# Patient Record
Sex: MALE | Race: WHITE | NOT HISPANIC OR LATINO | Employment: OTHER | URBAN - NONMETROPOLITAN AREA
[De-identification: names, ages, dates, MRNs, and addresses within clinical notes are randomized per-mention and may not be internally consistent; named-entity substitution may affect disease eponyms.]

---

## 2017-08-15 ENCOUNTER — ANESTHESIA (OUTPATIENT)
Dept: PERIOP | Facility: HOSPITAL | Age: 47
End: 2017-08-15

## 2017-08-15 ENCOUNTER — ANESTHESIA EVENT (OUTPATIENT)
Dept: PERIOP | Facility: HOSPITAL | Age: 47
End: 2017-08-15

## 2017-08-15 ENCOUNTER — HOSPITAL ENCOUNTER (INPATIENT)
Facility: HOSPITAL | Age: 47
LOS: 1 days | Discharge: LEFT BEFORE DISCHARGE | End: 2017-08-17
Attending: SURGERY | Admitting: SURGERY

## 2017-08-15 DIAGNOSIS — L03.119 CELLULITIS AND ABSCESS OF LOWER EXTREMITY: Primary | ICD-10-CM

## 2017-08-15 DIAGNOSIS — L02.419 CELLULITIS AND ABSCESS OF LOWER EXTREMITY: Primary | ICD-10-CM

## 2017-08-15 LAB
ANION GAP SERPL CALCULATED.3IONS-SCNC: 4.1 MMOL/L (ref 3.6–11.2)
BUN BLD-MCNC: 13 MG/DL (ref 7–21)
BUN/CREAT SERPL: 21.3 (ref 7–25)
CALCIUM SPEC-SCNC: 8.8 MG/DL (ref 7.7–10)
CHLORIDE SERPL-SCNC: 108 MMOL/L (ref 99–112)
CO2 SERPL-SCNC: 25.9 MMOL/L (ref 24.3–31.9)
CREAT BLD-MCNC: 0.61 MG/DL (ref 0.43–1.29)
GFR SERPL CREATININE-BSD FRML MDRD: 142 ML/MIN/1.73
GLUCOSE BLD-MCNC: 110 MG/DL (ref 70–110)
INR PPP: 1.63 (ref 0.9–1.1)
OSMOLALITY SERPL CALC.SUM OF ELEC: 276.4 MOSM/KG (ref 273–305)
POTASSIUM BLD-SCNC: 4.4 MMOL/L (ref 3.5–5.3)
PROTHROMBIN TIME: 19.6 SECONDS (ref 11–15.4)
SODIUM BLD-SCNC: 138 MMOL/L (ref 135–153)

## 2017-08-15 PROCEDURE — 85610 PROTHROMBIN TIME: CPT | Performed by: SURGERY

## 2017-08-15 PROCEDURE — 88304 TISSUE EXAM BY PATHOLOGIST: CPT | Performed by: SURGERY

## 2017-08-15 PROCEDURE — 87186 SC STD MICRODIL/AGAR DIL: CPT | Performed by: SURGERY

## 2017-08-15 PROCEDURE — 25010000002 FENTANYL CITRATE (PF) 100 MCG/2ML SOLUTION: Performed by: NURSE ANESTHETIST, CERTIFIED REGISTERED

## 2017-08-15 PROCEDURE — 25010000002 VITAMIN K1 PER 1 MG: Performed by: ANESTHESIOLOGY

## 2017-08-15 PROCEDURE — 25010000002 ONDANSETRON PER 1 MG: Performed by: ANESTHESIOLOGY

## 2017-08-15 PROCEDURE — 87205 SMEAR GRAM STAIN: CPT | Performed by: SURGERY

## 2017-08-15 PROCEDURE — 87077 CULTURE AEROBIC IDENTIFY: CPT | Performed by: SURGERY

## 2017-08-15 PROCEDURE — 87147 CULTURE TYPE IMMUNOLOGIC: CPT | Performed by: SURGERY

## 2017-08-15 PROCEDURE — 99223 1ST HOSP IP/OBS HIGH 75: CPT | Performed by: SURGERY

## 2017-08-15 PROCEDURE — G0378 HOSPITAL OBSERVATION PER HR: HCPCS

## 2017-08-15 PROCEDURE — 0JBD0ZZ EXCISION OF RIGHT UPPER ARM SUBCUTANEOUS TISSUE AND FASCIA, OPEN APPROACH: ICD-10-PCS | Performed by: SURGERY

## 2017-08-15 PROCEDURE — 25010000002 PROPOFOL 10 MG/ML EMULSION: Performed by: ANESTHESIOLOGY

## 2017-08-15 PROCEDURE — 25010000002 VANCOMYCIN PER 500 MG

## 2017-08-15 PROCEDURE — 87070 CULTURE OTHR SPECIMN AEROBIC: CPT | Performed by: SURGERY

## 2017-08-15 PROCEDURE — 94799 UNLISTED PULMONARY SVC/PX: CPT

## 2017-08-15 PROCEDURE — 80048 BASIC METABOLIC PNL TOTAL CA: CPT

## 2017-08-15 PROCEDURE — 25010000002 FENTANYL CITRATE (PF) 100 MCG/2ML SOLUTION: Performed by: ANESTHESIOLOGY

## 2017-08-15 PROCEDURE — 25010000002 MORPHINE PER 10 MG: Performed by: SURGERY

## 2017-08-15 PROCEDURE — 11406 EXC TR-EXT B9+MARG >4.0 CM: CPT | Performed by: SURGERY

## 2017-08-15 PROCEDURE — 25010000002 PIPERACILLIN-TAZOBACTAM: Performed by: SURGERY

## 2017-08-15 RX ORDER — METOPROLOL SUCCINATE 100 MG/1
100 TABLET, EXTENDED RELEASE ORAL 2 TIMES DAILY
COMMUNITY
End: 2017-08-17

## 2017-08-15 RX ORDER — IPRATROPIUM BROMIDE AND ALBUTEROL SULFATE 2.5; .5 MG/3ML; MG/3ML
3 SOLUTION RESPIRATORY (INHALATION) ONCE AS NEEDED
Status: DISCONTINUED | OUTPATIENT
Start: 2017-08-15 | End: 2017-08-15 | Stop reason: HOSPADM

## 2017-08-15 RX ORDER — SODIUM CHLORIDE 0.9 % (FLUSH) 0.9 %
1-10 SYRINGE (ML) INJECTION AS NEEDED
Status: DISCONTINUED | OUTPATIENT
Start: 2017-08-15 | End: 2017-08-17 | Stop reason: HOSPADM

## 2017-08-15 RX ORDER — ARIPIPRAZOLE 10 MG/1
5 TABLET ORAL DAILY
Status: DISCONTINUED | OUTPATIENT
Start: 2017-08-15 | End: 2017-08-17 | Stop reason: HOSPADM

## 2017-08-15 RX ORDER — MELOXICAM 7.5 MG/1
7.5 TABLET ORAL DAILY
Status: CANCELLED | OUTPATIENT
Start: 2017-08-15

## 2017-08-15 RX ORDER — DULOXETIN HYDROCHLORIDE 60 MG/1
60 CAPSULE, DELAYED RELEASE ORAL DAILY
Status: CANCELLED | OUTPATIENT
Start: 2017-08-15

## 2017-08-15 RX ORDER — LISINOPRIL 10 MG/1
20 TABLET ORAL DAILY
Status: DISCONTINUED | OUTPATIENT
Start: 2017-08-15 | End: 2017-08-16

## 2017-08-15 RX ORDER — LEVETIRACETAM 500 MG/1
1500 TABLET ORAL EVERY 12 HOURS SCHEDULED
Status: DISCONTINUED | OUTPATIENT
Start: 2017-08-15 | End: 2017-08-17 | Stop reason: HOSPADM

## 2017-08-15 RX ORDER — ALLOPURINOL 300 MG/1
300 TABLET ORAL DAILY
Status: CANCELLED | OUTPATIENT
Start: 2017-08-15

## 2017-08-15 RX ORDER — SODIUM CHLORIDE, SODIUM LACTATE, POTASSIUM CHLORIDE, CALCIUM CHLORIDE 600; 310; 30; 20 MG/100ML; MG/100ML; MG/100ML; MG/100ML
100 INJECTION, SOLUTION INTRAVENOUS CONTINUOUS
Status: DISCONTINUED | OUTPATIENT
Start: 2017-08-15 | End: 2017-08-17 | Stop reason: HOSPADM

## 2017-08-15 RX ORDER — FUROSEMIDE 80 MG
80 TABLET ORAL DAILY
Status: CANCELLED | OUTPATIENT
Start: 2017-08-15

## 2017-08-15 RX ORDER — PANTOPRAZOLE SODIUM 40 MG/1
40 TABLET, DELAYED RELEASE ORAL EVERY MORNING
Status: DISCONTINUED | OUTPATIENT
Start: 2017-08-16 | End: 2017-08-17 | Stop reason: HOSPADM

## 2017-08-15 RX ORDER — POTASSIUM CHLORIDE 750 MG/1
20 TABLET, FILM COATED, EXTENDED RELEASE ORAL
Status: DISCONTINUED | OUTPATIENT
Start: 2017-08-16 | End: 2017-08-17 | Stop reason: HOSPADM

## 2017-08-15 RX ORDER — PANTOPRAZOLE SODIUM 40 MG/1
40 TABLET, DELAYED RELEASE ORAL EVERY MORNING
Status: CANCELLED | OUTPATIENT
Start: 2017-08-16

## 2017-08-15 RX ORDER — ALLOPURINOL 300 MG/1
300 TABLET ORAL DAILY
Status: DISCONTINUED | OUTPATIENT
Start: 2017-08-15 | End: 2017-08-17 | Stop reason: HOSPADM

## 2017-08-15 RX ORDER — IPRATROPIUM BROMIDE AND ALBUTEROL SULFATE 2.5; .5 MG/3ML; MG/3ML
3 SOLUTION RESPIRATORY (INHALATION)
Status: CANCELLED | OUTPATIENT
Start: 2017-08-15

## 2017-08-15 RX ORDER — MELOXICAM 15 MG/1
15 TABLET ORAL DAILY
COMMUNITY
End: 2021-06-21 | Stop reason: HOSPADM

## 2017-08-15 RX ORDER — FENTANYL CITRATE 50 UG/ML
50 INJECTION, SOLUTION INTRAMUSCULAR; INTRAVENOUS
Status: DISCONTINUED | OUTPATIENT
Start: 2017-08-15 | End: 2017-08-15 | Stop reason: HOSPADM

## 2017-08-15 RX ORDER — LISINOPRIL 20 MG/1
20 TABLET ORAL DAILY
Status: ON HOLD | COMMUNITY
End: 2017-08-17

## 2017-08-15 RX ORDER — MELOXICAM 7.5 MG/1
7.5 TABLET ORAL DAILY
Status: DISCONTINUED | OUTPATIENT
Start: 2017-08-15 | End: 2017-08-17 | Stop reason: HOSPADM

## 2017-08-15 RX ORDER — GABAPENTIN 400 MG/1
600 CAPSULE ORAL 3 TIMES DAILY
COMMUNITY

## 2017-08-15 RX ORDER — ALBUTEROL SULFATE 2.5 MG/3ML
2.5 SOLUTION RESPIRATORY (INHALATION) EVERY 6 HOURS PRN
Status: CANCELLED | OUTPATIENT
Start: 2017-08-15

## 2017-08-15 RX ORDER — COLCHICINE 0.6 MG/1
0.6 TABLET ORAL 3 TIMES DAILY
Status: CANCELLED | OUTPATIENT
Start: 2017-08-15

## 2017-08-15 RX ORDER — BUDESONIDE AND FORMOTEROL FUMARATE DIHYDRATE 160; 4.5 UG/1; UG/1
2 AEROSOL RESPIRATORY (INHALATION)
Status: ON HOLD | COMMUNITY
End: 2021-06-15

## 2017-08-15 RX ORDER — OXYCODONE HYDROCHLORIDE AND ACETAMINOPHEN 5; 325 MG/1; MG/1
1 TABLET ORAL ONCE AS NEEDED
Status: DISCONTINUED | OUTPATIENT
Start: 2017-08-15 | End: 2017-08-15 | Stop reason: HOSPADM

## 2017-08-15 RX ORDER — FUROSEMIDE 80 MG
80 TABLET ORAL DAILY
Status: DISCONTINUED | OUTPATIENT
Start: 2017-08-15 | End: 2017-08-17 | Stop reason: HOSPADM

## 2017-08-15 RX ORDER — ROSUVASTATIN CALCIUM 20 MG/1
20 TABLET, COATED ORAL DAILY
Status: CANCELLED | OUTPATIENT
Start: 2017-08-15

## 2017-08-15 RX ORDER — GABAPENTIN 400 MG/1
800 CAPSULE ORAL EVERY 8 HOURS SCHEDULED
Status: DISCONTINUED | OUTPATIENT
Start: 2017-08-15 | End: 2017-08-17 | Stop reason: HOSPADM

## 2017-08-15 RX ORDER — SODIUM CHLORIDE 0.9 % (FLUSH) 0.9 %
1-10 SYRINGE (ML) INJECTION AS NEEDED
Status: DISCONTINUED | OUTPATIENT
Start: 2017-08-15 | End: 2017-08-15 | Stop reason: HOSPADM

## 2017-08-15 RX ORDER — WARFARIN SODIUM 3 MG/1
3 TABLET ORAL
Status: CANCELLED | OUTPATIENT
Start: 2017-08-15

## 2017-08-15 RX ORDER — DULOXETIN HYDROCHLORIDE 60 MG/1
60 CAPSULE, DELAYED RELEASE ORAL DAILY
Status: DISCONTINUED | OUTPATIENT
Start: 2017-08-15 | End: 2017-08-17 | Stop reason: HOSPADM

## 2017-08-15 RX ORDER — ONDANSETRON 2 MG/ML
4 INJECTION INTRAMUSCULAR; INTRAVENOUS ONCE AS NEEDED
Status: DISCONTINUED | OUTPATIENT
Start: 2017-08-15 | End: 2017-08-15 | Stop reason: HOSPADM

## 2017-08-15 RX ORDER — MAGNESIUM HYDROXIDE 1200 MG/15ML
LIQUID ORAL AS NEEDED
Status: DISCONTINUED | OUTPATIENT
Start: 2017-08-15 | End: 2017-08-15 | Stop reason: HOSPADM

## 2017-08-15 RX ORDER — LABETALOL HYDROCHLORIDE 5 MG/ML
5 INJECTION, SOLUTION INTRAVENOUS ONCE
Status: COMPLETED | OUTPATIENT
Start: 2017-08-15 | End: 2017-08-15

## 2017-08-15 RX ORDER — ALBUTEROL SULFATE 2.5 MG/3ML
2.5 SOLUTION RESPIRATORY (INHALATION) EVERY 6 HOURS PRN
Status: ON HOLD | COMMUNITY
End: 2021-06-15

## 2017-08-15 RX ORDER — POTASSIUM CHLORIDE 750 MG/1
20 TABLET, FILM COATED, EXTENDED RELEASE ORAL
Status: CANCELLED | OUTPATIENT
Start: 2017-08-16

## 2017-08-15 RX ORDER — ROSUVASTATIN CALCIUM 10 MG/1
10 TABLET, COATED ORAL NIGHTLY
COMMUNITY

## 2017-08-15 RX ORDER — CLONIDINE HYDROCHLORIDE 0.2 MG/1
0.2 TABLET ORAL 2 TIMES DAILY
Status: DISCONTINUED | OUTPATIENT
Start: 2017-08-15 | End: 2017-08-17 | Stop reason: HOSPADM

## 2017-08-15 RX ORDER — MEPERIDINE HYDROCHLORIDE 25 MG/ML
12.5 INJECTION INTRAMUSCULAR; INTRAVENOUS; SUBCUTANEOUS
Status: DISCONTINUED | OUTPATIENT
Start: 2017-08-15 | End: 2017-08-15 | Stop reason: HOSPADM

## 2017-08-15 RX ORDER — PROPOFOL 10 MG/ML
VIAL (ML) INTRAVENOUS AS NEEDED
Status: DISCONTINUED | OUTPATIENT
Start: 2017-08-15 | End: 2017-08-15 | Stop reason: SURG

## 2017-08-15 RX ORDER — METOPROLOL TARTRATE 5 MG/5ML
INJECTION INTRAVENOUS AS NEEDED
Status: DISCONTINUED | OUTPATIENT
Start: 2017-08-15 | End: 2017-08-15 | Stop reason: SURG

## 2017-08-15 RX ORDER — METOPROLOL SUCCINATE 50 MG/1
100 TABLET, EXTENDED RELEASE ORAL 2 TIMES DAILY
Status: CANCELLED | OUTPATIENT
Start: 2017-08-15

## 2017-08-15 RX ORDER — ALBUTEROL SULFATE 90 UG/1
2 AEROSOL, METERED RESPIRATORY (INHALATION) EVERY 4 HOURS PRN
COMMUNITY

## 2017-08-15 RX ORDER — LEVETIRACETAM 500 MG/1
1500 TABLET ORAL 2 TIMES DAILY
Status: CANCELLED | OUTPATIENT
Start: 2017-08-15

## 2017-08-15 RX ORDER — WARFARIN SODIUM 3 MG/1
3 TABLET ORAL
COMMUNITY
End: 2017-08-17 | Stop reason: HOSPADM

## 2017-08-15 RX ORDER — DOCUSATE SODIUM 100 MG/1
100 CAPSULE, LIQUID FILLED ORAL 2 TIMES DAILY
Status: DISCONTINUED | OUTPATIENT
Start: 2017-08-15 | End: 2017-08-17 | Stop reason: HOSPADM

## 2017-08-15 RX ORDER — FENTANYL CITRATE 50 UG/ML
INJECTION, SOLUTION INTRAMUSCULAR; INTRAVENOUS AS NEEDED
Status: DISCONTINUED | OUTPATIENT
Start: 2017-08-15 | End: 2017-08-15 | Stop reason: SURG

## 2017-08-15 RX ORDER — CLINDAMYCIN PHOSPHATE 900 MG/50ML
900 INJECTION INTRAVENOUS ONCE
Status: COMPLETED | OUTPATIENT
Start: 2017-08-15 | End: 2017-08-15

## 2017-08-15 RX ORDER — ALBUTEROL SULFATE 2.5 MG/3ML
2.5 SOLUTION RESPIRATORY (INHALATION) EVERY 6 HOURS PRN
Status: DISCONTINUED | OUTPATIENT
Start: 2017-08-15 | End: 2017-08-17 | Stop reason: HOSPADM

## 2017-08-15 RX ORDER — CLONIDINE HYDROCHLORIDE 0.2 MG/1
0.2 TABLET ORAL 2 TIMES DAILY
COMMUNITY
End: 2021-06-21 | Stop reason: HOSPADM

## 2017-08-15 RX ORDER — LISINOPRIL 10 MG/1
20 TABLET ORAL DAILY
Status: CANCELLED | OUTPATIENT
Start: 2017-08-15

## 2017-08-15 RX ORDER — ARIPIPRAZOLE 10 MG/1
5 TABLET ORAL DAILY
Status: CANCELLED | OUTPATIENT
Start: 2017-08-15

## 2017-08-15 RX ORDER — COLCHICINE 0.6 MG/1
0.6 TABLET ORAL 3 TIMES DAILY PRN
Status: DISCONTINUED | OUTPATIENT
Start: 2017-08-15 | End: 2017-08-17 | Stop reason: HOSPADM

## 2017-08-15 RX ORDER — TRAZODONE HYDROCHLORIDE 100 MG/1
100 TABLET ORAL NIGHTLY
COMMUNITY
End: 2022-07-27

## 2017-08-15 RX ORDER — ROSUVASTATIN CALCIUM 20 MG/1
20 TABLET, COATED ORAL DAILY
Status: DISCONTINUED | OUTPATIENT
Start: 2017-08-15 | End: 2017-08-17 | Stop reason: HOSPADM

## 2017-08-15 RX ORDER — LEVETIRACETAM 750 MG/1
750 TABLET ORAL 2 TIMES DAILY
COMMUNITY

## 2017-08-15 RX ORDER — WARFARIN SODIUM 4 MG/1
4 TABLET ORAL
COMMUNITY
End: 2017-08-17 | Stop reason: HOSPADM

## 2017-08-15 RX ORDER — CLONIDINE HYDROCHLORIDE 0.2 MG/1
0.2 TABLET ORAL 2 TIMES DAILY
Status: CANCELLED | OUTPATIENT
Start: 2017-08-15

## 2017-08-15 RX ORDER — TRAZODONE HYDROCHLORIDE 50 MG/1
100 TABLET ORAL NIGHTLY
Status: CANCELLED | OUTPATIENT
Start: 2017-08-15

## 2017-08-15 RX ORDER — GABAPENTIN 400 MG/1
800 CAPSULE ORAL EVERY 8 HOURS SCHEDULED
Status: CANCELLED | OUTPATIENT
Start: 2017-08-15

## 2017-08-15 RX ORDER — SODIUM CHLORIDE, SODIUM LACTATE, POTASSIUM CHLORIDE, CALCIUM CHLORIDE 600; 310; 30; 20 MG/100ML; MG/100ML; MG/100ML; MG/100ML
125 INJECTION, SOLUTION INTRAVENOUS CONTINUOUS
Status: DISCONTINUED | OUTPATIENT
Start: 2017-08-15 | End: 2017-08-15 | Stop reason: HOSPADM

## 2017-08-15 RX ORDER — BUDESONIDE AND FORMOTEROL FUMARATE DIHYDRATE 160; 4.5 UG/1; UG/1
2 AEROSOL RESPIRATORY (INHALATION)
Status: DISCONTINUED | OUTPATIENT
Start: 2017-08-15 | End: 2017-08-17 | Stop reason: HOSPADM

## 2017-08-15 RX ORDER — OMEPRAZOLE 20 MG/1
20 CAPSULE, DELAYED RELEASE ORAL DAILY
COMMUNITY

## 2017-08-15 RX ORDER — DULOXETIN HYDROCHLORIDE 60 MG/1
60 CAPSULE, DELAYED RELEASE ORAL 2 TIMES DAILY
Status: ON HOLD | COMMUNITY
End: 2022-07-25

## 2017-08-15 RX ORDER — LANOLIN ALCOHOL/MO/W.PET/CERES
500 CREAM (GRAM) TOPICAL DAILY
COMMUNITY

## 2017-08-15 RX ORDER — FUROSEMIDE 40 MG/1
40 TABLET ORAL DAILY
COMMUNITY
End: 2021-06-21 | Stop reason: HOSPADM

## 2017-08-15 RX ORDER — ARIPIPRAZOLE 10 MG/1
10 TABLET ORAL DAILY
COMMUNITY

## 2017-08-15 RX ORDER — WARFARIN SODIUM 4 MG/1
4 TABLET ORAL
Status: CANCELLED | OUTPATIENT
Start: 2017-08-15

## 2017-08-15 RX ORDER — POTASSIUM CHLORIDE 750 MG/1
10 TABLET, FILM COATED, EXTENDED RELEASE ORAL DAILY
COMMUNITY
End: 2021-06-21 | Stop reason: HOSPADM

## 2017-08-15 RX ORDER — COLCHICINE 0.6 MG/1
0.6 TABLET ORAL 3 TIMES DAILY
COMMUNITY
End: 2017-08-17

## 2017-08-15 RX ORDER — TRAZODONE HYDROCHLORIDE 50 MG/1
100 TABLET ORAL NIGHTLY
Status: DISCONTINUED | OUTPATIENT
Start: 2017-08-15 | End: 2017-08-17 | Stop reason: HOSPADM

## 2017-08-15 RX ORDER — HEPARIN SODIUM 5000 [USP'U]/ML
5000 INJECTION, SOLUTION INTRAVENOUS; SUBCUTANEOUS EVERY 8 HOURS SCHEDULED
Status: DISCONTINUED | OUTPATIENT
Start: 2017-08-16 | End: 2017-08-16

## 2017-08-15 RX ORDER — POLYETHYLENE GLYCOL 3350 17 G/17G
17 POWDER, FOR SOLUTION ORAL DAILY
Status: DISCONTINUED | OUTPATIENT
Start: 2017-08-15 | End: 2017-08-17 | Stop reason: HOSPADM

## 2017-08-15 RX ORDER — OXYCODONE HYDROCHLORIDE 15 MG/1
15 TABLET ORAL 4 TIMES DAILY PRN
COMMUNITY
End: 2022-05-19 | Stop reason: ALTCHOICE

## 2017-08-15 RX ORDER — METOPROLOL SUCCINATE 50 MG/1
100 TABLET, EXTENDED RELEASE ORAL EVERY 12 HOURS SCHEDULED
Status: DISCONTINUED | OUTPATIENT
Start: 2017-08-15 | End: 2017-08-16

## 2017-08-15 RX ORDER — MIDAZOLAM HYDROCHLORIDE 1 MG/ML
INJECTION INTRAMUSCULAR; INTRAVENOUS AS NEEDED
Status: DISCONTINUED | OUTPATIENT
Start: 2017-08-15 | End: 2017-08-15

## 2017-08-15 RX ORDER — IPRATROPIUM BROMIDE AND ALBUTEROL SULFATE 2.5; .5 MG/3ML; MG/3ML
3 SOLUTION RESPIRATORY (INHALATION)
Status: DISCONTINUED | OUTPATIENT
Start: 2017-08-15 | End: 2017-08-17 | Stop reason: HOSPADM

## 2017-08-15 RX ORDER — ALLOPURINOL 300 MG/1
300 TABLET ORAL DAILY
COMMUNITY

## 2017-08-15 RX ORDER — BUDESONIDE AND FORMOTEROL FUMARATE DIHYDRATE 160; 4.5 UG/1; UG/1
2 AEROSOL RESPIRATORY (INHALATION)
Status: CANCELLED | OUTPATIENT
Start: 2017-08-15

## 2017-08-15 RX ADMIN — FENTANYL CITRATE 50 MCG: 50 INJECTION INTRAMUSCULAR; INTRAVENOUS at 18:12

## 2017-08-15 RX ADMIN — SODIUM CHLORIDE, POTASSIUM CHLORIDE, SODIUM LACTATE AND CALCIUM CHLORIDE 125 ML/HR: 600; 310; 30; 20 INJECTION, SOLUTION INTRAVENOUS at 15:26

## 2017-08-15 RX ADMIN — METOPROLOL TARTRATE 3 MG: 1 INJECTION, SOLUTION INTRAVENOUS at 15:55

## 2017-08-15 RX ADMIN — CLONIDINE HYDROCHLORIDE 0.2 MG: 0.2 TABLET ORAL at 21:11

## 2017-08-15 RX ADMIN — FENTANYL CITRATE 50 MCG: 50 INJECTION INTRAMUSCULAR; INTRAVENOUS at 17:44

## 2017-08-15 RX ADMIN — METOPROLOL SUCCINATE 100 MG: 50 TABLET, FILM COATED, EXTENDED RELEASE ORAL at 21:12

## 2017-08-15 RX ADMIN — PIPERACILLIN SODIUM,TAZOBACTAM SODIUM 3.38 G: 3; .375 INJECTION, POWDER, FOR SOLUTION INTRAVENOUS at 21:14

## 2017-08-15 RX ADMIN — DULOXETINE HYDROCHLORIDE 60 MG: 60 CAPSULE, DELAYED RELEASE ORAL at 21:12

## 2017-08-15 RX ADMIN — MORPHINE SULFATE 4 MG: 4 INJECTION, SOLUTION INTRAMUSCULAR; INTRAVENOUS at 19:52

## 2017-08-15 RX ADMIN — VANCOMYCIN HYDROCHLORIDE 2500 MG: 5 INJECTION, POWDER, LYOPHILIZED, FOR SOLUTION INTRAVENOUS at 22:00

## 2017-08-15 RX ADMIN — DOCUSATE SODIUM 100 MG: 100 CAPSULE, LIQUID FILLED ORAL at 21:12

## 2017-08-15 RX ADMIN — FUROSEMIDE 80 MG: 80 TABLET ORAL at 21:11

## 2017-08-15 RX ADMIN — LISINOPRIL 20 MG: 10 TABLET ORAL at 21:12

## 2017-08-15 RX ADMIN — FENTANYL CITRATE 50 MCG: 50 INJECTION INTRAMUSCULAR; INTRAVENOUS at 17:51

## 2017-08-15 RX ADMIN — METOPROLOL TARTRATE 2 MG: 1 INJECTION, SOLUTION INTRAVENOUS at 16:30

## 2017-08-15 RX ADMIN — LEVETIRACETAM 1500 MG: 500 TABLET, FILM COATED ORAL at 21:12

## 2017-08-15 RX ADMIN — TRAZODONE HYDROCHLORIDE 100 MG: 50 TABLET ORAL at 21:12

## 2017-08-15 RX ADMIN — MELOXICAM 7.5 MG: 7.5 TABLET ORAL at 21:12

## 2017-08-15 RX ADMIN — FENTANYL CITRATE 150 MCG: 50 INJECTION INTRAMUSCULAR; INTRAVENOUS at 17:01

## 2017-08-15 RX ADMIN — POLYETHYLENE GLYCOL (3350) 17 G: 17 POWDER, FOR SOLUTION ORAL at 21:17

## 2017-08-15 RX ADMIN — PROPOFOL 300 MG: 10 INJECTION, EMULSION INTRAVENOUS at 16:16

## 2017-08-15 RX ADMIN — CLINDAMYCIN PHOSPHATE 900 MG: 18 INJECTION, SOLUTION INTRAVENOUS at 16:10

## 2017-08-15 RX ADMIN — GABAPENTIN 800 MG: 400 CAPSULE ORAL at 21:13

## 2017-08-15 RX ADMIN — FENTANYL CITRATE 50 MCG: 50 INJECTION INTRAMUSCULAR; INTRAVENOUS at 18:37

## 2017-08-15 RX ADMIN — ARIPIPRAZOLE 5 MG: 10 TABLET ORAL at 21:13

## 2017-08-15 RX ADMIN — FENTANYL CITRATE 50 MCG: 50 INJECTION INTRAMUSCULAR; INTRAVENOUS at 16:25

## 2017-08-15 RX ADMIN — CLINDAMYCIN PHOSPHATE 900 MG: 18 INJECTION, SOLUTION INTRAVENOUS at 15:49

## 2017-08-15 RX ADMIN — LABETALOL HYDROCHLORIDE 5 MG: 5 INJECTION, SOLUTION INTRAVENOUS at 18:05

## 2017-08-15 RX ADMIN — OXYCODONE HYDROCHLORIDE 20 MG: 5 TABLET ORAL at 21:29

## 2017-08-15 RX ADMIN — FENTANYL CITRATE 50 MCG: 50 INJECTION INTRAMUSCULAR; INTRAVENOUS at 17:15

## 2017-08-15 RX ADMIN — ONDANSETRON 4 MG: 2 INJECTION, SOLUTION INTRAMUSCULAR; INTRAVENOUS at 17:45

## 2017-08-15 RX ADMIN — FENTANYL CITRATE 100 MCG: 50 INJECTION INTRAMUSCULAR; INTRAVENOUS at 17:25

## 2017-08-15 RX ADMIN — LABETALOL HYDROCHLORIDE 5 MG: 5 INJECTION, SOLUTION INTRAVENOUS at 18:27

## 2017-08-15 RX ADMIN — ALLOPURINOL 300 MG: 300 TABLET ORAL at 21:12

## 2017-08-15 RX ADMIN — ROSUVASTATIN CALCIUM 20 MG: 20 TABLET, FILM COATED ORAL at 21:13

## 2017-08-15 RX ADMIN — SODIUM CHLORIDE, POTASSIUM CHLORIDE, SODIUM LACTATE AND CALCIUM CHLORIDE 100 ML/HR: 600; 310; 30; 20 INJECTION, SOLUTION INTRAVENOUS at 21:29

## 2017-08-15 RX ADMIN — PHYTONADIONE 10 MG: 10 INJECTION, EMULSION INTRAMUSCULAR; INTRAVENOUS; SUBCUTANEOUS at 16:10

## 2017-08-15 NOTE — ANESTHESIA PROCEDURE NOTES
Airway  Urgency: elective    Date/Time: 8/15/2017 4:10 PM  End Time:8/15/2017 4:11 PM  Airway not difficult    General Information and Staff    Patient location during procedure: OR  Anesthesiologist: PORTIA CORDERO    Indications and Patient Condition  Indications for airway management: airway protection    Preoxygenated: yes  Mask difficulty assessment: 1 - vent by mask    Final Airway Details  Final airway type: supraglottic airway      Successful airway: classic  Size 5    Number of attempts at approach: 1

## 2017-08-15 NOTE — ANESTHESIA POSTPROCEDURE EVALUATION
Patient: Adriano Henry    Procedure Summary     Date Anesthesia Start Anesthesia Stop Room / Location    08/15/17 3231 1483  [facility] OR 01 / [facility]       Procedure Diagnosis Surgeon Provider    INCISION AND DRAINAGE ABSCESS (Right ) Cellulitis and abscess of lower extremity  (Cellulitis and abscess of lower extremity [L02.419, L03.119]) MD Loy Welch MD          Anesthesia Type: general  Last vitals  BP        Temp        Pulse       Resp        SpO2          Post Anesthesia Care and Evaluation    Patient location during evaluation: bedside  Patient participation: complete - patient participated  Level of consciousness: awake and alert  Pain score: 3  Pain management: adequate  Airway patency: patent  Anesthetic complications: No anesthetic complications  PONV Status: none  Cardiovascular status: acceptable  Respiratory status: acceptable  Hydration status: acceptable

## 2017-08-15 NOTE — PLAN OF CARE
Problem: Perioperative Period (Adult)  Goal: Signs and Symptoms of Listed Potential Problems Will be Absent or Manageable (Perioperative Period)  Outcome: Ongoing (interventions implemented as appropriate)    08/15/17 1523   Perioperative Period   Problems Assessed (Perioperative Period) all   Problems Present (Perioperative Period) pain

## 2017-08-15 NOTE — PERIOPERATIVE NURSING NOTE
Called dr. Ragland regarding pt inr results from labs, dr. Ragland ok with results.  Pt was 21.7 and INR 2

## 2017-08-15 NOTE — NURSING NOTE
Updated bedside report to NANCY Serra and night shift RN. Informed of order for cardiac monitoring for heart rate.

## 2017-08-15 NOTE — H&P
Chief complaint: infection right thigh    HPI: patient is a 46-year-old white male referred by the emergency room and [facility].  Patient presented in the early morning hours with an obvious large abscess and cellulitis involving his right thigh.  This is been going on for some time.  ER doctor tried to get him transferred to [facility] but was told that therefore could not take the patient.  I accepted the patient in the early morning hours but did not show up for about 6-8 hours later.  He is super obese.  He is on Coumadin and has a vena cava umbrella in place.  His INR yesterday was 2        Review of Systems:   All systems were reviewed and negative except for that seen above    History  Past Medical History:   Diagnosis Date   • Arthritis    • Atrial fibrillation    • Hypertension      Past Surgical History:   Procedure Laterality Date   • ABDOMINAL SURGERY     • CHOLECYSTECTOMY     • GASTRIC BYPASS     • ANAND FILTER INSERTION JUGULAR       History reviewed. No pertinent family history.  Social History   Substance Use Topics   • Smoking status: Current Every Day Smoker     Packs/day: 1.00     Years: 10.00     Types: Cigarettes   • Smokeless tobacco: None   • Alcohol use No     Prescriptions Prior to Admission   Medication Sig Dispense Refill Last Dose   • albuterol (PROVENTIL HFA;VENTOLIN HFA) 108 (90 Base) MCG/ACT inhaler Inhale 2 puffs Every 6 (Six) Hours As Needed for Wheezing.   8/14/2017 at Unknown time   • albuterol (PROVENTIL) (2.5 MG/3ML) 0.083% nebulizer solution Take 2.5 mg by nebulization Every 6 (Six) Hours As Needed for Wheezing.   8/14/2017 at Unknown time   • allopurinol (ZYLOPRIM) 300 MG tablet Take 300 mg by mouth Daily.   8/14/2017 at Unknown time   • ARIPiprazole (ABILIFY) 5 MG tablet Take 5 mg by mouth Daily.   8/14/2017 at Unknown time   • budesonide-formoterol (SYMBICORT) 160-4.5 MCG/ACT inhaler Inhale 2 puffs 2 (Two) Times a Day.   8/14/2017 at Unknown time   •  CloNIDine (CATAPRES) 0.2 MG tablet Take 0.2 mg by mouth 2 (Two) Times a Day.   8/14/2017 at Unknown time   • colchicine 0.6 MG tablet Take 0.6 mg by mouth 3 (Three) Times a Day.   8/14/2017 at Unknown time   • DULoxetine (CYMBALTA) 60 MG capsule Take 60 mg by mouth Daily.   8/14/2017 at Unknown time   • furosemide (LASIX) 40 MG tablet Take 80 mg by mouth Daily.   8/14/2017 at Unknown time   • gabapentin (NEURONTIN) 800 MG tablet Take 800 mg by mouth 3 (Three) Times a Day.   8/14/2017 at Unknown time   • levETIRAcetam (KEPPRA) 750 MG tablet Take 1,500 mg by mouth 2 (Two) Times a Day.   08/14/2017   • lisinopril (PRINIVIL,ZESTRIL) 20 MG tablet Take 20 mg by mouth Daily.   8/14/2017 at Unknown time   • meloxicam (MOBIC) 7.5 MG tablet Take 7.5 mg by mouth Daily.   8/14/2017 at Unknown time   • metoprolol succinate XL (TOPROL-XL) 100 MG 24 hr tablet Take 100 mg by mouth 2 (Two) Times a Day.   8/14/2017 at Unknown time   • niacin (NIACIN SR,NIASPAN ER) 500 MG CR capsule Take 500 mg by mouth Every Night.   08/13/2017   • omeprazole (priLOSEC) 20 MG capsule Take 20 mg by mouth 2 (Two) Times a Day.   8/14/2017 at Unknown time   • oxyCODONE (ROXICODONE) 20 MG tablet Take 20 mg by mouth Every 6 (Six) Hours As Needed for Moderate Pain .   8/14/2017 at Unknown time   • potassium chloride (K-DUR) 10 MEQ CR tablet Take 20 mEq by mouth Daily With Breakfast.   8/14/2017 at Unknown time   • rosuvastatin (CRESTOR) 20 MG tablet Take 20 mg by mouth Daily.   8/14/2017 at Unknown time   • traZODone (DESYREL) 100 MG tablet Take 100 mg by mouth Every Night.   08/13/2017   • Umeclidinium-Vilanterol (ANORO ELLIPTA) 62.5-25 MCG/INH aerosol powder  Inhale 1 puff Daily.   8/14/2017 at Unknown time   • warfarin (COUMADIN) 3 MG tablet Take 3 mg by mouth Daily. Total 7mg daily   08/13/2017   • warfarin (COUMADIN) 4 MG tablet Take 4 mg by mouth Daily.   08/06/2017     Allergies:  Review of patient's allergies indicates no known  allergies.    Objective     Vital Signs  Temp:  [97.8 °F (36.6 °C)-97.9 °F (36.6 °C)] 97.9 °F (36.6 °C)  Heart Rate:  [63-94] 94  Resp:  [18-20] 18  BP: (124-126)/(75-80) 126/75    Physical Exam:      General Appearance:    Alert, cooperative, in no acute distress   Head:    Normocephalic, without obvious abnormality, atraumatic   Eyes:            Lids and lashes normal, conjunctivae and sclerae normal, no   icterus, no pallor, corneas clear, PERRLA   Ears:    Ears appear intact with no abnormalities noted   Throat:   No oral lesions, no thrush, oral mucosa moist   Neck:   No adenopathy, supple, trachea midline, no thyromegaly, no   carotid bruit, no JVD   Back:     No kyphosis present, no scoliosis present, no skin lesions,      erythema or scars, no tenderness to percussion or                   palpation,   range of motion normal   Lungs:     Clear to auscultation,respirations regular, even and                  unlabored    Heart:    Regular rhythm and normal rate, normal S1 and S2, no            murmur, no gallop, no rub, no click   Chest Wall:    No abnormalities observed   Abdomen:    Soft    Rectal:     Deferred   Extremities:   Moves all extremities well, no edema, no cyanosis, no             redness   Pulses:   Pulses palpable and equal bilaterally   Skin:   Right inner thigh has an obvious abscess measuring approximately 3-4 cm and large surrounding area of cellulitis.     Lymph nodes:   No palpable adenopathy   Neurologic:   Cranial nerves 2 - 12 grossly intact, sensation intact, DTR       present and equal bilaterally       No results found for: GLUCOSE, BUN, CREATININE, EGFRIFNONA, EGFRIFAFRI, BCR, CO2, CALCIUM, PROTENTOTREF, ALBUMIN, LABIL2, AST, ALT  No results found for: WBC, RBC, HGB, HCT, MCV, MCH, MCHC, RDW, RDWSD, MPV, PLT, NEUTRORELPCT, LYMPHORELPCT, MONORELPCT, EOSRELPCT, BASORELPCT, AUTOIGPER, NEUTROABS, LYMPHSABS, MONOSABS, EOSABS, BASOSABS, AUTOIGNUM, NRBC    Imaging Results (last 72 hours)      ** No results found for the last 72 hours. **             Assessment  Abscess right thigh in the super obese patient    Plan  Antibiotics and incision and drainage in the operating room             Leo Ragland MD  08/15/17  3:13 PM      Dragon disclaimer:  Much of this encounter note is an electronic transcription/translation of spoken language to printed text. The electronic translation of spoken language may permit erroneous, or at times, nonsensical words or phrases to be inadvertently transcribed; Although I have reviewed the note for such errors, some may still exist.

## 2017-08-15 NOTE — NURSING NOTE
Patient arrived back on the floor from surgery. Hand off report was given by the PACU RN to myself and night shift RN Kaela as we were giving shift report. Patient alert and oriented sitting up in the bed on RA at this time in no distress.

## 2017-08-15 NOTE — ANESTHESIA PREPROCEDURE EVALUATION
Anesthesia Evaluation     no history of anesthetic complications:  NPO Solid Status: > 6 hours  NPO Liquid Status: > 6 hours     Airway   Mallampati: II  TM distance: >3 FB  Neck ROM: full  no difficulty expected  Dental - normal exam   (+) poor dentition    Pulmonary - normal exam   Cardiovascular     Rhythm: irregular    (+) hypertension, dysrhythmias Atrial Fib,       Neuro/Psych  GI/Hepatic/Renal/Endo    (+) obesity,      Musculoskeletal     Abdominal  - normal exam   Substance History      OB/GYN          Other                                      Anesthesia Plan    ASA 4     general   total IV anesthesia  intravenous induction   Anesthetic plan and risks discussed with patient.

## 2017-08-15 NOTE — OP NOTE
Excision inflammatory mass right thigh    Pre-op:  Right thigh abscess    Post-op:  same       Indications: right thigh abscess with induration and chronic edema       Procedure Details   After obtaining informed consent and receiving preoperative antibiotics and with venous compression boots in place, the patient was taken to the operating room and placed under anesthesia.  Location:  Right thigh  Instrument used:  Scalpel, bovie, scissors, harmonic  Tissue debrided:  Skin, subQ  Size of area debrided: 17  X 21 x 8.2 cm    Findings:  abscess    Estimated Blood Loss:  *No blood loss documented*           Drains: none  Grafts and Implants: None  Specimens:   ID Type Source Tests Collected by Time Destination   1 : C&S ABSCESS RIGHT THIGH Swab Thigh, Right CULTURE, ROUTINE Diana Sutherland MD 8/15/2017 1633    A : TISSUE FROM RIGHT THIGH ABSCESS Tissue Thigh, Right TISSUE EXAM Diana Sutherland MD 8/15/2017 1719               Complications:  none           Disposition: PACU - hemodynamically stable.           Condition: stable

## 2017-08-16 LAB
ANION GAP SERPL CALCULATED.3IONS-SCNC: 4.5 MMOL/L (ref 3.6–11.2)
BUN BLD-MCNC: 11 MG/DL (ref 7–21)
BUN/CREAT SERPL: 21.2 (ref 7–25)
CALCIUM SPEC-SCNC: 8.3 MG/DL (ref 7.7–10)
CHLORIDE SERPL-SCNC: 109 MMOL/L (ref 99–112)
CO2 SERPL-SCNC: 23.5 MMOL/L (ref 24.3–31.9)
CREAT BLD-MCNC: 0.52 MG/DL (ref 0.43–1.29)
DEPRECATED RDW RBC AUTO: 76.7 FL (ref 37–54)
ERYTHROCYTE [DISTWIDTH] IN BLOOD BY AUTOMATED COUNT: 24.1 % (ref 11.5–14.5)
GFR SERPL CREATININE-BSD FRML MDRD: >150 ML/MIN/1.73
GLUCOSE BLD-MCNC: 115 MG/DL (ref 70–110)
HCT VFR BLD AUTO: 39 % (ref 42–52)
HGB BLD-MCNC: 12.8 G/DL (ref 14–18)
MCH RBC QN AUTO: 30.1 PG (ref 27–33)
MCHC RBC AUTO-ENTMCNC: 32.8 G/DL (ref 33–37)
MCV RBC AUTO: 91.8 FL (ref 80–94)
OSMOLALITY SERPL CALC.SUM OF ELEC: 274.1 MOSM/KG (ref 273–305)
PLATELET # BLD AUTO: 323 10*3/MM3 (ref 130–400)
PMV BLD AUTO: 10.2 FL (ref 6–10)
POTASSIUM BLD-SCNC: 3.8 MMOL/L (ref 3.5–5.3)
RBC # BLD AUTO: 4.25 10*6/MM3 (ref 4.7–6.1)
SODIUM BLD-SCNC: 137 MMOL/L (ref 135–153)
WBC NRBC COR # BLD: 11.92 10*3/MM3 (ref 4.5–12.5)

## 2017-08-16 PROCEDURE — 99024 POSTOP FOLLOW-UP VISIT: CPT | Performed by: SURGERY

## 2017-08-16 PROCEDURE — 25010000002 DIGOXIN PER 500 MCG

## 2017-08-16 PROCEDURE — 80048 BASIC METABOLIC PNL TOTAL CA: CPT | Performed by: SURGERY

## 2017-08-16 PROCEDURE — 25010000002 VANCOMYCIN PER 500 MG

## 2017-08-16 PROCEDURE — 25010000002 HEPARIN (PORCINE) PER 1000 UNITS: Performed by: SURGERY

## 2017-08-16 PROCEDURE — 85027 COMPLETE CBC AUTOMATED: CPT | Performed by: SURGERY

## 2017-08-16 PROCEDURE — C1751 CATH, INF, PER/CENT/MIDLINE: HCPCS

## 2017-08-16 PROCEDURE — 94799 UNLISTED PULMONARY SVC/PX: CPT

## 2017-08-16 PROCEDURE — 25010000002 MORPHINE PER 10 MG: Performed by: SURGERY

## 2017-08-16 PROCEDURE — 25010000002 PIPERACILLIN-TAZOBACTAM: Performed by: SURGERY

## 2017-08-16 PROCEDURE — 93005 ELECTROCARDIOGRAM TRACING: CPT | Performed by: INTERNAL MEDICINE

## 2017-08-16 PROCEDURE — 25010000002 ONDANSETRON PER 1 MG: Performed by: SURGERY

## 2017-08-16 PROCEDURE — 94640 AIRWAY INHALATION TREATMENT: CPT

## 2017-08-16 RX ORDER — ONDANSETRON 2 MG/ML
4 INJECTION INTRAMUSCULAR; INTRAVENOUS EVERY 6 HOURS PRN
Status: DISCONTINUED | OUTPATIENT
Start: 2017-08-16 | End: 2017-08-17 | Stop reason: HOSPADM

## 2017-08-16 RX ORDER — DIGOXIN 0.25 MG/ML
INJECTION INTRAMUSCULAR; INTRAVENOUS
Status: COMPLETED
Start: 2017-08-16 | End: 2017-08-16

## 2017-08-16 RX ORDER — SODIUM CHLORIDE 9 MG/ML
INJECTION, SOLUTION INTRAVENOUS
Status: DISPENSED
Start: 2017-08-16 | End: 2017-08-17

## 2017-08-16 RX ORDER — SODIUM CHLORIDE 0.9 % (FLUSH) 0.9 %
10 SYRINGE (ML) INJECTION EVERY 12 HOURS SCHEDULED
Status: DISCONTINUED | OUTPATIENT
Start: 2017-08-16 | End: 2017-08-17 | Stop reason: HOSPADM

## 2017-08-16 RX ORDER — SODIUM CHLORIDE 0.9 % (FLUSH) 0.9 %
10 SYRINGE (ML) INJECTION AS NEEDED
Status: DISCONTINUED | OUTPATIENT
Start: 2017-08-16 | End: 2017-08-17 | Stop reason: HOSPADM

## 2017-08-16 RX ORDER — DIGOXIN 0.25 MG/ML
250 INJECTION INTRAMUSCULAR; INTRAVENOUS ONCE
Status: COMPLETED | OUTPATIENT
Start: 2017-08-16 | End: 2017-08-16

## 2017-08-16 RX ADMIN — DILTIAZEM HYDROCHLORIDE 5 MG: 100 INJECTION, POWDER, LYOPHILIZED, FOR SOLUTION INTRAVENOUS at 13:20

## 2017-08-16 RX ADMIN — LEVETIRACETAM 1500 MG: 500 TABLET, FILM COATED ORAL at 08:05

## 2017-08-16 RX ADMIN — IPRATROPIUM BROMIDE AND ALBUTEROL SULFATE 3 ML: .5; 3 SOLUTION RESPIRATORY (INHALATION) at 15:34

## 2017-08-16 RX ADMIN — OXYCODONE HYDROCHLORIDE 20 MG: 5 TABLET ORAL at 03:41

## 2017-08-16 RX ADMIN — ONDANSETRON 4 MG: 2 INJECTION, SOLUTION INTRAMUSCULAR; INTRAVENOUS at 17:03

## 2017-08-16 RX ADMIN — PIPERACILLIN SODIUM,TAZOBACTAM SODIUM 3.38 G: 3; .375 INJECTION, POWDER, FOR SOLUTION INTRAVENOUS at 03:00

## 2017-08-16 RX ADMIN — PANTOPRAZOLE SODIUM 40 MG: 40 TABLET, DELAYED RELEASE ORAL at 06:00

## 2017-08-16 RX ADMIN — ARIPIPRAZOLE 5 MG: 10 TABLET ORAL at 08:04

## 2017-08-16 RX ADMIN — VANCOMYCIN HYDROCHLORIDE 1750 MG: 5 INJECTION, POWDER, LYOPHILIZED, FOR SOLUTION INTRAVENOUS at 14:18

## 2017-08-16 RX ADMIN — ALLOPURINOL 300 MG: 300 TABLET ORAL at 08:04

## 2017-08-16 RX ADMIN — POLYETHYLENE GLYCOL (3350) 17 G: 17 POWDER, FOR SOLUTION ORAL at 08:05

## 2017-08-16 RX ADMIN — SODIUM CHLORIDE, POTASSIUM CHLORIDE, SODIUM LACTATE AND CALCIUM CHLORIDE 100 ML/HR: 600; 310; 30; 20 INJECTION, SOLUTION INTRAVENOUS at 15:16

## 2017-08-16 RX ADMIN — LEVETIRACETAM 1500 MG: 500 TABLET, FILM COATED ORAL at 21:21

## 2017-08-16 RX ADMIN — BUDESONIDE AND FORMOTEROL FUMARATE DIHYDRATE 2 PUFF: 160; 4.5 AEROSOL RESPIRATORY (INHALATION) at 07:13

## 2017-08-16 RX ADMIN — DOCUSATE SODIUM 100 MG: 100 CAPSULE, LIQUID FILLED ORAL at 08:05

## 2017-08-16 RX ADMIN — DIGOXIN 250 MCG: 0.25 INJECTION INTRAMUSCULAR; INTRAVENOUS at 13:18

## 2017-08-16 RX ADMIN — OXYCODONE HYDROCHLORIDE 20 MG: 5 TABLET ORAL at 17:07

## 2017-08-16 RX ADMIN — PIPERACILLIN SODIUM,TAZOBACTAM SODIUM 3.38 G: 3; .375 INJECTION, POWDER, FOR SOLUTION INTRAVENOUS at 08:03

## 2017-08-16 RX ADMIN — GABAPENTIN 800 MG: 400 CAPSULE ORAL at 21:21

## 2017-08-16 RX ADMIN — PIPERACILLIN SODIUM,TAZOBACTAM SODIUM 3.38 G: 3; .375 INJECTION, POWDER, FOR SOLUTION INTRAVENOUS at 14:19

## 2017-08-16 RX ADMIN — Medication 10 ML: at 21:23

## 2017-08-16 RX ADMIN — DULOXETINE HYDROCHLORIDE 60 MG: 60 CAPSULE, DELAYED RELEASE ORAL at 08:04

## 2017-08-16 RX ADMIN — MELOXICAM 7.5 MG: 7.5 TABLET ORAL at 08:05

## 2017-08-16 RX ADMIN — HEPARIN SODIUM 5000 UNITS: 5000 INJECTION, SOLUTION INTRAVENOUS; SUBCUTANEOUS at 14:19

## 2017-08-16 RX ADMIN — IPRATROPIUM BROMIDE AND ALBUTEROL SULFATE 3 ML: .5; 3 SOLUTION RESPIRATORY (INHALATION) at 07:13

## 2017-08-16 RX ADMIN — TRAZODONE HYDROCHLORIDE 100 MG: 50 TABLET ORAL at 21:22

## 2017-08-16 RX ADMIN — HEPARIN SODIUM 5000 UNITS: 5000 INJECTION, SOLUTION INTRAVENOUS; SUBCUTANEOUS at 05:48

## 2017-08-16 RX ADMIN — GABAPENTIN 800 MG: 400 CAPSULE ORAL at 14:19

## 2017-08-16 RX ADMIN — MORPHINE SULFATE 4 MG: 4 INJECTION, SOLUTION INTRAMUSCULAR; INTRAVENOUS at 21:21

## 2017-08-16 RX ADMIN — METOPROLOL TARTRATE 25 MG: 25 TABLET, FILM COATED ORAL at 23:38

## 2017-08-16 RX ADMIN — MORPHINE SULFATE 4 MG: 4 INJECTION, SOLUTION INTRAMUSCULAR; INTRAVENOUS at 08:03

## 2017-08-16 RX ADMIN — GABAPENTIN 800 MG: 400 CAPSULE ORAL at 05:48

## 2017-08-16 RX ADMIN — ROSUVASTATIN CALCIUM 20 MG: 20 TABLET, FILM COATED ORAL at 08:05

## 2017-08-16 RX ADMIN — POTASSIUM CHLORIDE 20 MEQ: 750 TABLET, FILM COATED, EXTENDED RELEASE ORAL at 08:04

## 2017-08-16 RX ADMIN — RIVAROXABAN 20 MG: 20 TABLET, FILM COATED ORAL at 21:22

## 2017-08-16 RX ADMIN — OXYCODONE HYDROCHLORIDE 20 MG: 5 TABLET ORAL at 09:48

## 2017-08-16 RX ADMIN — MORPHINE SULFATE 4 MG: 4 INJECTION, SOLUTION INTRAMUSCULAR; INTRAVENOUS at 01:58

## 2017-08-16 RX ADMIN — VANCOMYCIN HYDROCHLORIDE 1750 MG: 5 INJECTION, POWDER, LYOPHILIZED, FOR SOLUTION INTRAVENOUS at 21:19

## 2017-08-16 RX ADMIN — BUDESONIDE AND FORMOTEROL FUMARATE DIHYDRATE 2 PUFF: 160; 4.5 AEROSOL RESPIRATORY (INHALATION) at 19:41

## 2017-08-16 RX ADMIN — PIPERACILLIN SODIUM,TAZOBACTAM SODIUM 3.38 G: 3; .375 INJECTION, POWDER, FOR SOLUTION INTRAVENOUS at 23:47

## 2017-08-16 NOTE — PROGRESS NOTES
Postoperative day #1 excision of tissue right thigh.    Patient feels okay.  He has developed some rapid ventricular rate with A. fib.    Afebrile vital signs stable    Wounds look good.    We'll have hospitalist see patient.

## 2017-08-16 NOTE — PLAN OF CARE
Problem: Patient Care Overview (Adult)  Goal: Plan of Care Review  Outcome: Ongoing (interventions implemented as appropriate)    08/16/17 0650   Coping/Psychosocial Response Interventions   Plan Of Care Reviewed With patient   Patient Care Overview   Progress improving         Problem: Infection, Risk/Actual (Adult)  Goal: Identify Related Risk Factors and Signs and Symptoms  Outcome: Ongoing (interventions implemented as appropriate)    Problem: Pain, Acute (Adult)  Goal: Identify Related Risk Factors and Signs and Symptoms  Outcome: Ongoing (interventions implemented as appropriate)

## 2017-08-16 NOTE — NURSING NOTE
Received orders from Dr. Ragland regarding tachycardia 160's, consult hospitalist, the hospitalist gave orders for pt to be consulted with cardiology.

## 2017-08-16 NOTE — PLAN OF CARE
Problem: Patient Care Overview (Adult)  Goal: Plan of Care Review  Outcome: Ongoing (interventions implemented as appropriate)  Goal: Adult Individualization and Mutuality  Outcome: Ongoing (interventions implemented as appropriate)  Goal: Discharge Needs Assessment  Outcome: Ongoing (interventions implemented as appropriate)    Problem: Infection, Risk/Actual (Adult)  Goal: Identify Related Risk Factors and Signs and Symptoms  Outcome: Ongoing (interventions implemented as appropriate)  Goal: Infection Prevention/Resolution  Outcome: Ongoing (interventions implemented as appropriate)    Problem: Fall Risk (Adult)  Goal: Identify Related Risk Factors and Signs and Symptoms  Outcome: Ongoing (interventions implemented as appropriate)  Goal: Absence of Falls  Outcome: Ongoing (interventions implemented as appropriate)    Problem: Pain, Acute (Adult)  Goal: Identify Related Risk Factors and Signs and Symptoms  Outcome: Ongoing (interventions implemented as appropriate)  Goal: Acceptable Pain Control/Comfort Level  Outcome: Ongoing (interventions implemented as appropriate)    Problem: Perioperative Period (Adult)  Goal: Signs and Symptoms of Listed Potential Problems Will be Absent or Manageable (Perioperative Period)  Outcome: Ongoing (interventions implemented as appropriate)

## 2017-08-16 NOTE — CONSULTS
Referring Provider: -Dr. Ragland    Primary care provider Dr. palaciso    Reason for Consultation: -Atrial fibrillation    Chief complaint -admitted for drainage of abscess in the right thigh      History of present illness:      46-year-old male has been admitted for right thigh abscess and underwent incision and drainage yesterday.    Today, he developed atrial fibrillation with rapid ventricular rate.  Ventricular rate was around 1 60-1 70 bpm.  During the episode, patient has been asymptomatic.  He denied history of palpitation, chest discomfort are increased shortness of breath.  Patient was admitted in the medical floor and he was transferred to a telemetry bed.  Digoxin 0.25 mg IV times one was given and started IV Cardizem infusion.  When I evaluated him, his ventricular rate was well controlled and the heart rate was in 70s.    Patient has history of paroxysmal atrial fibrillation diagnosed about 5 years ago and anticoagulated on warfarin.  Warfarin has been stopped 2 days prior to incision and drainage surgery.  At present patient is not done any long-term anticoagulation.  He is on subcutaneous heparin for DVT prophylaxis    No history of chest pain or angina.  He has history of dyspnea on exertion functional class II.  No history of PND, orthopnea or leg edema.    He has history of hypertension and takes lisinopril, Toprol-XL and clonidine.  At present his blood pressure is low to borderline.  No history of diabetes mellitus, smoking or on call abuse      Review of Systems     Constitutional-fatigue  ENT-none  Cardiovascular-as above  Respiratory-mild shortness of breath  GI-stomach problem.  Had history of gastric bypass surgery in the past  Musculoskeletal-backache  Psychiatric-depression  Review of father organ system involvement-negative    History  Past Medical History:   Diagnosis Date   • Arthritis    • Atrial fibrillation    • Hypertension    , Past Surgical History:   Procedure Laterality Date   •  "ABDOMINAL SURGERY     • CHOLECYSTECTOMY     • GASTRIC BYPASS     • ANAND FILTER INSERTION JUGULAR     • INCISION AND DRAINAGE LEG Right 8/15/2017    Procedure: INCISION AND DRAINAGE ABSCESS;  Surgeon: Diana Sutherland MD;  Location: [facility] OR;  Service:    , History reviewed. No pertinent family history., Social History   Substance Use Topics   • Smoking status: Current Every Day Smoker     Packs/day: 1.00     Years: 10.00     Types: Cigarettes   • Smokeless tobacco: None   • Alcohol use No   ,     Medication Review:      allopurinol 300 mg Oral Daily   ARIPiprazole 5 mg Oral Daily   budesonide-formoterol 2 puff Inhalation BID - RT   CloNIDine 0.2 mg Oral BID   docusate sodium 100 mg Oral BID   DULoxetine 60 mg Oral Daily   furosemide 80 mg Oral Daily   gabapentin 800 mg Oral Q8H   ipratropium-albuterol 3 mL Nebulization Q8H - RT   levETIRAcetam 1,500 mg Oral Q12H   meloxicam 7.5 mg Oral Daily   metoprolol tartrate 25 mg Oral Q12H   pantoprazole 40 mg Oral QAM   piperacillin-tazobactam 3.375 g Intravenous Q6H   polyethylene glycol 17 g Oral Daily   potassium chloride 20 mEq Oral Daily With Breakfast   rivaroxaban 20 mg Oral Daily With Dinner   rosuvastatin 20 mg Oral Daily   sodium chloride 10 mL Intracatheter Q12H   sodium chloride      traZODone 100 mg Oral Nightly   vancomycin 1,750 mg Intravenous Q8H        Allergies:  Review of patient's allergies indicates no known allergies.    Objective     Vital Sign Min/Max for last 24 hours  Temp  Min: 97.3 °F (36.3 °C)  Max: 98.5 °F (36.9 °C)   BP  Min: 88/60  Max: 148/82   Pulse  Min: 57  Max: 148   Resp  Min: 14  Max: 21   SpO2  Min: 94 %  Max: 100 %   Flow (L/min)  Min: 2  Max: 2   Weight  Min: 409 lb 3.2 oz (186 kg)  Max: 409 lb 3.2 oz (186 kg)     Flowsheet Rows         First Filed Value    Admission Height  70\" (177.8 cm) Documented at 08/15/2017 1130    Admission Weight  (!)  413 lb 14.4 oz (188 kg) Documented at 08/15/2017 1130             Physical Exam:   "   General Appearance:    Alert, cooperative, in no acute distress.  Obese    Head:    Normocephalic, without obvious abnormality, atraumatic   Eyes:            Lids and lashes normal, conjunctivae and sclerae normal, no   icterus, no pallor, corneas clear, PERRLA   Ears:    Ears appear intact with no abnormalities noted   Throat:   No oral lesions, no thrush, oral mucosa moist   Neck:   No adenopathy, supple, trachea midline, no thyromegaly, no   carotid bruit, no JVD   Back:     No kyphosis present, no scoliosis present, no skin lesions,      erythema or scars, no tenderness to percussion or                   palpation,   range of motion normal   Lungs:     Clear to auscultation,respirations regular, even and                  unlabored    Heart:    Irregular rhythm.  Heart rate-70s.  No murmurs    Chest Wall:    No abnormalities observed   Abdomen:     Normal bowel sounds, no masses, no organomegaly, soft        non-tender, non-distended, no guarding, no rebound                tenderness   Rectal:     Deferred   Extremities:   Right thigh surgical wound has been dressed    Pulses:   Pulses palpable and equal bilaterally   Skin:   No bleeding, bruising or rash   Lymph nodes:   No palpable adenopathy   Neurologic:   Cranial nerves 2 - 12 grossly intact, sensation intact, DTR       present and equal bilaterally       Telemetry  Atrial fibrillation    ECG  ECG/EMG Results (last 24 hours)     Procedure Component Value Units Date/Time    ECG 12 Lead [[id number]] Collected:  08/16/17 1641     Updated:  08/16/17 1643    Narrative:       Test Reason : Atrial fibrillation  Blood Pressure : **/** mmHG  Vent. Rate : 090 BPM     Atrial Rate : 394 BPM     P-R Int : 000 ms          QRS Dur : 164 ms      QT Int : 442 ms       P-R-T Axes : 000 -76 010 degrees     QTc Int : 540 ms    Atrial fibrillation  Right bundle branch block  Left anterior fascicular block  ** Bifascicular block **  Abnormal ECG  No previous ECGs  available    Referred By:  AIRAM           Confirmed By:             Labs    Results from last 7 days  Lab Units 08/16/17  0040   WBC 10*3/mm3 11.92   HEMOGLOBIN g/dL 12.8*   HEMATOCRIT % 39.0*   PLATELETS 10*3/mm3 323       Results from last 7 days  Lab Units 08/16/17  0040 08/15/17  1930   SODIUM mmol/L 137 138   POTASSIUM mmol/L 3.8 4.4   CHLORIDE mmol/L 109 108   CO2 mmol/L 23.5* 25.9   BUN mg/dL 11 13   CREATININE mg/dL 0.52 0.61   CALCIUM mg/dL 8.3 8.8   GLUCOSE mg/dL 115* 110               Results from last 7 days  Lab Units 08/15/17  1930   INR  1.63*                     Imaging  Imaging Results (last 24 hours)     ** No results found for the last 24 hours. **            Assessment     1.  Paroxysmal atrial fibrillation.  Presented with rapid ventricular response.  At present ventricular rate is controlled on IV Cardizem infusion.  Blood pressure is low to borderline.  No related symptoms  2. CHADS2-vasc score= 1  3.  Anticoagulated on warfarin.  On hold due to surgical purpose  4.  Hypertension.  At present blood pressure is low to borderline  5.  Right thigh abscess status post incision and drainage    Plan    1.  Continue IV Cardizem infusion to control the ventricular rate  2.  Started metoprolol 25 mg by mouth twice a day  3.  Stop Toprol- mg by mouth daily and lisinopril 20 mg by mouth daily due to low to borderline blood pressure  4.  Started long-term anticoagulation with Xarelyo 20 mg by mouth daily.  Daily.  Warfarin is discontinued because of history of difficulty to achieve INR due to his body size.  5.  Obtain TSH level  6.  Echocardiogram was ordered to evaluate left atrial size and LV function    I discussed the patients findings and my recommendations with patient       Thanks Dr. Ragland for the consult    Erik Gastelum MD  08/16/17  5:22 PM     Cc: Janice Moore tenderness

## 2017-08-16 NOTE — NURSING NOTE
Orders received from Dr. Gastelum to transfer pt to [address] [place] [address] at this time.

## 2017-08-17 ENCOUNTER — APPOINTMENT (OUTPATIENT)
Dept: CARDIOLOGY | Facility: HOSPITAL | Age: 47
End: 2017-08-17
Attending: INTERNAL MEDICINE

## 2017-08-17 VITALS
DIASTOLIC BLOOD PRESSURE: 71 MMHG | SYSTOLIC BLOOD PRESSURE: 152 MMHG | HEART RATE: 80 BPM | TEMPERATURE: 97.2 F | HEIGHT: 70 IN | RESPIRATION RATE: 18 BRPM | OXYGEN SATURATION: 99 % | BODY MASS INDEX: 45.1 KG/M2 | WEIGHT: 315 LBS

## 2017-08-17 LAB
BH CV ECHO MEAS - AO MAX PG (FULL): 4 MMHG
BH CV ECHO MEAS - AO MAX PG: 8 MMHG
BH CV ECHO MEAS - AO MEAN PG (FULL): 2.5 MMHG
BH CV ECHO MEAS - AO MEAN PG: 4.7 MMHG
BH CV ECHO MEAS - AO ROOT AREA (BSA CORRECTED): 1.3
BH CV ECHO MEAS - AO ROOT AREA: 10.9 CM^2
BH CV ECHO MEAS - AO ROOT DIAM: 3.7 CM
BH CV ECHO MEAS - AO V2 MAX: 141.6 CM/SEC
BH CV ECHO MEAS - AO V2 MEAN: 102 CM/SEC
BH CV ECHO MEAS - AO V2 VTI: 26.2 CM
BH CV ECHO MEAS - BSA(HAYCOCK): 3.1 M^2
BH CV ECHO MEAS - BSA: 2.8 M^2
BH CV ECHO MEAS - BZI_BMI: 58.8 KILOGRAMS/M^2
BH CV ECHO MEAS - BZI_METRIC_HEIGHT: 177.8 CM
BH CV ECHO MEAS - BZI_METRIC_WEIGHT: 186 KG
BH CV ECHO MEAS - CONTRAST EF 4CH: 65.2 ML/M^2
BH CV ECHO MEAS - EDV(CUBED): 100.5 ML
BH CV ECHO MEAS - EDV(MOD-SP4): 89 ML
BH CV ECHO MEAS - EDV(TEICH): 99.8 ML
BH CV ECHO MEAS - EF(CUBED): 67 %
BH CV ECHO MEAS - EF(TEICH): 58.6 %
BH CV ECHO MEAS - ESV(CUBED): 33.1 ML
BH CV ECHO MEAS - ESV(MOD-SP4): 31 ML
BH CV ECHO MEAS - ESV(TEICH): 41.3 ML
BH CV ECHO MEAS - FS: 30.9 %
BH CV ECHO MEAS - IVS/LVPW: 1.1
BH CV ECHO MEAS - IVSD: 1.3 CM
BH CV ECHO MEAS - LA DIMENSION: 3.9 CM
BH CV ECHO MEAS - LA/AO: 1
BH CV ECHO MEAS - LV DIASTOLIC VOL/BSA (35-75): 31.4 ML/M^2
BH CV ECHO MEAS - LV MASS(C)D: 216 GRAMS
BH CV ECHO MEAS - LV MASS(C)DI: 76.3 GRAMS/M^2
BH CV ECHO MEAS - LV MAX PG: 4 MMHG
BH CV ECHO MEAS - LV MEAN PG: 2.2 MMHG
BH CV ECHO MEAS - LV SYSTOLIC VOL/BSA (12-30): 10.9 ML/M^2
BH CV ECHO MEAS - LV V1 MAX: 100.4 CM/SEC
BH CV ECHO MEAS - LV V1 MEAN: 68.9 CM/SEC
BH CV ECHO MEAS - LV V1 VTI: 20.9 CM
BH CV ECHO MEAS - LVIDD: 4.6 CM
BH CV ECHO MEAS - LVIDS: 3.2 CM
BH CV ECHO MEAS - LVLD AP4: 8.6 CM
BH CV ECHO MEAS - LVLS AP4: 6.5 CM
BH CV ECHO MEAS - LVPWD: 1.2 CM
BH CV ECHO MEAS - MV DEC TIME: 0.19 SEC
BH CV ECHO MEAS - MV E MAX VEL: 89.9 CM/SEC
BH CV ECHO MEAS - MV MAX PG: 3.3 MMHG
BH CV ECHO MEAS - MV MEAN PG: 1.8 MMHG
BH CV ECHO MEAS - MV V2 MAX: 89.7 CM/SEC
BH CV ECHO MEAS - MV V2 MEAN: 62.6 CM/SEC
BH CV ECHO MEAS - MV V2 VTI: 11.4 CM
BH CV ECHO MEAS - PA ACC SLOPE: 457.5 CM/SEC^2
BH CV ECHO MEAS - PA ACC TIME: 0.17 SEC
BH CV ECHO MEAS - PA MAX PG: 8 MMHG
BH CV ECHO MEAS - PA MEAN PG: 4 MMHG
BH CV ECHO MEAS - PA PR(ACCEL): 2.9 MMHG
BH CV ECHO MEAS - PA V2 MAX: 141.2 CM/SEC
BH CV ECHO MEAS - PA V2 MEAN: 95.1 CM/SEC
BH CV ECHO MEAS - PA V2 VTI: 27.8 CM
BH CV ECHO MEAS - RAP SYSTOLE: 10 MMHG
BH CV ECHO MEAS - RVDD: 2.8 CM
BH CV ECHO MEAS - RVSP: 36.3 MMHG
BH CV ECHO MEAS - SI(AO): 101.2 ML/M^2
BH CV ECHO MEAS - SI(CUBED): 23.8 ML/M^2
BH CV ECHO MEAS - SI(MOD-SP4): 20.5 ML/M^2
BH CV ECHO MEAS - SI(TEICH): 20.7 ML/M^2
BH CV ECHO MEAS - SV(AO): 286.5 ML
BH CV ECHO MEAS - SV(CUBED): 67.4 ML
BH CV ECHO MEAS - SV(MOD-SP4): 58 ML
BH CV ECHO MEAS - SV(TEICH): 58.5 ML
BH CV ECHO MEAS - TR MAX VEL: 256.5 CM/SEC
TSH SERPL DL<=0.05 MIU/L-ACNC: 0.26 MIU/ML (ref 0.55–4.78)
VANCOMYCIN TROUGH SERPL-MCNC: 29.8 MCG/ML (ref 5–15)

## 2017-08-17 PROCEDURE — 94799 UNLISTED PULMONARY SVC/PX: CPT

## 2017-08-17 PROCEDURE — 84443 ASSAY THYROID STIM HORMONE: CPT | Performed by: INTERNAL MEDICINE

## 2017-08-17 PROCEDURE — 25010000002 PIPERACILLIN-TAZOBACTAM: Performed by: SURGERY

## 2017-08-17 PROCEDURE — 80202 ASSAY OF VANCOMYCIN: CPT

## 2017-08-17 PROCEDURE — 25010000002 VANCOMYCIN PER 500 MG

## 2017-08-17 PROCEDURE — 25010000002 MORPHINE PER 10 MG: Performed by: SURGERY

## 2017-08-17 PROCEDURE — 25010000002 ONDANSETRON PER 1 MG: Performed by: SURGERY

## 2017-08-17 PROCEDURE — 93306 TTE W/DOPPLER COMPLETE: CPT

## 2017-08-17 PROCEDURE — 99024 POSTOP FOLLOW-UP VISIT: CPT | Performed by: SURGERY

## 2017-08-17 RX ORDER — SULFAMETHOXAZOLE AND TRIMETHOPRIM 800; 160 MG/1; MG/1
1 TABLET ORAL 2 TIMES DAILY
Qty: 14 TABLET | Refills: 0 | Status: SHIPPED | OUTPATIENT
Start: 2017-08-17 | End: 2017-08-17

## 2017-08-17 RX ORDER — LISINOPRIL 10 MG/1
10 TABLET ORAL DAILY
Qty: 30 TABLET | Refills: 2 | Status: ON HOLD | OUTPATIENT
Start: 2017-08-17 | End: 2021-06-15

## 2017-08-17 RX ORDER — SULFAMETHOXAZOLE AND TRIMETHOPRIM 800; 160 MG/1; MG/1
1 TABLET ORAL 2 TIMES DAILY
Qty: 14 TABLET | Refills: 0 | Status: SHIPPED | OUTPATIENT
Start: 2017-08-17 | End: 2017-08-24

## 2017-08-17 RX ORDER — OXYCODONE AND ACETAMINOPHEN 10; 325 MG/1; MG/1
1 TABLET ORAL EVERY 6 HOURS PRN
Qty: 20 TABLET | Refills: 0 | Status: SHIPPED | OUTPATIENT
Start: 2017-08-17 | End: 2017-08-29 | Stop reason: SDUPTHER

## 2017-08-17 RX ORDER — COLCHICINE 0.6 MG/1
0.6 TABLET ORAL 3 TIMES DAILY PRN
Status: ON HOLD
Start: 2017-08-17 | End: 2021-06-15

## 2017-08-17 RX ADMIN — OXYCODONE HYDROCHLORIDE 20 MG: 5 TABLET ORAL at 08:37

## 2017-08-17 RX ADMIN — ONDANSETRON 4 MG: 2 INJECTION, SOLUTION INTRAMUSCULAR; INTRAVENOUS at 09:35

## 2017-08-17 RX ADMIN — ARIPIPRAZOLE 5 MG: 10 TABLET ORAL at 08:31

## 2017-08-17 RX ADMIN — LEVETIRACETAM 1500 MG: 500 TABLET, FILM COATED ORAL at 08:31

## 2017-08-17 RX ADMIN — ROSUVASTATIN CALCIUM 20 MG: 20 TABLET, FILM COATED ORAL at 08:31

## 2017-08-17 RX ADMIN — Medication 10 ML: at 08:43

## 2017-08-17 RX ADMIN — MELOXICAM 7.5 MG: 7.5 TABLET ORAL at 08:30

## 2017-08-17 RX ADMIN — DILTIAZEM HYDROCHLORIDE 2.5 MG/HR: 100 INJECTION, POWDER, LYOPHILIZED, FOR SOLUTION INTRAVENOUS at 01:41

## 2017-08-17 RX ADMIN — VANCOMYCIN HYDROCHLORIDE 1750 MG: 5 INJECTION, POWDER, LYOPHILIZED, FOR SOLUTION INTRAVENOUS at 08:43

## 2017-08-17 RX ADMIN — BUDESONIDE AND FORMOTEROL FUMARATE DIHYDRATE 2 PUFF: 160; 4.5 AEROSOL RESPIRATORY (INHALATION) at 18:49

## 2017-08-17 RX ADMIN — POTASSIUM CHLORIDE 20 MEQ: 750 TABLET, FILM COATED, EXTENDED RELEASE ORAL at 08:32

## 2017-08-17 RX ADMIN — GABAPENTIN 800 MG: 400 CAPSULE ORAL at 14:15

## 2017-08-17 RX ADMIN — ALLOPURINOL 300 MG: 300 TABLET ORAL at 08:31

## 2017-08-17 RX ADMIN — CLONIDINE HYDROCHLORIDE 0.2 MG: 0.2 TABLET ORAL at 18:00

## 2017-08-17 RX ADMIN — PIPERACILLIN SODIUM,TAZOBACTAM SODIUM 3.38 G: 3; .375 INJECTION, POWDER, FOR SOLUTION INTRAVENOUS at 05:40

## 2017-08-17 RX ADMIN — RIVAROXABAN 20 MG: 20 TABLET, FILM COATED ORAL at 18:00

## 2017-08-17 RX ADMIN — BUDESONIDE AND FORMOTEROL FUMARATE DIHYDRATE 2 PUFF: 160; 4.5 AEROSOL RESPIRATORY (INHALATION) at 06:44

## 2017-08-17 RX ADMIN — MORPHINE SULFATE 4 MG: 4 INJECTION, SOLUTION INTRAMUSCULAR; INTRAVENOUS at 05:45

## 2017-08-17 RX ADMIN — PANTOPRAZOLE SODIUM 40 MG: 40 TABLET, DELAYED RELEASE ORAL at 05:39

## 2017-08-17 RX ADMIN — MORPHINE SULFATE 4 MG: 4 INJECTION, SOLUTION INTRAMUSCULAR; INTRAVENOUS at 11:08

## 2017-08-17 RX ADMIN — PIPERACILLIN SODIUM,TAZOBACTAM SODIUM 3.38 G: 3; .375 INJECTION, POWDER, FOR SOLUTION INTRAVENOUS at 18:01

## 2017-08-17 RX ADMIN — OXYCODONE HYDROCHLORIDE 20 MG: 5 TABLET ORAL at 01:41

## 2017-08-17 RX ADMIN — DOCUSATE SODIUM 100 MG: 100 CAPSULE, LIQUID FILLED ORAL at 18:00

## 2017-08-17 RX ADMIN — PIPERACILLIN SODIUM,TAZOBACTAM SODIUM 3.38 G: 3; .375 INJECTION, POWDER, FOR SOLUTION INTRAVENOUS at 11:31

## 2017-08-17 RX ADMIN — GABAPENTIN 800 MG: 400 CAPSULE ORAL at 05:40

## 2017-08-17 RX ADMIN — DULOXETINE HYDROCHLORIDE 60 MG: 60 CAPSULE, DELAYED RELEASE ORAL at 08:31

## 2017-08-17 RX ADMIN — OXYCODONE HYDROCHLORIDE 20 MG: 5 TABLET ORAL at 14:15

## 2017-08-17 RX ADMIN — DOCUSATE SODIUM 100 MG: 100 CAPSULE, LIQUID FILLED ORAL at 08:31

## 2017-08-17 NOTE — PROGRESS NOTES
Discharge Planning Assessment  ALTAGRACIA Oneill     Patient Name: Adriano Henry  MRN: [id number]  Today's Date: 8/17/2017    Admit Date: 8/15/2017          Discharge Needs Assessment       08/17/17 1401    Living Environment    Lives With spouse    Living Arrangements house            Discharge Plan       08/17/17 1402    Case Management/Social Work Plan    Plan Pt admitted on 8/15/17.  SS received consult per Case Management for discharge planning.  SS spoke with pt on this date.  Pt lives at home with spouse and plans to return home at discharge.  Pt currently does not utilize home health services.  Pt may benefit from home health at discharge.  Pt currently utilizes standard cane, motorized wheelchair and home 02 via unknown provider.  Pt's PCP is Dr. Olvera at [facility].  SS will follow and assist with discharge needs.    Patient/Family In Agreement With Plan yes        Discharge Placement     No information found                Demographic Summary       08/17/17 1401    Referral Information    Admission Type inpatient    Arrived From admitted as an inpatient    Referral Source nursing    Reason For Consult discharge planning            Functional Status     None            Psychosocial     None            Abuse/Neglect     None            Legal     None            Substance Abuse     None            Patient Forms     None          Debi Aguila

## 2017-08-17 NOTE — PLAN OF CARE
Problem: Patient Care Overview (Adult)  Goal: Adult Individualization and Mutuality  Outcome: Ongoing (interventions implemented as appropriate)    Problem: Infection, Risk/Actual (Adult)  Goal: Identify Related Risk Factors and Signs and Symptoms  Outcome: Ongoing (interventions implemented as appropriate)  Goal: Infection Prevention/Resolution  Outcome: Ongoing (interventions implemented as appropriate)    Problem: Fall Risk (Adult)  Goal: Identify Related Risk Factors and Signs and Symptoms  Outcome: Ongoing (interventions implemented as appropriate)  Goal: Absence of Falls  Outcome: Ongoing (interventions implemented as appropriate)    Problem: Pain, Acute (Adult)  Goal: Identify Related Risk Factors and Signs and Symptoms  Outcome: Ongoing (interventions implemented as appropriate)  Goal: Acceptable Pain Control/Comfort Level  Outcome: Ongoing (interventions implemented as appropriate)    Problem: Perioperative Period (Adult)  Goal: Signs and Symptoms of Listed Potential Problems Will be Absent or Manageable (Perioperative Period)  Outcome: Ongoing (interventions implemented as appropriate)    Problem: Pressure Ulcer Risk (James Scale) (Adult,Obstetrics,Pediatric)  Goal: Identify Related Risk Factors and Signs and Symptoms  Outcome: Ongoing (interventions implemented as appropriate)  Goal: Skin Integrity  Outcome: Ongoing (interventions implemented as appropriate)

## 2017-08-17 NOTE — PROGRESS NOTES
Patient continues on day 3 vancomycin. Vancomycin trough level was reported as 29.8 mg/L today. Based on this level, will decrease vancomycin dose to 1750 mg q12 hrs. Will continue to follow and recheck a trough level when appropriate.    Thank you,    Krystin Manuel RP

## 2017-08-17 NOTE — PLAN OF CARE
Problem: Patient Care Overview (Adult)  Goal: Discharge Needs Assessment  Outcome: Ongoing (interventions implemented as appropriate)  Discharge Planning Assessment  ALTAGRACIA Oneill     Patient Name: Adriano Henry                 MRN: [id number]  Today's Date: 8/17/2017                     Admit Date: 8/15/2017             Discharge Needs Assessment        08/17/17 1401     Living Environment     Lives With spouse     Living Arrangements house       mkbmk       Discharge Plan        08/17/17 1402     Case Management/Social Work Plan     Plan Pt admitted on 8/15/17.  SS received consult per Case Management for discharge planning.  SS spoke with pt on this date.  Pt lives at home with spouse and plans to return home at discharge.  Pt currently does not utilize home health services.  Pt may benefit from home health at discharge.  Pt currently utilizes standard cane, motorized wheelchair and home 02 via unknown provider.  Pt's PCP is Dr. Olvera at [facility].  SS will follow and assist with discharge needs.     Patient/Family In Agreement With Plan yes       mk  Discharge Placement      No information found                     Demographic Summary        08/17/17 1401     Referral Information     Admission Type inpatient     Arrived From admitted as an inpatient     Referral Source nursing     Reason For Consult discharge planning       mkbmk       Functional Status      None       mkbmk       Psychosocial      None       mkbmk       Abuse/Neglect      None       mkbmk       Legal      None       mkbmk       Substance Abuse      None       mkbmk       Patient Forms      None       betsy Aguila

## 2017-08-17 NOTE — PROGRESS NOTES
Pharmacy was asked to look into the cost of Xarelto.      Xarelto is covered with a $0 co-pay for the patient.     Cheri Hua [facility]  08/17/17  12:42 PM

## 2017-08-17 NOTE — NURSING NOTE
Pt encouraged not to go off floor, educated on cardizem drip and importance of being monitored on telemetry.

## 2017-08-17 NOTE — DISCHARGE SUMMARY
Date of Discharge:  8/17/2017    Discharge Diagnosis: cellulitis and abscess soft tissue right leg    Presenting Problem/History of Present Illness  Cellulitis and abscess of lower extremity [L02.419, L03.119]  Cellulitis and abscess of lower extremity [L02.419, L03.119]  Cellulitis and abscess of lower extremity [L02.419, L03.119]       Hospital Course  Patient is a 46 y.o. male presented with obvious cellulitis and abscess right leg medial thigh.  He was started on antibiotics and taken to surgery.  He had a wide excision of this entire area which was closed over a Penrose drain.  Postoperatively he had some problems with atrial fibrillation and rapid ventricular rate.  He was seen by cardiology that this ago.  He was then discharged home on postoperative day #2     Procedures Performed  Procedure(s):  INCISION AND DRAINAGE ABSCESS       Consults: cardiologist  Consults     Date and Time Order Name Status Description    8/16/2017 1244 Inpatient Consult to Cardiology            Pertinent Test Results:   Imaging Results (all)     None          Lab Results (most recent)     Procedure Component Value Units Date/Time    Tissue Pathology Exam [[id number]] Collected:  08/15/17 1719    Specimen:  Tissue from Thigh, Right Updated:  08/15/17 1753    Protime-INR [[id number]]  (Abnormal) Collected:  08/15/17 1930    Specimen:  Blood Updated:  08/15/17 1950     Protime 19.6 (H) Seconds       Note new Reference Range        INR 1.63 (H)    Narrative:       Suggested INR therapeutic range for stable oral anticoagulant therapy:    Low Intensity therapy:   1.5-2.0  Moderate Intensity therapy:   2.0-3.0  High Intensity therapy:   2.5-4.0    Basic Metabolic Panel [[id number]]  (Normal) Collected:  08/15/17 1930    Specimen:  Blood Updated:  08/15/17 2003     Glucose 110 mg/dL      BUN 13 mg/dL      Creatinine 0.61 mg/dL      Sodium 138 mmol/L      Potassium 4.4 mmol/L      Chloride 108 mmol/L      CO2 25.9 mmol/L      Calcium 8.8  mg/dL      eGFR Non African Amer 142 mL/min/1.73      BUN/Creatinine Ratio 21.3     Anion Gap 4.1 mmol/L     Narrative:       GFR Normal >60  Chronic Kidney Disease <60  Kidney Failure <15    Osmolality, Calculated [[id number]]  (Normal) Collected:  08/15/17 1930    Specimen:  Blood Updated:  08/15/17 2003     Osmolality Calc 276.4 mOsm/kg     Basic Metabolic Panel [[id number]]  (Abnormal) Collected:  08/16/17 0040    Specimen:  Blood Updated:  08/16/17 0139     Glucose 115 (H) mg/dL       1+ Icteric        BUN 11 mg/dL      Creatinine 0.52 mg/dL      Sodium 137 mmol/L      Potassium 3.8 mmol/L      Chloride 109 mmol/L      CO2 23.5 (L) mmol/L      Calcium 8.3 mg/dL      eGFR Non African Amer >150 mL/min/1.73      BUN/Creatinine Ratio 21.2     Anion Gap 4.5 mmol/L     Narrative:       GFR Normal >60  Chronic Kidney Disease <60  Kidney Failure <15    Osmolality, Calculated [[id number]]  (Normal) Collected:  08/16/17 0040    Specimen:  Blood Updated:  08/16/17 0139     Osmolality Calc 274.1 mOsm/kg     CBC (No Diff) [[id number]]  (Abnormal) Collected:  08/16/17 0040    Specimen:  Blood Updated:  08/16/17 0148     WBC 11.92 10*3/mm3      RBC 4.25 (L) 10*6/mm3      Hemoglobin 12.8 (L) g/dL      Hematocrit 39.0 (L) %      MCV 91.8 fL      MCH 30.1 pg      MCHC 32.8 (L) g/dL      RDW 24.1 (H) %      RDW-SD 76.7 (H) fl      MPV 10.2 (H) fL      Platelets 323 10*3/mm3     Vancomycin, Trough [[id number]]  (Abnormal) Collected:  08/17/17 0350    Specimen:  Blood Updated:  08/17/17 0432     Vancomycin Trough 29.80 (H) mcg/mL     TSH [[id number]]  (Abnormal) Collected:  08/17/17 0350    Specimen:  Blood Updated:  08/17/17 0435     TSH 0.257 (L) mIU/mL     Culture, Routine [[id number]]  (Abnormal) Collected:  08/15/17 1633    Specimen:  Swab from Thigh, Right Updated:  08/17/17 1107     Culture Heavy growth (4+) Gram positive cocci, consistent with Staph spp (A)     Gram Stain Result Rare (1+) Gram positive cocci in pairs           Condition on Discharge:   Stable    Vital Signs  Temp:  [97.5 °F (36.4 °C)-98.9 °F (37.2 °C)] 98.8 °F (37.1 °C)  Heart Rate:  [] 62  Resp:  [20] 20  BP: ()/(50-74) 102/58    Physical Exam:     General Appearance:    Alert, cooperative, in no acute distress.  Supraumbilical    Head:    Normocephalic, without obvious abnormality, atraumatic   Eyes:            Lids and lashes normal, conjunctivae and sclerae normal, no   icterus, no pallor, corneas clear, PERRLA   Ears:    Ears appear intact with no abnormalities noted   Throat:   No oral lesions, no thrush, oral mucosa moist   Neck:   No adenopathy, supple, trachea midline, no thyromegaly, no   carotid bruit, no JVD   Back:     No kyphosis present, no scoliosis present, no skin lesions,      erythema or scars, no tenderness to percussion or                   palpation,   range of motion normal   Lungs:     Clear to auscultation,respirations regular, even and                  unlabored    Heart:    Regular rhythm and normal rate, normal S1 and S2, no            murmur, no gallop, no rub, no click   Chest Wall:    No abnormalities observed   Abdomen:     Normal bowel sounds, no masses, no organomegaly, soft        non-tender, non-distended, no guarding, no rebound                tenderness   Rectal:     Deferred   Extremities:   Moves all extremities well, no edema, no cyanosis, no             redness   Pulses:   Pulses palpable and equal bilaterally   Skin:  Right lower medial thigh there is approximately a 5 cm incision was drained with staples and sutures    Lymph nodes:   No palpable adenopathy   Neurologic:   Cranial nerves 2 - 12 grossly intact, sensation intact, DTR       present and equal bilaterally         Discharge DispositionHome or Self Care    Discharge Medications   Adriano Henry   Home Medication Instructions JOEY:[id number]    Printed on:08/17/17 6881   Medication Information                      albuterol (PROVENTIL HFA;VENTOLIN  HFA) 108 (90 Base) MCG/ACT inhaler  Inhale 2 puffs Every 6 (Six) Hours As Needed for Wheezing.             albuterol (PROVENTIL) (2.5 MG/3ML) 0.083% nebulizer solution  Take 2.5 mg by nebulization Every 6 (Six) Hours As Needed for Wheezing.             allopurinol (ZYLOPRIM) 300 MG tablet  Take 300 mg by mouth Daily.             ARIPiprazole (ABILIFY) 5 MG tablet  Take 5 mg by mouth Daily.             budesonide-formoterol (SYMBICORT) 160-4.5 MCG/ACT inhaler  Inhale 2 puffs 2 (Two) Times a Day.             CloNIDine (CATAPRES) 0.2 MG tablet  Take 0.2 mg by mouth 2 (Two) Times a Day.             colchicine 0.6 MG tablet  Take 0.6 mg by mouth 3 (Three) Times a Day.             DULoxetine (CYMBALTA) 60 MG capsule  Take 60 mg by mouth Daily.             furosemide (LASIX) 40 MG tablet  Take 80 mg by mouth Daily.             gabapentin (NEURONTIN) 800 MG tablet  Take 800 mg by mouth 3 (Three) Times a Day.             levETIRAcetam (KEPPRA) 750 MG tablet  Take 1,500 mg by mouth 2 (Two) Times a Day.             lisinopril (PRINIVIL,ZESTRIL) 20 MG tablet  Take 20 mg by mouth Daily.             meloxicam (MOBIC) 7.5 MG tablet  Take 7.5 mg by mouth Daily.             metoprolol succinate XL (TOPROL-XL) 100 MG 24 hr tablet  Take 100 mg by mouth 2 (Two) Times a Day.             niacin (NIACIN SR,NIASPAN ER) 500 MG CR capsule  Take 500 mg by mouth Every Night.             omeprazole (priLOSEC) 20 MG capsule  Take 20 mg by mouth 2 (Two) Times a Day.             oxyCODONE (ROXICODONE) 20 MG tablet  Take 20 mg by mouth Every 6 (Six) Hours As Needed for Moderate Pain .             oxyCODONE-acetaminophen (PERCOCET)  MG per tablet  Take 1 tablet by mouth Every 6 (Six) Hours As Needed for Moderate Pain .             potassium chloride (K-DUR) 10 MEQ CR tablet  Take 20 mEq by mouth Daily With Breakfast.             rosuvastatin (CRESTOR) 20 MG tablet  Take 20 mg by mouth Daily.             sulfamethoxazole-trimethoprim (BACTRIM  DS) 800-160 MG per tablet  Take 1 tablet by mouth 2 (Two) Times a Day for 7 days.             traZODone (DESYREL) 100 MG tablet  Take 100 mg by mouth Every Night.             Umeclidinium-Vilanterol (ANORO ELLIPTA) 62.5-25 MCG/INH aerosol powder   Inhale 1 puff Daily.                 Discharge Diet:   2000-calorie daily diet ADA    Activity at Discharge:   Activity Instructions     Discharge activity       Normal activity       Discharge activity       Normal activity               Limited  Okay to shower    Follow-up Appointments  One to 2 weeks in clinic.           Leo Ragland MD  08/17/17  3:41 PM            Dragon disclaimer:  Much of this encounter note is an electronic transcription/translation of spoken language to printed text. The electronic translation of spoken language may permit erroneous, or at times, nonsensical words or phrases to be inadvertently transcribed; Although I have reviewed the note for such errors, some may still exist.

## 2017-08-18 LAB
BACTERIA SPEC AEROBE CULT: ABNORMAL
GRAM STN SPEC: ABNORMAL

## 2017-08-18 NOTE — NURSING NOTE
Educated the patient about the importance of the medical treatment he was receiving for both his wound care and cardiac issues. Encouraged him to stay and follow cardiology plan of care. Pt stated his wife needed him and home and he had to leave.  Pt left AMA even after talking with Dr. Gastelum.

## 2017-08-21 LAB
LAB AP CASE REPORT: NORMAL
Lab: NORMAL
PATH REPORT.FINAL DX SPEC: NORMAL

## 2017-08-29 ENCOUNTER — OFFICE VISIT (OUTPATIENT)
Dept: SURGERY | Facility: CLINIC | Age: 47
End: 2017-08-29

## 2017-08-29 VITALS
BODY MASS INDEX: 45.1 KG/M2 | RESPIRATION RATE: 18 BRPM | DIASTOLIC BLOOD PRESSURE: 93 MMHG | WEIGHT: 315 LBS | HEIGHT: 70 IN | TEMPERATURE: 98.1 F | HEART RATE: 112 BPM | SYSTOLIC BLOOD PRESSURE: 153 MMHG

## 2017-08-29 DIAGNOSIS — L03.119 CELLULITIS AND ABSCESS OF LOWER EXTREMITY: Primary | ICD-10-CM

## 2017-08-29 DIAGNOSIS — T14.8XXA HEMATOMA: ICD-10-CM

## 2017-08-29 DIAGNOSIS — L02.419 CELLULITIS AND ABSCESS OF LOWER EXTREMITY: Primary | ICD-10-CM

## 2017-08-29 PROCEDURE — 99214 OFFICE O/P EST MOD 30 MIN: CPT | Performed by: SURGERY

## 2017-08-29 RX ORDER — OXYCODONE AND ACETAMINOPHEN 10; 325 MG/1; MG/1
1 TABLET ORAL EVERY 6 HOURS PRN
Qty: 20 TABLET | Refills: 0 | Status: ON HOLD | OUTPATIENT
Start: 2017-08-29 | End: 2017-08-30 | Stop reason: SDUPTHER

## 2017-08-29 NOTE — PROGRESS NOTES
8/29/2017    Patient Information  Adriano Henry  [address]  [place] [address]  1970  [phone] (home)     Chief Complaint   Patient presents with   • Post-op Follow-up     Post I&D R Thigh       HPI  Patient is a 46-year-old white male who is status post excision of large amount of fatty tissue on his right leg.  He is super morbidly obese.  He had an infectious process in this area.  This was performed a week ago.  The wound is now open with lots of drainage.    Heart of Systems:  The Review of Systems has been reviewed and confirmed.    General: negative  Integumentary: draining abscess  Eyes: negative  ENT: negative  Respiratory: negative  Gastrointestinal: negative  Cardiovascular: rapid heart rate and palpitations  Neurological: dizziness  Psychiatric: anxiety and depression  Hematologic/Lymphatic: negative  Genitourinary: negative  Musculoskeletal: painful joints, sore muscles, back pain and joint stiffness  Endocrine: negative  Breasts: negative      Patient Active Problem List   Diagnosis   • Cellulitis and abscess of lower extremity         Past Medical History:   Diagnosis Date   • Arthritis    • Atrial fibrillation    • Hypertension    • Seizures          Past Surgical History:   Procedure Laterality Date   • ABDOMINAL SURGERY     • CHOLECYSTECTOMY     • GASTRIC BYPASS     • ANAND FILTER INSERTION JUGULAR     • INCISION AND DRAINAGE LEG Right 8/15/2017    Procedure: INCISION AND DRAINAGE ABSCESS;  Surgeon: Diana Sutherland MD;  Location: [facility];  Service:    • KNEE SURGERY           Family History   Problem Relation Age of Onset   • Hypertension Mother    • Hypertension Father          Social History   Substance Use Topics   • Smoking status: Current Every Day Smoker     Packs/day: 1.00     Years: 10.00     Types: Cigarettes   • Smokeless tobacco: Not on file   • Alcohol use Yes       Current Outpatient Prescriptions   Medication Sig Dispense Refill   • albuterol  (PROVENTIL HFA;VENTOLIN HFA) 108 (90 Base) MCG/ACT inhaler Inhale 2 puffs Every 6 (Six) Hours As Needed for Wheezing.     • albuterol (PROVENTIL) (2.5 MG/3ML) 0.083% nebulizer solution Take 2.5 mg by nebulization Every 6 (Six) Hours As Needed for Wheezing.     • allopurinol (ZYLOPRIM) 300 MG tablet Take 300 mg by mouth Daily.     • ARIPiprazole (ABILIFY) 5 MG tablet Take 5 mg by mouth Daily.     • budesonide-formoterol (SYMBICORT) 160-4.5 MCG/ACT inhaler Inhale 2 puffs 2 (Two) Times a Day.     • CloNIDine (CATAPRES) 0.2 MG tablet Take 0.2 mg by mouth 2 (Two) Times a Day.     • colchicine 0.6 MG tablet Take 1 tablet by mouth 3 (Three) Times a Day As Needed for Muscle / Joint Pain.     • DULoxetine (CYMBALTA) 60 MG capsule Take 60 mg by mouth Daily.     • furosemide (LASIX) 40 MG tablet Take 80 mg by mouth Daily.     • gabapentin (NEURONTIN) 800 MG tablet Take 800 mg by mouth 3 (Three) Times a Day.     • levETIRAcetam (KEPPRA) 750 MG tablet Take 1,500 mg by mouth 2 (Two) Times a Day.     • lisinopril (PRINIVIL,ZESTRIL) 10 MG tablet Take 1 tablet by mouth Daily. 30 tablet 2   • meloxicam (MOBIC) 7.5 MG tablet Take 7.5 mg by mouth Daily.     • metoprolol tartrate (LOPRESSOR) 25 MG tablet Take 1 tablet by mouth Every 12 (Twelve) Hours. 60 tablet 2   • niacin (NIACIN SR,NIASPAN ER) 500 MG CR capsule Take 500 mg by mouth Every Night.     • omeprazole (priLOSEC) 20 MG capsule Take 20 mg by mouth 2 (Two) Times a Day.     • oxyCODONE (ROXICODONE) 20 MG tablet Take 20 mg by mouth Every 6 (Six) Hours As Needed for Moderate Pain .     • oxyCODONE-acetaminophen (PERCOCET)  MG per tablet Take 1 tablet by mouth Every 6 (Six) Hours As Needed for Moderate Pain . 20 tablet 0   • potassium chloride (K-DUR) 10 MEQ CR tablet Take 20 mEq by mouth Daily With Breakfast.     • rivaroxaban (XARELTO) 20 MG tablet Take 1 tablet by mouth Daily With Dinner. 30 tablet 2   • rosuvastatin (CRESTOR) 20 MG tablet Take 20 mg by mouth Daily.     •  traZODone (DESYREL) 100 MG tablet Take 100 mg by mouth Every Night.     • Umeclidinium-Vilanterol (ANORO ELLIPTA) 62.5-25 MCG/INH aerosol powder  Inhale 1 puff Daily.       No current facility-administered medications for this visit.          Allergies  Review of patient's allergies indicates no known allergies.      Physical Exam   Constitutional: He is oriented to person, place, and time. He appears well-developed and well-nourished. No distress.   Super morbidly obese   HENT:   Head: Normocephalic.   Right Ear: External ear normal.   Left Ear: External ear normal.   Nose: Nose normal.   Mouth/Throat: Oropharynx is clear and moist.   Eyes: Conjunctivae and EOM are normal. Right eye exhibits no discharge. Left eye exhibits no discharge.   Neck: Normal range of motion. No JVD present. No tracheal deviation present. No thyromegaly present.   Cardiovascular: Normal rate, regular rhythm, normal heart sounds and intact distal pulses.  Exam reveals no gallop and no friction rub.    No murmur heard.  Pulmonary/Chest: Effort normal and breath sounds normal. No stridor. No respiratory distress. He has no wheezes. He has no rales. He exhibits no tenderness.   Abdominal: Soft. Bowel sounds are normal. He exhibits no distension and no mass. There is no tenderness. There is no rebound and no guarding. No hernia.   Genitourinary: Rectal exam shows guaiac negative stool.   Musculoskeletal: Normal range of motion. He exhibits no edema, tenderness or deformity.   Lymphadenopathy:     He has no cervical adenopathy.   Neurological: He is alert and oriented to person, place, and time. He has normal reflexes. He displays normal reflexes. No cranial nerve deficit. He exhibits normal muscle tone. Coordination normal.   Skin: Skin is warm and dry. No rash noted. He is not diaphoretic. No erythema. No pallor.   Right leg, medial thigh, transverse incision measuring approximately 30 cm with drainage and Penrose drain in place.  Sutures and  "staples removed and there is a large hematoma in the wound   Psychiatric: He has a normal mood and affect. His behavior is normal. Judgment and thought content normal.         /93  Pulse 112  Temp 98.1 °F (36.7 °C)  Resp 18  Ht 70\" (177.8 cm)  Wt (!) 410 lb (186 kg)  BMI 58.83 kg/m2        ASSESSMENT  Nonhealing right thigh wound with large hematoma        PLAN    Exploration of wound OR and evacuation of hematoma        Leo Ragland MD    "

## 2017-08-30 ENCOUNTER — ANESTHESIA EVENT (OUTPATIENT)
Dept: PERIOP | Facility: HOSPITAL | Age: 47
End: 2017-08-30

## 2017-08-30 ENCOUNTER — ANESTHESIA (OUTPATIENT)
Dept: PERIOP | Facility: HOSPITAL | Age: 47
End: 2017-08-30

## 2017-08-30 ENCOUNTER — HOSPITAL ENCOUNTER (OUTPATIENT)
Facility: HOSPITAL | Age: 47
Setting detail: HOSPITAL OUTPATIENT SURGERY
Discharge: HOME | End: 2017-08-30
Attending: SURGERY | Admitting: SURGERY

## 2017-08-30 VITALS
DIASTOLIC BLOOD PRESSURE: 87 MMHG | WEIGHT: 315 LBS | SYSTOLIC BLOOD PRESSURE: 129 MMHG | RESPIRATION RATE: 18 BRPM | TEMPERATURE: 98.1 F | BODY MASS INDEX: 45.1 KG/M2 | HEART RATE: 100 BPM | OXYGEN SATURATION: 99 % | HEIGHT: 70 IN

## 2017-08-30 DIAGNOSIS — T14.8XXA HEMATOMA: ICD-10-CM

## 2017-08-30 DIAGNOSIS — L03.119 CELLULITIS AND ABSCESS OF LOWER EXTREMITY: ICD-10-CM

## 2017-08-30 DIAGNOSIS — L02.419 CELLULITIS AND ABSCESS OF LOWER EXTREMITY: ICD-10-CM

## 2017-08-30 PROCEDURE — 25010000002 ONDANSETRON PER 1 MG: Performed by: NURSE ANESTHETIST, CERTIFIED REGISTERED

## 2017-08-30 PROCEDURE — 87070 CULTURE OTHR SPECIMN AEROBIC: CPT | Performed by: SURGERY

## 2017-08-30 PROCEDURE — 87077 CULTURE AEROBIC IDENTIFY: CPT | Performed by: SURGERY

## 2017-08-30 PROCEDURE — 25010000002 MIDAZOLAM PER 1 MG: Performed by: NURSE ANESTHETIST, CERTIFIED REGISTERED

## 2017-08-30 PROCEDURE — 25010000002 PHENYLEPHRINE PER 1 ML: Performed by: NURSE ANESTHETIST, CERTIFIED REGISTERED

## 2017-08-30 PROCEDURE — 25010000002 PROPOFOL 10 MG/ML EMULSION: Performed by: NURSE ANESTHETIST, CERTIFIED REGISTERED

## 2017-08-30 PROCEDURE — 87205 SMEAR GRAM STAIN: CPT | Performed by: SURGERY

## 2017-08-30 PROCEDURE — 25010000002 FENTANYL CITRATE (PF) 100 MCG/2ML SOLUTION: Performed by: NURSE ANESTHETIST, CERTIFIED REGISTERED

## 2017-08-30 PROCEDURE — 87186 SC STD MICRODIL/AGAR DIL: CPT | Performed by: SURGERY

## 2017-08-30 PROCEDURE — 25010000002 VANCOMYCIN PER 500 MG

## 2017-08-30 PROCEDURE — 25010000002 DEXAMETHASONE PER 1 MG: Performed by: NURSE ANESTHETIST, CERTIFIED REGISTERED

## 2017-08-30 PROCEDURE — 94640 AIRWAY INHALATION TREATMENT: CPT

## 2017-08-30 PROCEDURE — 12020 TX SUPFC WND DEHSN SMPL CLSR: CPT | Performed by: SURGERY

## 2017-08-30 RX ORDER — IPRATROPIUM BROMIDE AND ALBUTEROL SULFATE 2.5; .5 MG/3ML; MG/3ML
3 SOLUTION RESPIRATORY (INHALATION) ONCE AS NEEDED
Status: DISCONTINUED | OUTPATIENT
Start: 2017-08-30 | End: 2017-08-30 | Stop reason: HOSPADM

## 2017-08-30 RX ORDER — OXYCODONE AND ACETAMINOPHEN 10; 325 MG/1; MG/1
1 TABLET ORAL EVERY 6 HOURS PRN
Qty: 20 TABLET | Refills: 0 | Status: SHIPPED | OUTPATIENT
Start: 2017-08-30 | End: 2017-09-14 | Stop reason: SDUPTHER

## 2017-08-30 RX ORDER — PROPOFOL 10 MG/ML
VIAL (ML) INTRAVENOUS AS NEEDED
Status: DISCONTINUED | OUTPATIENT
Start: 2017-08-30 | End: 2017-08-30 | Stop reason: SURG

## 2017-08-30 RX ORDER — IPRATROPIUM BROMIDE AND ALBUTEROL SULFATE 2.5; .5 MG/3ML; MG/3ML
3 SOLUTION RESPIRATORY (INHALATION) ONCE
Status: COMPLETED | OUTPATIENT
Start: 2017-08-30 | End: 2017-08-30

## 2017-08-30 RX ORDER — FENTANYL CITRATE 50 UG/ML
50 INJECTION, SOLUTION INTRAMUSCULAR; INTRAVENOUS
Status: DISCONTINUED | OUTPATIENT
Start: 2017-08-30 | End: 2017-08-30 | Stop reason: HOSPADM

## 2017-08-30 RX ORDER — ONDANSETRON 2 MG/ML
4 INJECTION INTRAMUSCULAR; INTRAVENOUS ONCE AS NEEDED
Status: DISCONTINUED | OUTPATIENT
Start: 2017-08-30 | End: 2017-08-30 | Stop reason: HOSPADM

## 2017-08-30 RX ORDER — ONDANSETRON 2 MG/ML
INJECTION INTRAMUSCULAR; INTRAVENOUS AS NEEDED
Status: DISCONTINUED | OUTPATIENT
Start: 2017-08-30 | End: 2017-08-30 | Stop reason: SURG

## 2017-08-30 RX ORDER — DEXAMETHASONE SODIUM PHOSPHATE 10 MG/ML
INJECTION INTRAMUSCULAR; INTRAVENOUS AS NEEDED
Status: DISCONTINUED | OUTPATIENT
Start: 2017-08-30 | End: 2017-08-30 | Stop reason: SURG

## 2017-08-30 RX ORDER — MAGNESIUM HYDROXIDE 1200 MG/15ML
LIQUID ORAL AS NEEDED
Status: DISCONTINUED | OUTPATIENT
Start: 2017-08-30 | End: 2017-08-30 | Stop reason: HOSPADM

## 2017-08-30 RX ORDER — MEPERIDINE HYDROCHLORIDE 25 MG/ML
12.5 INJECTION INTRAMUSCULAR; INTRAVENOUS; SUBCUTANEOUS
Status: DISCONTINUED | OUTPATIENT
Start: 2017-08-30 | End: 2017-08-30 | Stop reason: HOSPADM

## 2017-08-30 RX ORDER — SODIUM CHLORIDE 0.9 % (FLUSH) 0.9 %
1-10 SYRINGE (ML) INJECTION AS NEEDED
Status: DISCONTINUED | OUTPATIENT
Start: 2017-08-30 | End: 2017-08-30 | Stop reason: HOSPADM

## 2017-08-30 RX ORDER — SODIUM CHLORIDE, SODIUM LACTATE, POTASSIUM CHLORIDE, CALCIUM CHLORIDE 600; 310; 30; 20 MG/100ML; MG/100ML; MG/100ML; MG/100ML
125 INJECTION, SOLUTION INTRAVENOUS CONTINUOUS
Status: DISCONTINUED | OUTPATIENT
Start: 2017-08-30 | End: 2017-08-30 | Stop reason: HOSPADM

## 2017-08-30 RX ORDER — MIDAZOLAM HYDROCHLORIDE 1 MG/ML
INJECTION INTRAMUSCULAR; INTRAVENOUS AS NEEDED
Status: DISCONTINUED | OUTPATIENT
Start: 2017-08-30 | End: 2017-08-30 | Stop reason: SURG

## 2017-08-30 RX ORDER — FENTANYL CITRATE 50 UG/ML
INJECTION, SOLUTION INTRAMUSCULAR; INTRAVENOUS AS NEEDED
Status: DISCONTINUED | OUTPATIENT
Start: 2017-08-30 | End: 2017-08-30 | Stop reason: SURG

## 2017-08-30 RX ORDER — OXYCODONE HYDROCHLORIDE AND ACETAMINOPHEN 5; 325 MG/1; MG/1
1 TABLET ORAL ONCE AS NEEDED
Status: DISCONTINUED | OUTPATIENT
Start: 2017-08-30 | End: 2017-08-30 | Stop reason: HOSPADM

## 2017-08-30 RX ORDER — LIDOCAINE HYDROCHLORIDE 20 MG/ML
INJECTION, SOLUTION INFILTRATION; PERINEURAL AS NEEDED
Status: DISCONTINUED | OUTPATIENT
Start: 2017-08-30 | End: 2017-08-30 | Stop reason: SURG

## 2017-08-30 RX ADMIN — FENTANYL CITRATE 100 MCG: 50 INJECTION INTRAMUSCULAR; INTRAVENOUS at 11:11

## 2017-08-30 RX ADMIN — OXYCODONE HYDROCHLORIDE AND ACETAMINOPHEN 1 TABLET: 5; 325 TABLET ORAL at 13:40

## 2017-08-30 RX ADMIN — VANCOMYCIN HYDROCHLORIDE 2000 MG: 5 INJECTION, POWDER, LYOPHILIZED, FOR SOLUTION INTRAVENOUS at 11:11

## 2017-08-30 RX ADMIN — MIDAZOLAM HYDROCHLORIDE 2 MG: 1 INJECTION, SOLUTION INTRAMUSCULAR; INTRAVENOUS at 11:11

## 2017-08-30 RX ADMIN — PHENYLEPHRINE HYDROCHLORIDE 100 MCG: 10 INJECTION, SOLUTION INTRAMUSCULAR; INTRAVENOUS; SUBCUTANEOUS at 11:15

## 2017-08-30 RX ADMIN — ONDANSETRON 4 MG: 2 INJECTION, SOLUTION INTRAMUSCULAR; INTRAVENOUS at 11:25

## 2017-08-30 RX ADMIN — LIDOCAINE HYDROCHLORIDE 60 MG: 20 INJECTION, SOLUTION INFILTRATION; PERINEURAL at 11:25

## 2017-08-30 RX ADMIN — SODIUM CHLORIDE, POTASSIUM CHLORIDE, SODIUM LACTATE AND CALCIUM CHLORIDE 125 ML/HR: 600; 310; 30; 20 INJECTION, SOLUTION INTRAVENOUS at 10:55

## 2017-08-30 RX ADMIN — PROPOFOL 150 MG: 10 INJECTION, EMULSION INTRAVENOUS at 11:25

## 2017-08-30 RX ADMIN — IPRATROPIUM BROMIDE AND ALBUTEROL SULFATE 3 ML: .5; 3 SOLUTION RESPIRATORY (INHALATION) at 10:55

## 2017-08-30 RX ADMIN — DEXAMETHASONE SODIUM PHOSPHATE 4 MG: 10 INJECTION INTRAMUSCULAR; INTRAVENOUS at 11:25

## 2017-08-30 NOTE — ANESTHESIA PROCEDURE NOTES
Airway  Urgency: elective    Date/Time: 8/30/2017 11:25 AM  Airway not difficult    General Information and Staff    Patient location during procedure: OR  Anesthesiologist: RIVER DU  CRNA: MARKIE PUTNAM    Indications and Patient Condition  Indications for airway management: airway protection    Preoxygenated: yes  MILS maintained throughout  Mask difficulty assessment: 0 - not attempted    Final Airway Details  Final airway type: supraglottic airway      Successful airway: unique  Size 4    Number of attempts at approach: 1    Additional Comments  Dentition & lips unchanged from preop

## 2017-08-30 NOTE — ANESTHESIA PREPROCEDURE EVALUATION
Anesthesia Evaluation     Patient summary reviewed and Nursing notes reviewed   no history of anesthetic complications:  NPO Solid Status: > 8 hours  NPO Liquid Status: > 8 hours     Airway   Mallampati: III  TM distance: >3 FB  Neck ROM: full  no difficulty expected and possible difficult intubation  Dental - normal exam     Comment: Poor dentition    Pulmonary - normal exam   (+) COPD, rhonchi, decreased breath sounds,   Cardiovascular - normal exam  Exercise tolerance: good (4-7 METS)    NYHA Classification: II    (+) hypertension, dysrhythmias Atrial Fib,       Neuro/Psych  (+) seizures,    GI/Hepatic/Renal/Endo    (+) morbid obesity (super morbid obesity),     Musculoskeletal     Abdominal  - normal exam    Bowel sounds: normal.   Substance History - negative use     OB/GYN negative ob/gyn ROS         Other   (+) arthritis                                     Anesthesia Plan    ASA 4     general   (BMI 59)  intravenous induction   Anesthetic plan and risks discussed with patient.    Plan discussed with CRNA.

## 2017-08-30 NOTE — ANESTHESIA POSTPROCEDURE EVALUATION
Patient: Adriano Henry    Procedure Summary     Date Anesthesia Start Anesthesia Stop Room / Location    08/30/17 1111 1224  [facility] OR 02 / [facility] OR       Procedure Diagnosis Surgeon Provider    Exploration right leg wound (Right ) Hematoma; Cellulitis and abscess of lower extremity  (Hematoma [T14.8]; Cellulitis and abscess of lower extremity [L02.419, L03.119]) MD Panchito Dumas MD          Anesthesia Type: general  Last vitals  BP        Temp        Pulse       Resp        SpO2          Post Anesthesia Care and Evaluation    Patient location during evaluation: PHASE II  Patient participation: complete - patient participated  Level of consciousness: awake and alert  Pain score: 1  Pain management: adequate  Airway patency: patent  Anesthetic complications: No anesthetic complications  PONV Status: controlled  Cardiovascular status: acceptable  Respiratory status: acceptable  Hydration status: acceptable

## 2017-09-04 ENCOUNTER — HOSPITAL ENCOUNTER (EMERGENCY)
Facility: HOSPITAL | Age: 47
Discharge: HOME | End: 2017-09-04
Attending: FAMILY MEDICINE | Admitting: FAMILY MEDICINE

## 2017-09-04 VITALS
DIASTOLIC BLOOD PRESSURE: 80 MMHG | SYSTOLIC BLOOD PRESSURE: 140 MMHG | BODY MASS INDEX: 45.1 KG/M2 | WEIGHT: 315 LBS | TEMPERATURE: 98 F | OXYGEN SATURATION: 98 % | RESPIRATION RATE: 18 BRPM | HEIGHT: 70 IN | HEART RATE: 100 BPM

## 2017-09-04 DIAGNOSIS — L03.119 CELLULITIS AND ABSCESS OF LOWER EXTREMITY: Primary | ICD-10-CM

## 2017-09-04 DIAGNOSIS — L02.419 CELLULITIS AND ABSCESS OF LOWER EXTREMITY: Primary | ICD-10-CM

## 2017-09-04 PROCEDURE — 99283 EMERGENCY DEPT VISIT LOW MDM: CPT

## 2017-09-04 RX ORDER — ONDANSETRON 4 MG/1
4 TABLET, ORALLY DISINTEGRATING ORAL ONCE
Status: COMPLETED | OUTPATIENT
Start: 2017-09-04 | End: 2017-09-04

## 2017-09-04 RX ORDER — LEVOFLOXACIN 750 MG/1
TABLET ORAL
Status: COMPLETED
Start: 2017-09-04 | End: 2017-09-04

## 2017-09-04 RX ORDER — SULFAMETHOXAZOLE AND TRIMETHOPRIM 800; 160 MG/1; MG/1
1 TABLET ORAL 2 TIMES DAILY
Qty: 20 TABLET | Refills: 0 | Status: SHIPPED | OUTPATIENT
Start: 2017-09-04 | End: 2017-09-14

## 2017-09-04 RX ORDER — OXYCODONE AND ACETAMINOPHEN 10; 325 MG/1; MG/1
1 TABLET ORAL ONCE
Status: COMPLETED | OUTPATIENT
Start: 2017-09-04 | End: 2017-09-04

## 2017-09-04 RX ORDER — LEVOFLOXACIN 750 MG/1
750 TABLET ORAL
Status: DISCONTINUED | OUTPATIENT
Start: 2017-09-04 | End: 2017-09-04 | Stop reason: HOSPADM

## 2017-09-04 RX ADMIN — LEVOFLOXACIN 750 MG: 750 TABLET ORAL at 01:42

## 2017-09-04 RX ADMIN — LEVOFLOXACIN 750 MG: 750 TABLET, FILM COATED ORAL at 01:42

## 2017-09-04 RX ADMIN — OXYCODONE HYDROCHLORIDE AND ACETAMINOPHEN 1 TABLET: 10; 325 TABLET ORAL at 01:19

## 2017-09-04 RX ADMIN — ONDANSETRON 4 MG: 4 TABLET, ORALLY DISINTEGRATING ORAL at 01:19

## 2017-09-04 NOTE — ED PROVIDER NOTES
Subjective   History of Present Illness  45 y/o M here after I&D of abscess on R medial thigh with penrose drain placement 4 days PTP. Pt states that the drain left in place after the last procedure has come out and there is some scant bleeding around the site as well. No fevers/chills. Pt states the penrose has been draining approx 50 cc of dark fluid daily according to pt's wife. Pt states he was scheduled to have the drain removed on 9/7, pt is not on abx.   Review of Systems   Constitutional: Negative for chills, fatigue and fever.   Respiratory: Negative for cough, shortness of breath and wheezing.    Cardiovascular: Negative for chest pain and palpitations.   Gastrointestinal: Negative for abdominal distention and abdominal pain.   Genitourinary: Negative for difficulty urinating.   Skin: Positive for wound.        S/p I&D of abscess on R thigh   Hematological: Bruises/bleeds easily.   All other systems reviewed and are negative.      Past Medical History:   Diagnosis Date   • Arthritis    • Atrial fibrillation    • COPD (chronic obstructive pulmonary disease)    • Hypertension    • Oxygen dependent     AS NEEDED AT BEDTIME 2L   • Seizures        No Known Allergies    Past Surgical History:   Procedure Laterality Date   • ABDOMINAL SURGERY     • CHOLECYSTECTOMY     • GASTRIC BYPASS     • ANAND FILTER INSERTION JUGULAR     • INCISION AND DRAINAGE LEG Right 8/15/2017    Procedure: INCISION AND DRAINAGE ABSCESS;  Surgeon: Diana Sutherland MD;  Location: [facility] OR;  Service:    • KNEE SURGERY     • WOUND EXPLORATION LEG Right 8/30/2017    Procedure: Exploration right leg wound;  Surgeon: Leo Ragland MD;  Location: [facility] OR;  Service:        Family History   Problem Relation Age of Onset   • Hypertension Mother    • Hypertension Father        Social History     Social History   • Marital status:      Spouse name: N/A   • Number of children: N/A   • Years of education: N/A     Social History Main  Topics   • Smoking status: Current Every Day Smoker     Packs/day: 1.00     Years: 10.00     Types: Cigarettes   • Smokeless tobacco: None   • Alcohol use Yes   • Drug use: No   • Sexual activity: Defer     Other Topics Concern   • None     Social History Narrative           Objective   Physical Exam   Constitutional: He is oriented to person, place, and time. He appears well-developed and well-nourished. He is active.   HENT:   Head: Normocephalic and atraumatic.   Right Ear: Hearing and external ear normal.   Left Ear: Hearing and external ear normal.   Nose: Nose normal.   Mouth/Throat: Uvula is midline, oropharynx is clear and moist and mucous membranes are normal.   Eyes: Conjunctivae, EOM and lids are normal. Pupils are equal, round, and reactive to light.   Neck: Trachea normal, normal range of motion, full passive range of motion without pain and phonation normal. Neck supple.   Cardiovascular: Normal rate, regular rhythm and normal heart sounds.    Pulmonary/Chest: Effort normal and breath sounds normal.   Abdominal: Normal appearance.   Neurological: He is alert and oriented to person, place, and time. GCS eye subscore is 4. GCS verbal subscore is 5. GCS motor subscore is 6.   Skin: Skin is warm and dry.        Psychiatric: He has a normal mood and affect. His speech is normal and behavior is normal.   Nursing note and vitals reviewed.      Procedures         ED Course  ED Course      I spoke with Dr Ragland who agreed that the pt's drain could be left out for time being, recommended starting abx since pt's drain no longer in place. Pt to keep f/u appt this week as outpt. No findings on PE today to suggest reformation of abscess. Per pt's culture and susceptibility reports from the last wash out showed sensitivity to cipro and resistance to bactrim.            MDM  Number of Diagnoses or Management Options  Cellulitis and abscess of lower extremity: established and worsening     Amount and/or Complexity of  Data Reviewed  Discuss the patient with other providers: yes    Risk of Complications, Morbidity, and/or Mortality  Presenting problems: high  Diagnostic procedures: high  Management options: high    Patient Progress  Patient progress: stable      Final diagnoses:   Cellulitis and abscess of lower extremity            Ezra Wheeler MD  09/04/17 0125       Ezra Wheeler MD  09/04/17 1400

## 2017-09-05 ENCOUNTER — TELEPHONE (OUTPATIENT)
Dept: SURGERY | Facility: CLINIC | Age: 47
End: 2017-09-05

## 2017-09-05 ENCOUNTER — OFFICE VISIT (OUTPATIENT)
Dept: SURGERY | Facility: CLINIC | Age: 47
End: 2017-09-05

## 2017-09-05 VITALS
RESPIRATION RATE: 18 BRPM | HEART RATE: 97 BPM | SYSTOLIC BLOOD PRESSURE: 151 MMHG | WEIGHT: 315 LBS | BODY MASS INDEX: 45.1 KG/M2 | TEMPERATURE: 98.3 F | DIASTOLIC BLOOD PRESSURE: 97 MMHG | HEIGHT: 70 IN

## 2017-09-05 DIAGNOSIS — T14.8XXA HEMATOMA: Primary | ICD-10-CM

## 2017-09-05 DIAGNOSIS — L02.419 CELLULITIS AND ABSCESS OF LOWER EXTREMITY: ICD-10-CM

## 2017-09-05 DIAGNOSIS — L03.119 CELLULITIS AND ABSCESS OF LOWER EXTREMITY: ICD-10-CM

## 2017-09-05 PROCEDURE — 99024 POSTOP FOLLOW-UP VISIT: CPT | Performed by: SURGERY

## 2017-09-05 RX ORDER — CIPROFLOXACIN 500 MG/1
500 TABLET, FILM COATED ORAL 2 TIMES DAILY
Qty: 20 TABLET | Refills: 0 | Status: SHIPPED | OUTPATIENT
Start: 2017-09-05 | End: 2017-09-15

## 2017-09-05 NOTE — TELEPHONE ENCOUNTER
I called Home Health and asked that they bring supplies to the patient and that they do dry dressing for him once a week. They stated these are the orders they already have and they have ordered him supplies.

## 2017-09-05 NOTE — PROGRESS NOTES
9/5/2017    Patient Information  Adriano Henry  [address]  [facility] [address]  1970  [phone] (home)     Chief Complaint   Patient presents with   • Post-op Follow-up     Post Removal  Hematoma R Thigh       HPI  Patient is a 46-year-old white male here for follow-up on large wound right leg.  He had excision of large soft tissue mass by Dr. Sutherland approximately 3 weeks ago.  Last week I took him back surgery because there was a large hematoma in the wound which I cleaned out.  Cultures show him growing Citrobacter and Enterobacter cloaca.  These are sensitive to Cipro.  The emergency room 2 days ago because his drain came out.  He was started on Cipro at that time    Review of Systems:  The Review of Systems has been reviewed and confirmed.    General: negative  Integumentary: draining abscess  Eyes: negative  ENT: negative  Respiratory: negative  Gastrointestinal: negative  Cardiovascular: rapid heart rate and palpitations  Neurological: dizziness  Psychiatric: anxiety and depression  Hematologic/Lymphatic: negative  Genitourinary: negative  Musculoskeletal: painful joints, sore muscles, back pain and joint stiffness  Endocrine: negative  Breasts: negative      Patient Active Problem List   Diagnosis   • Cellulitis and abscess of lower extremity   • Hematoma         Past Medical History:   Diagnosis Date   • Arthritis    • Atrial fibrillation    • COPD (chronic obstructive pulmonary disease)    • Hypertension    • Oxygen dependent     AS NEEDED AT BEDTIME 2L   • Seizures          Past Surgical History:   Procedure Laterality Date   • ABDOMINAL SURGERY     • CHOLECYSTECTOMY     • GASTRIC BYPASS     • ANAND FILTER INSERTION JUGULAR     • INCISION AND DRAINAGE LEG Right 8/15/2017    Procedure: INCISION AND DRAINAGE ABSCESS;  Surgeon: Diana Sutherland MD;  Location: [facility];  Service:    • KNEE SURGERY     • WOUND EXPLORATION LEG Right 8/30/2017    Procedure: Exploration right leg  wound;  Surgeon: Leo Ragland MD;  Location: [facility] OR;  Service:          Family History   Problem Relation Age of Onset   • Hypertension Mother    • Hypertension Father          Social History   Substance Use Topics   • Smoking status: Current Every Day Smoker     Packs/day: 1.00     Years: 10.00     Types: Cigarettes   • Smokeless tobacco: Not on file   • Alcohol use Yes       Current Outpatient Prescriptions   Medication Sig Dispense Refill   • albuterol (PROVENTIL HFA;VENTOLIN HFA) 108 (90 Base) MCG/ACT inhaler Inhale 2 puffs Every 6 (Six) Hours As Needed for Wheezing.     • albuterol (PROVENTIL) (2.5 MG/3ML) 0.083% nebulizer solution Take 2.5 mg by nebulization Every 6 (Six) Hours As Needed for Wheezing.     • allopurinol (ZYLOPRIM) 300 MG tablet Take 300 mg by mouth Daily.     • ARIPiprazole (ABILIFY) 5 MG tablet Take 5 mg by mouth Daily.     • budesonide-formoterol (SYMBICORT) 160-4.5 MCG/ACT inhaler Inhale 2 puffs 2 (Two) Times a Day.     • CloNIDine (CATAPRES) 0.2 MG tablet Take 0.2 mg by mouth 2 (Two) Times a Day.     • colchicine 0.6 MG tablet Take 1 tablet by mouth 3 (Three) Times a Day As Needed for Muscle / Joint Pain.     • DULoxetine (CYMBALTA) 60 MG capsule Take 60 mg by mouth Daily.     • furosemide (LASIX) 40 MG tablet Take 80 mg by mouth Daily.     • gabapentin (NEURONTIN) 800 MG tablet Take 800 mg by mouth 3 (Three) Times a Day.     • levETIRAcetam (KEPPRA) 750 MG tablet Take 1,500 mg by mouth 2 (Two) Times a Day.     • lisinopril (PRINIVIL,ZESTRIL) 10 MG tablet Take 1 tablet by mouth Daily. 30 tablet 2   • meloxicam (MOBIC) 7.5 MG tablet Take 7.5 mg by mouth Daily.     • metoprolol tartrate (LOPRESSOR) 25 MG tablet Take 1 tablet by mouth Every 12 (Twelve) Hours. 60 tablet 2   • niacin (NIACIN SR,NIASPAN ER) 500 MG CR capsule Take 500 mg by mouth Every Night.     • omeprazole (priLOSEC) 20 MG capsule Take 20 mg by mouth 2 (Two) Times a Day.     • oxyCODONE (ROXICODONE) 20 MG tablet Take 20  "mg by mouth Every 6 (Six) Hours As Needed for Moderate Pain .     • oxyCODONE-acetaminophen (PERCOCET)  MG per tablet Take 1 tablet by mouth Every 6 (Six) Hours As Needed for Moderate Pain . 20 tablet 0   • potassium chloride (K-DUR) 10 MEQ CR tablet Take 20 mEq by mouth Daily With Breakfast.     • rivaroxaban (XARELTO) 20 MG tablet Take 1 tablet by mouth Daily With Dinner. 30 tablet 2   • rosuvastatin (CRESTOR) 20 MG tablet Take 20 mg by mouth Daily.     • sulfamethoxazole-trimethoprim (BACTRIM DS,SEPTRA DS) 800-160 MG per tablet Take 1 tablet by mouth 2 (Two) Times a Day for 10 days. 20 tablet 0   • traZODone (DESYREL) 100 MG tablet Take 100 mg by mouth Every Night.     • Umeclidinium-Vilanterol (ANORO ELLIPTA) 62.5-25 MCG/INH aerosol powder  Inhale 1 puff Daily.       No current facility-administered medications for this visit.          Allergies  Review of patient's allergies indicates no known allergies.      Physical Exam  Gen. a 46-year-old white male known distress  Right leg shows serous drainage from wound.  Mild redness around the sutures.    /97  Pulse 97  Temp 98.3 °F (36.8 °C)  Resp 18  Ht 70\" (177.8 cm)  Wt (!) 417 lb (189 kg)  BMI 59.83 kg/m2        ASSESSMENT  Poorly healing wound right leg with probable colonization with Citrobacter and Enterobacter        PLAN    The antibiotics, dry dressings when necessary.  Return 1 week        Leo Ragland MD    "

## 2017-09-06 LAB
BACTERIA SPEC AEROBE CULT: ABNORMAL
GRAM STN SPEC: ABNORMAL
GRAM STN SPEC: ABNORMAL

## 2017-09-14 ENCOUNTER — OFFICE VISIT (OUTPATIENT)
Dept: SURGERY | Facility: CLINIC | Age: 47
End: 2017-09-14

## 2017-09-14 VITALS
WEIGHT: 315 LBS | SYSTOLIC BLOOD PRESSURE: 150 MMHG | HEIGHT: 70 IN | BODY MASS INDEX: 45.1 KG/M2 | TEMPERATURE: 98.3 F | HEART RATE: 100 BPM | DIASTOLIC BLOOD PRESSURE: 90 MMHG

## 2017-09-14 DIAGNOSIS — L03.119 CELLULITIS AND ABSCESS OF LOWER EXTREMITY: Primary | ICD-10-CM

## 2017-09-14 DIAGNOSIS — L02.419 CELLULITIS AND ABSCESS OF LOWER EXTREMITY: Primary | ICD-10-CM

## 2017-09-14 PROCEDURE — 99214 OFFICE O/P EST MOD 30 MIN: CPT | Performed by: SURGERY

## 2017-09-14 RX ORDER — SODIUM HYPOCHLORITE 2.5 MG/ML
SOLUTION TOPICAL
Qty: 473 ML | Refills: 1 | Status: ON HOLD | OUTPATIENT
Start: 2017-09-14 | End: 2021-06-15

## 2017-09-14 RX ORDER — OXYCODONE AND ACETAMINOPHEN 10; 325 MG/1; MG/1
1 TABLET ORAL EVERY 6 HOURS PRN
Qty: 20 TABLET | Refills: 0 | Status: SHIPPED | OUTPATIENT
Start: 2017-09-14 | End: 2018-09-14

## 2017-09-14 NOTE — PROGRESS NOTES
9/14/2017    Patient Information  Adriano Henry  [address]  [facility] [address]  1970  [phone] (home)     Chief Complaint   Patient presents with   • Follow-up     Hematoma R thigh       HPI  Patient is a 46-year-old white male here for follow-up on large wound right leg.  Developed some drainage.    Review of Systems:  The Review of Systems has been reviewed and confirmed.    General: negative  Integumentary: draining abscess  Eyes: negative  ENT: negative  Respiratory: negative  Gastrointestinal: negative  Cardiovascular: rapid heart rate and palpitations  Neurological: dizziness  Psychiatric: anxiety and depression  Hematologic/Lymphatic: negative  Genitourinary: negative  Musculoskeletal: painful joints, sore muscles, back pain and joint stiffness  Endocrine: negative  Breasts: negative    Patient Active Problem List   Diagnosis   • Cellulitis and abscess of lower extremity   • Hematoma         Past Medical History:   Diagnosis Date   • Arthritis    • Atrial fibrillation    • COPD (chronic obstructive pulmonary disease)    • Hypertension    • Oxygen dependent     AS NEEDED AT BEDTIME 2L   • Seizures          Past Surgical History:   Procedure Laterality Date   • ABDOMINAL SURGERY     • CHOLECYSTECTOMY     • GASTRIC BYPASS     • ANAND FILTER INSERTION JUGULAR     • INCISION AND DRAINAGE LEG Right 8/15/2017    Procedure: INCISION AND DRAINAGE ABSCESS;  Surgeon: Diana Sutherland MD;  Location: [facility];  Service:    • KNEE SURGERY     • WOUND EXPLORATION LEG Right 8/30/2017    Procedure: Exploration right leg wound;  Surgeon: Leo Ragland MD;  Location: [facility];  Service:          Family History   Problem Relation Age of Onset   • Hypertension Mother    • Hypertension Father          Social History   Substance Use Topics   • Smoking status: Current Every Day Smoker     Packs/day: 1.00     Years: 10.00     Types: Cigarettes   • Smokeless tobacco: Not on file   • Alcohol use Yes        Current Outpatient Prescriptions   Medication Sig Dispense Refill   • albuterol (PROVENTIL HFA;VENTOLIN HFA) 108 (90 Base) MCG/ACT inhaler Inhale 2 puffs Every 6 (Six) Hours As Needed for Wheezing.     • albuterol (PROVENTIL) (2.5 MG/3ML) 0.083% nebulizer solution Take 2.5 mg by nebulization Every 6 (Six) Hours As Needed for Wheezing.     • allopurinol (ZYLOPRIM) 300 MG tablet Take 300 mg by mouth Daily.     • ARIPiprazole (ABILIFY) 5 MG tablet Take 5 mg by mouth Daily.     • budesonide-formoterol (SYMBICORT) 160-4.5 MCG/ACT inhaler Inhale 2 puffs 2 (Two) Times a Day.     • ciprofloxacin (CIPRO) 500 MG tablet Take 1 tablet by mouth 2 (Two) Times a Day for 10 days. 20 tablet 0   • CloNIDine (CATAPRES) 0.2 MG tablet Take 0.2 mg by mouth 2 (Two) Times a Day.     • colchicine 0.6 MG tablet Take 1 tablet by mouth 3 (Three) Times a Day As Needed for Muscle / Joint Pain.     • DULoxetine (CYMBALTA) 60 MG capsule Take 60 mg by mouth Daily.     • furosemide (LASIX) 40 MG tablet Take 80 mg by mouth Daily.     • gabapentin (NEURONTIN) 800 MG tablet Take 800 mg by mouth 3 (Three) Times a Day.     • levETIRAcetam (KEPPRA) 750 MG tablet Take 1,500 mg by mouth 2 (Two) Times a Day.     • lisinopril (PRINIVIL,ZESTRIL) 10 MG tablet Take 1 tablet by mouth Daily. 30 tablet 2   • meloxicam (MOBIC) 7.5 MG tablet Take 7.5 mg by mouth Daily.     • metoprolol tartrate (LOPRESSOR) 25 MG tablet Take 1 tablet by mouth Every 12 (Twelve) Hours. 60 tablet 2   • niacin (NIACIN SR,NIASPAN ER) 500 MG CR capsule Take 500 mg by mouth Every Night.     • omeprazole (priLOSEC) 20 MG capsule Take 20 mg by mouth 2 (Two) Times a Day.     • oxyCODONE (ROXICODONE) 20 MG tablet Take 20 mg by mouth Every 6 (Six) Hours As Needed for Moderate Pain .     • oxyCODONE-acetaminophen (PERCOCET)  MG per tablet Take 1 tablet by mouth Every 6 (Six) Hours As Needed for Moderate Pain . 20 tablet 0   • potassium chloride (K-DUR) 10 MEQ CR tablet Take 20 mEq by  "mouth Daily With Breakfast.     • rivaroxaban (XARELTO) 20 MG tablet Take 1 tablet by mouth Daily With Dinner. 30 tablet 2   • rosuvastatin (CRESTOR) 20 MG tablet Take 20 mg by mouth Daily.     • sulfamethoxazole-trimethoprim (BACTRIM DS,SEPTRA DS) 800-160 MG per tablet Take 1 tablet by mouth 2 (Two) Times a Day for 10 days. 20 tablet 0   • traZODone (DESYREL) 100 MG tablet Take 100 mg by mouth Every Night.     • Umeclidinium-Vilanterol (ANORO ELLIPTA) 62.5-25 MCG/INH aerosol powder  Inhale 1 puff Daily.       No current facility-administered medications for this visit.          Allergies  Review of patient's allergies indicates no known allergies.      Physical Exam  Gen. 46 white male in no distress  Right leg has some redness around the sutures with small amount drainage    /90  Pulse 100  Temp 98.3 °F (36.8 °C)  Ht 70\" (177.8 cm)  Wt (!) 417 lb (189 kg)  BMI 59.83 kg/m2        ASSESSMENT  Improving wound right thigh.  Sutures removed        PLAN    Wet-to-dry dressing.  Return 1 week patient wants more pain pills        Leo Ragland MD    "

## 2017-09-19 ENCOUNTER — TELEPHONE (OUTPATIENT)
Dept: SURGERY | Facility: CLINIC | Age: 47
End: 2017-09-19

## 2017-09-19 NOTE — TELEPHONE ENCOUNTER
Spoke with home health nurse about going out to see patient more during the week per Dr. Ragland's Request. She said if the patient was there she would go do more visits but that the patient is rarely home and she always has a hard time going when patient is home. I also asked if she could bring any chucks or packing strips. She said she could bring packing strips but that his insurance wouldn't cover for her to bring him any Chucks. I also let her know that we are packing his wound with dakins daily. She said she would write all of this down in his chart.

## 2018-02-18 ENCOUNTER — HOSPITAL ENCOUNTER (EMERGENCY)
Facility: HOSPITAL | Age: 48
Discharge: HOME | End: 2018-02-18
Attending: FAMILY MEDICINE | Admitting: FAMILY MEDICINE

## 2018-02-18 ENCOUNTER — APPOINTMENT (OUTPATIENT)
Dept: CT IMAGING | Facility: HOSPITAL | Age: 48
End: 2018-02-18

## 2018-02-18 ENCOUNTER — APPOINTMENT (OUTPATIENT)
Dept: GENERAL RADIOLOGY | Facility: HOSPITAL | Age: 48
End: 2018-02-18

## 2018-02-18 VITALS
RESPIRATION RATE: 20 BRPM | BODY MASS INDEX: 45.1 KG/M2 | WEIGHT: 315 LBS | HEIGHT: 70 IN | SYSTOLIC BLOOD PRESSURE: 137 MMHG | HEART RATE: 69 BPM | OXYGEN SATURATION: 98 % | DIASTOLIC BLOOD PRESSURE: 87 MMHG | TEMPERATURE: 98 F

## 2018-02-18 DIAGNOSIS — S82.001A CLOSED NONDISPLACED FRACTURE OF RIGHT PATELLA, UNSPECIFIED FRACTURE MORPHOLOGY, INITIAL ENCOUNTER: ICD-10-CM

## 2018-02-18 DIAGNOSIS — S29.019A THORACIC MYOFASCIAL STRAIN, INITIAL ENCOUNTER: Primary | ICD-10-CM

## 2018-02-18 PROCEDURE — 96372 THER/PROPH/DIAG INJ SC/IM: CPT

## 2018-02-18 PROCEDURE — 25010000002 ORPHENADRINE CITRATE PER 60 MG: Performed by: PHYSICIAN ASSISTANT

## 2018-02-18 PROCEDURE — 73564 X-RAY EXAM KNEE 4 OR MORE: CPT

## 2018-02-18 PROCEDURE — 72131 CT LUMBAR SPINE W/O DYE: CPT | Performed by: RADIOLOGY

## 2018-02-18 PROCEDURE — 73564 X-RAY EXAM KNEE 4 OR MORE: CPT | Performed by: RADIOLOGY

## 2018-02-18 PROCEDURE — 99283 EMERGENCY DEPT VISIT LOW MDM: CPT

## 2018-02-18 PROCEDURE — 72128 CT CHEST SPINE W/O DYE: CPT | Performed by: RADIOLOGY

## 2018-02-18 PROCEDURE — 72128 CT CHEST SPINE W/O DYE: CPT

## 2018-02-18 PROCEDURE — 25010000002 HYDROMORPHONE PER 4 MG: Performed by: FAMILY MEDICINE

## 2018-02-18 PROCEDURE — 72131 CT LUMBAR SPINE W/O DYE: CPT

## 2018-02-18 RX ORDER — HYDROMORPHONE HCL 110MG/55ML
2 PATIENT CONTROLLED ANALGESIA SYRINGE INTRAVENOUS ONCE
Status: COMPLETED | OUTPATIENT
Start: 2018-02-18 | End: 2018-02-18

## 2018-02-18 RX ORDER — HYDROCODONE BITARTRATE AND ACETAMINOPHEN 7.5; 325 MG/1; MG/1
1 TABLET ORAL EVERY 6 HOURS PRN
Qty: 6 TABLET | Refills: 0 | Status: ON HOLD | OUTPATIENT
Start: 2018-02-18 | End: 2021-06-15

## 2018-02-18 RX ORDER — OXYCODONE HYDROCHLORIDE AND ACETAMINOPHEN 5; 325 MG/1; MG/1
1 TABLET ORAL EVERY 8 HOURS PRN
Status: DISCONTINUED | OUTPATIENT
Start: 2018-02-18 | End: 2018-02-18 | Stop reason: HOSPADM

## 2018-02-18 RX ORDER — ONDANSETRON 4 MG/1
4 TABLET, ORALLY DISINTEGRATING ORAL ONCE
Status: COMPLETED | OUTPATIENT
Start: 2018-02-18 | End: 2018-02-18

## 2018-02-18 RX ORDER — ORPHENADRINE CITRATE 30 MG/ML
60 INJECTION INTRAMUSCULAR; INTRAVENOUS ONCE
Status: COMPLETED | OUTPATIENT
Start: 2018-02-18 | End: 2018-02-18

## 2018-02-18 RX ADMIN — ONDANSETRON 4 MG: 4 TABLET, ORALLY DISINTEGRATING ORAL at 18:26

## 2018-02-18 RX ADMIN — HYDROMORPHONE HYDROCHLORIDE 2 MG: 2 INJECTION INTRAMUSCULAR; INTRAVENOUS; SUBCUTANEOUS at 18:26

## 2018-02-18 RX ADMIN — ORPHENADRINE CITRATE 60 MG: 30 INJECTION INTRAMUSCULAR; INTRAVENOUS at 18:26

## 2018-02-18 NOTE — ED PROVIDER NOTES
Subjective   HPI Comments: 47-year-old male presents to the ED today after a fall.  This occurred approximately 90 minutes ago.  He states he was getting up from his recliner to go to the bathroom when he got dizzy and fell.  He states he felt like the room was spinning.  He states this is happened to him before.  He denies any loss of consciousness.  He denies any syncope.  He states the dizziness has resolved.  He denies any chest pain or shortness of breath.  He denies any abdominal pain.  He denies any nausea or vomiting.  He states he is having mid to lower back pain as well as right knee pain.  He states he has a history of chronic back pain and has multiple herniated disks.  He states he also needs bilateral knee replacement but cannot have the surgery due to his weight.  He states that he does not have a primary care provider at this time but is planning to see Dr. Herbert in the office tomorrow.    Patient is a 47 y.o. male presenting with fall.   History provided by:  Patient  Fall   Mechanism of injury: fall    Injury location:  Torso and leg  Torso injury location:  Back  Leg injury location:  R knee  Incident location:  Home  Time since incident:  90 minutes  Arrived directly from scene: yes    Fall:     Fall occurred:  Standing    Impact surface:  Hard floor    Entrapped after fall: no    Suspicion of alcohol use: no    Suspicion of drug use: no    Prior to arrival data:     Patient ambulatory at scene: yes      Blood loss:  None    Responsiveness at scene:  Alert    Orientation at scene:  Person, place, situation and time    Loss of consciousness: no      Amnesic to event: no    Associated symptoms: back pain    Associated symptoms: no abdominal pain, no blindness, no chest pain, no difficulty breathing, no headaches, no hearing loss, no loss of consciousness, no nausea, no neck pain, no seizures and no vomiting    Risk factors: anticoagulation therapy and COPD        Review of Systems   Constitutional:  Negative.    HENT: Negative for hearing loss.    Eyes: Negative.  Negative for blindness.   Respiratory: Negative.    Cardiovascular: Negative for chest pain.   Gastrointestinal: Negative for abdominal pain, nausea and vomiting.   Genitourinary: Negative.    Musculoskeletal: Positive for arthralgias and back pain. Negative for neck pain.   Skin: Negative.    Neurological: Positive for dizziness. Negative for seizures, loss of consciousness, syncope, light-headedness and headaches.   Psychiatric/Behavioral: Negative.    All other systems reviewed and are negative.      Past Medical History:   Diagnosis Date   • Arthritis    • Atrial fibrillation    • COPD (chronic obstructive pulmonary disease)    • Hypertension    • Oxygen dependent     AS NEEDED AT BEDTIME 2L   • Seizures        No Known Allergies    Past Surgical History:   Procedure Laterality Date   • ABDOMINAL SURGERY     • CHOLECYSTECTOMY     • GASTRIC BYPASS     • ANAND FILTER INSERTION JUGULAR     • INCISION AND DRAINAGE LEG Right 8/15/2017    Procedure: INCISION AND DRAINAGE ABSCESS;  Surgeon: Diana Sutherland MD;  Location: [facility];  Service:    • KNEE SURGERY     • WOUND EXPLORATION LEG Right 8/30/2017    Procedure: Exploration right leg wound;  Surgeon: Leo Ragland MD;  Location: [facility];  Service:        Family History   Problem Relation Age of Onset   • Hypertension Mother    • Hypertension Father        Social History     Social History   • Marital status:      Spouse name: N/A   • Number of children: N/A   • Years of education: N/A     Social History Main Topics   • Smoking status: Current Every Day Smoker     Packs/day: 1.00     Years: 10.00     Types: Cigarettes   • Smokeless tobacco: None   • Alcohol use Yes   • Drug use: No   • Sexual activity: Defer     Other Topics Concern   • None     Social History Narrative           Objective   Physical Exam   Constitutional: He is oriented to person, place, and time. He appears  well-developed and well-nourished. No distress.   Patient is morbidly obese   HENT:   Head: Normocephalic and atraumatic.   Right Ear: External ear normal.   Left Ear: External ear normal.   Nose: Nose normal.   Mouth/Throat: Oropharynx is clear and moist.   Eyes: Conjunctivae and EOM are normal. Pupils are equal, round, and reactive to light.   Neck: Normal range of motion and full passive range of motion without pain. Neck supple. No spinous process tenderness and no muscular tenderness present. Normal range of motion present.   Cardiovascular: Normal rate, regular rhythm, normal heart sounds and intact distal pulses.    Pulmonary/Chest: Effort normal and breath sounds normal. No respiratory distress. He has no wheezes. He exhibits no tenderness.   Abdominal: Soft. Bowel sounds are normal. There is no tenderness. There is no rebound and no guarding.   Musculoskeletal: He exhibits tenderness (right knee). He exhibits no deformity.   Patient has pain with palpation to the mid thoracic spine to the lower lumbar spine along the midline.  No bruising or deformity seen.  Patient is able to move around and sit on the edge of the bed.  Mild tenderness to the right knee, no bruising or swelling.  Pain with flexion.   Neurological: He is alert and oriented to person, place, and time.   Skin: Skin is warm and dry.   Psychiatric: He has a normal mood and affect. His behavior is normal. Judgment and thought content normal.   Nursing note and vitals reviewed.      Procedures         ED Course  ED Course   Value Comment By Time    Dr. Wheeler reviewed patient's knee x-ray.  Will send to ad to rule out patella fracture. LI Jones 02/18 1957    Endorsed to LI Ruiz 02/18 2004   CT Thoracic Spine Without Contrast No evidence for acute fracture or subluxation of the thoracic spine. There is posterior osteophyte disc complex at T7-T8 with moderate central canal stenosis. Corona De Jesus, ALECIA 02/18  2015   CT Lumbar Spine Without Contrast Advanced multilevel degenerative disc and facet changes. No acute fracture or subluxation. Corona De Jesus, ALECIA 02/18 2031   XR Knee 4+ View Right Fracture through the superior pole of the patella. Tricompartment primary osteoarthritis. Corona De Jesus, APRAURORA 02/18 2032                  MDM  Number of Diagnoses or Management Options  Closed nondisplaced fracture of right patella, unspecified fracture morphology, initial encounter: new and requires workup  Thoracic myofascial strain, initial encounter: new and requires workup     Amount and/or Complexity of Data Reviewed  Tests in the radiology section of CPT®: reviewed and ordered    Risk of Complications, Morbidity, and/or Mortality  Presenting problems: moderate  Diagnostic procedures: moderate  Management options: moderate    Patient Progress  Patient progress: improved      Final diagnoses:   Thoracic myofascial strain, initial encounter   Closed nondisplaced fracture of right patella, unspecified fracture morphology, initial encounter            Corona De Jseus, ALECIA  02/18/18 2036       Corona De Jesus, ALECIA  02/18/18 2037

## 2018-02-18 NOTE — ED NOTES
Pt states he fell at home in his bathroom, pt states he is having severe mid-lower back pain as well as right knee pain at this time 10/10     Louann Guevara, NANCY  02/18/18 1650

## 2021-03-23 ENCOUNTER — BULK ORDERING (OUTPATIENT)
Dept: CASE MANAGEMENT | Facility: OTHER | Age: 51
End: 2021-03-23

## 2021-03-23 DIAGNOSIS — Z23 IMMUNIZATION DUE: ICD-10-CM

## 2021-06-15 ENCOUNTER — APPOINTMENT (OUTPATIENT)
Dept: GENERAL RADIOLOGY | Facility: HOSPITAL | Age: 51
End: 2021-06-15

## 2021-06-15 ENCOUNTER — APPOINTMENT (OUTPATIENT)
Dept: NUCLEAR MEDICINE | Facility: HOSPITAL | Age: 51
End: 2021-06-15

## 2021-06-15 ENCOUNTER — APPOINTMENT (OUTPATIENT)
Dept: CARDIOLOGY | Facility: HOSPITAL | Age: 51
End: 2021-06-15

## 2021-06-15 ENCOUNTER — HOSPITAL ENCOUNTER (INPATIENT)
Facility: HOSPITAL | Age: 51
LOS: 6 days | Discharge: HOME | End: 2021-06-21
Attending: FAMILY MEDICINE | Admitting: INTERNAL MEDICINE

## 2021-06-15 DIAGNOSIS — I48.91 ATRIAL FIBRILLATION WITH RAPID VENTRICULAR RESPONSE (HCC): Primary | ICD-10-CM

## 2021-06-15 DIAGNOSIS — R07.9 CHEST PAIN, UNSPECIFIED TYPE: ICD-10-CM

## 2021-06-15 DIAGNOSIS — R45.851 SUICIDAL IDEATION: ICD-10-CM

## 2021-06-15 LAB
6-ACETYL MORPHINE: NEGATIVE
ALBUMIN SERPL-MCNC: 3.74 G/DL (ref 3.5–5.2)
ALBUMIN/GLOB SERPL: 0.9 G/DL
ALP SERPL-CCNC: 118 U/L (ref 39–117)
ALT SERPL W P-5'-P-CCNC: 48 U/L (ref 1–41)
AMPHET+METHAMPHET UR QL: POSITIVE
ANION GAP SERPL CALCULATED.3IONS-SCNC: 14.2 MMOL/L (ref 5–15)
APAP SERPL-MCNC: <5 MCG/ML (ref 0–30)
AST SERPL-CCNC: 88 U/L (ref 1–40)
BACTERIA UR QL AUTO: ABNORMAL /HPF
BARBITURATES UR QL SCN: NEGATIVE
BASOPHILS # BLD AUTO: 0.03 10*3/MM3 (ref 0–0.2)
BASOPHILS NFR BLD AUTO: 0.4 % (ref 0–1.5)
BENZODIAZ UR QL SCN: NEGATIVE
BH CV ECHO MEAS - ACS: 2.3 CM
BH CV ECHO MEAS - AO ROOT AREA (BSA CORRECTED): 1.3
BH CV ECHO MEAS - AO ROOT AREA: 10.5 CM^2
BH CV ECHO MEAS - AO ROOT DIAM: 3.7 CM
BH CV ECHO MEAS - BSA(HAYCOCK): 3 M^2
BH CV ECHO MEAS - BSA: 2.7 M^2
BH CV ECHO MEAS - BZI_BMI: 54.7 KILOGRAMS/M^2
BH CV ECHO MEAS - BZI_METRIC_HEIGHT: 177.8 CM
BH CV ECHO MEAS - BZI_METRIC_WEIGHT: 172.8 KG
BH CV ECHO MEAS - EDV(CUBED): 125 ML
BH CV ECHO MEAS - EDV(MOD-SP4): 96.5 ML
BH CV ECHO MEAS - EDV(TEICH): 118.2 ML
BH CV ECHO MEAS - EF(CUBED): 31.9 %
BH CV ECHO MEAS - EF(MOD-SP4): 58.3 %
BH CV ECHO MEAS - EF(TEICH): 25.8 %
BH CV ECHO MEAS - ESV(CUBED): 85.2 ML
BH CV ECHO MEAS - ESV(MOD-SP4): 40.2 ML
BH CV ECHO MEAS - ESV(TEICH): 87.7 ML
BH CV ECHO MEAS - FS: 12 %
BH CV ECHO MEAS - IVS/LVPW: 1.2
BH CV ECHO MEAS - IVSD: 1.5 CM
BH CV ECHO MEAS - LA DIMENSION: 6 CM
BH CV ECHO MEAS - LA/AO: 1.8
BH CV ECHO MEAS - LV DIASTOLIC VOL/BSA (35-75): 35.2 ML/M^2
BH CV ECHO MEAS - LV MASS(C)D: 291.4 GRAMS
BH CV ECHO MEAS - LV MASS(C)DI: 106.2 GRAMS/M^2
BH CV ECHO MEAS - LV SYSTOLIC VOL/BSA (12-30): 14.6 ML/M^2
BH CV ECHO MEAS - LVIDD: 5 CM
BH CV ECHO MEAS - LVIDS: 4.4 CM
BH CV ECHO MEAS - LVLD AP4: 7.9 CM
BH CV ECHO MEAS - LVLS AP4: 8.2 CM
BH CV ECHO MEAS - LVOT AREA (M): 3.8 CM^2
BH CV ECHO MEAS - LVOT AREA: 3.8 CM^2
BH CV ECHO MEAS - LVOT DIAM: 2.2 CM
BH CV ECHO MEAS - LVPWD: 1.3 CM
BH CV ECHO MEAS - MV A MAX VEL: 34.5 CM/SEC
BH CV ECHO MEAS - MV E MAX VEL: 139 CM/SEC
BH CV ECHO MEAS - MV E/A: 4
BH CV ECHO MEAS - PA ACC TIME: 0.12 SEC
BH CV ECHO MEAS - PA PR(ACCEL): 25 MMHG
BH CV ECHO MEAS - SI(CUBED): 14.5 ML/M^2
BH CV ECHO MEAS - SI(MOD-SP4): 20.5 ML/M^2
BH CV ECHO MEAS - SI(TEICH): 11.1 ML/M^2
BH CV ECHO MEAS - SV(CUBED): 39.8 ML
BH CV ECHO MEAS - SV(MOD-SP4): 56.3 ML
BH CV ECHO MEAS - SV(TEICH): 30.6 ML
BH CV NUCLEAR PRIOR STUDY: 3
BH CV REST NUCLEAR ISOTOPE DOSE: 9.8 MCI
BH CV STRESS BP STAGE 1: NORMAL
BH CV STRESS BP STAGE 2: NORMAL
BH CV STRESS COMMENTS STAGE 1: NORMAL
BH CV STRESS COMMENTS STAGE 2: NORMAL
BH CV STRESS DOSE REGADENOSON STAGE 1: 0.4
BH CV STRESS DURATION MIN STAGE 1: 0
BH CV STRESS DURATION MIN STAGE 2: 4
BH CV STRESS DURATION SEC STAGE 1: 10
BH CV STRESS DURATION SEC STAGE 2: 0
BH CV STRESS HR STAGE 1: 115
BH CV STRESS HR STAGE 2: 96
BH CV STRESS NUCLEAR ISOTOPE DOSE: 28.4 MCI
BH CV STRESS PROTOCOL 1: NORMAL
BH CV STRESS RECOVERY BP: NORMAL MMHG
BH CV STRESS RECOVERY HR: 119 BPM
BH CV STRESS STAGE 1: 1
BH CV STRESS STAGE 2: 2
BILIRUB SERPL-MCNC: 0.4 MG/DL (ref 0–1.2)
BILIRUB UR QL STRIP: NEGATIVE
BUN SERPL-MCNC: 7 MG/DL (ref 6–20)
BUN/CREAT SERPL: 10.8 (ref 7–25)
BUPRENORPHINE SERPL-MCNC: NEGATIVE NG/ML
CALCIUM SPEC-SCNC: 8.8 MG/DL (ref 8.6–10.5)
CANNABINOIDS SERPL QL: NEGATIVE
CHLORIDE SERPL-SCNC: 94 MMOL/L (ref 98–107)
CK SERPL-CCNC: 61 U/L (ref 20–200)
CLARITY UR: CLEAR
CO2 SERPL-SCNC: 22.8 MMOL/L (ref 22–29)
COCAINE UR QL: NEGATIVE
COLOR UR: YELLOW
CREAT SERPL-MCNC: 0.65 MG/DL (ref 0.76–1.27)
CRP SERPL-MCNC: <0.3 MG/DL (ref 0–0.5)
DEPRECATED RDW RBC AUTO: 52.3 FL (ref 37–54)
EOSINOPHIL # BLD AUTO: 0.25 10*3/MM3 (ref 0–0.4)
EOSINOPHIL NFR BLD AUTO: 3.6 % (ref 0.3–6.2)
ERYTHROCYTE [DISTWIDTH] IN BLOOD BY AUTOMATED COUNT: 14.9 % (ref 12.3–15.4)
ETHANOL BLD-MCNC: 127 MG/DL (ref 0–10)
ETHANOL BLD-MCNC: 91 MG/DL (ref 0–10)
ETHANOL UR QL: 0.09 %
ETHANOL UR QL: 0.13 %
FLUAV RNA RESP QL NAA+PROBE: NOT DETECTED
FLUBV RNA RESP QL NAA+PROBE: NOT DETECTED
GFR SERPL CREATININE-BSD FRML MDRD: 130 ML/MIN/1.73
GLOBULIN UR ELPH-MCNC: 4 GM/DL
GLUCOSE BLDC GLUCOMTR-MCNC: 129 MG/DL (ref 70–130)
GLUCOSE SERPL-MCNC: 107 MG/DL (ref 65–99)
GLUCOSE UR STRIP-MCNC: NEGATIVE MG/DL
HAV IGM SERPL QL IA: ABNORMAL
HBV CORE IGM SERPL QL IA: ABNORMAL
HBV SURFACE AG SERPL QL IA: ABNORMAL
HCT VFR BLD AUTO: 42.8 % (ref 37.5–51)
HCV AB SER DONR QL: REACTIVE
HGB BLD-MCNC: 13.9 G/DL (ref 13–17.7)
HGB UR QL STRIP.AUTO: NEGATIVE
HYALINE CASTS UR QL AUTO: ABNORMAL /LPF
IMM GRANULOCYTES # BLD AUTO: 0.02 10*3/MM3 (ref 0–0.05)
IMM GRANULOCYTES NFR BLD AUTO: 0.3 % (ref 0–0.5)
KETONES UR QL STRIP: NEGATIVE
LEUKOCYTE ESTERASE UR QL STRIP.AUTO: ABNORMAL
LV EF NUC BP: 41 %
LYMPHOCYTES # BLD AUTO: 1.34 10*3/MM3 (ref 0.7–3.1)
LYMPHOCYTES NFR BLD AUTO: 19.2 % (ref 19.6–45.3)
MAGNESIUM SERPL-MCNC: 1.5 MG/DL (ref 1.6–2.6)
MAXIMAL PREDICTED HEART RATE: 169 BPM
MAXIMAL PREDICTED HEART RATE: 169 BPM
MCH RBC QN AUTO: 30.8 PG (ref 26.6–33)
MCHC RBC AUTO-ENTMCNC: 32.5 G/DL (ref 31.5–35.7)
MCV RBC AUTO: 94.7 FL (ref 79–97)
METHADONE UR QL SCN: NEGATIVE
MONOCYTES # BLD AUTO: 0.36 10*3/MM3 (ref 0.1–0.9)
MONOCYTES NFR BLD AUTO: 5.2 % (ref 5–12)
NEUTROPHILS NFR BLD AUTO: 4.98 10*3/MM3 (ref 1.7–7)
NEUTROPHILS NFR BLD AUTO: 71.3 % (ref 42.7–76)
NITRITE UR QL STRIP: NEGATIVE
NRBC BLD AUTO-RTO: 0 /100 WBC (ref 0–0.2)
OPIATES UR QL: NEGATIVE
OXYCODONE UR QL SCN: POSITIVE
PCP UR QL SCN: NEGATIVE
PERCENT MAX PREDICTED HR: 56.8 %
PH UR STRIP.AUTO: 5.5 [PH] (ref 5–8)
PLATELET # BLD AUTO: 248 10*3/MM3 (ref 140–450)
PMV BLD AUTO: 9.9 FL (ref 6–12)
POTASSIUM SERPL-SCNC: 4 MMOL/L (ref 3.5–5.2)
PROT SERPL-MCNC: 7.7 G/DL (ref 6–8.5)
PROT UR QL STRIP: NEGATIVE
QT INTERVAL: 336 MS
QTC INTERVAL: 481 MS
RBC # BLD AUTO: 4.52 10*6/MM3 (ref 4.14–5.8)
RBC # UR: ABNORMAL /HPF
REF LAB TEST METHOD: ABNORMAL
SARS-COV-2 RNA RESP QL NAA+PROBE: NOT DETECTED
SODIUM SERPL-SCNC: 131 MMOL/L (ref 136–145)
SP GR UR STRIP: <=1.005 (ref 1–1.03)
SQUAMOUS #/AREA URNS HPF: ABNORMAL /HPF
STRESS BASELINE BP: NORMAL MMHG
STRESS BASELINE HR: 93 BPM
STRESS PERCENT HR: 67 %
STRESS POST PEAK BP: NORMAL MMHG
STRESS POST PEAK HR: 96 BPM
STRESS TARGET HR: 144 BPM
STRESS TARGET HR: 144 BPM
TROPONIN T SERPL-MCNC: <0.01 NG/ML (ref 0–0.03)
TSH SERPL DL<=0.05 MIU/L-ACNC: 0.56 UIU/ML (ref 0.27–4.2)
UROBILINOGEN UR QL STRIP: ABNORMAL
WBC # BLD AUTO: 6.98 10*3/MM3 (ref 3.4–10.8)
WBC UR QL AUTO: ABNORMAL /HPF

## 2021-06-15 PROCEDURE — 25010000002 MAGNESIUM SULFATE 2 GM/50ML SOLUTION: Performed by: FAMILY MEDICINE

## 2021-06-15 PROCEDURE — 93306 TTE W/DOPPLER COMPLETE: CPT

## 2021-06-15 PROCEDURE — 80307 DRUG TEST PRSMV CHEM ANLYZR: CPT | Performed by: FAMILY MEDICINE

## 2021-06-15 PROCEDURE — 99223 1ST HOSP IP/OBS HIGH 75: CPT | Performed by: INTERNAL MEDICINE

## 2021-06-15 PROCEDURE — 93005 ELECTROCARDIOGRAM TRACING: CPT | Performed by: INTERNAL MEDICINE

## 2021-06-15 PROCEDURE — 82962 GLUCOSE BLOOD TEST: CPT

## 2021-06-15 PROCEDURE — 25010000002 THIAMINE PER 100 MG: Performed by: INTERNAL MEDICINE

## 2021-06-15 PROCEDURE — 82077 ASSAY SPEC XCP UR&BREATH IA: CPT | Performed by: FAMILY MEDICINE

## 2021-06-15 PROCEDURE — 99221 1ST HOSP IP/OBS SF/LOW 40: CPT | Performed by: PSYCHIATRY & NEUROLOGY

## 2021-06-15 PROCEDURE — 99285 EMERGENCY DEPT VISIT HI MDM: CPT

## 2021-06-15 PROCEDURE — 93017 CV STRESS TEST TRACING ONLY: CPT

## 2021-06-15 PROCEDURE — 84484 ASSAY OF TROPONIN QUANT: CPT | Performed by: PHYSICIAN ASSISTANT

## 2021-06-15 PROCEDURE — 94799 UNLISTED PULMONARY SVC/PX: CPT

## 2021-06-15 PROCEDURE — 81001 URINALYSIS AUTO W/SCOPE: CPT | Performed by: FAMILY MEDICINE

## 2021-06-15 PROCEDURE — 83735 ASSAY OF MAGNESIUM: CPT | Performed by: FAMILY MEDICINE

## 2021-06-15 PROCEDURE — 78452 HT MUSCLE IMAGE SPECT MULT: CPT

## 2021-06-15 PROCEDURE — 93005 ELECTROCARDIOGRAM TRACING: CPT | Performed by: FAMILY MEDICINE

## 2021-06-15 PROCEDURE — 93306 TTE W/DOPPLER COMPLETE: CPT | Performed by: INTERNAL MEDICINE

## 2021-06-15 PROCEDURE — 78452 HT MUSCLE IMAGE SPECT MULT: CPT | Performed by: INTERNAL MEDICINE

## 2021-06-15 PROCEDURE — 0 TECHNETIUM SESTAMIBI: Performed by: INTERNAL MEDICINE

## 2021-06-15 PROCEDURE — 93018 CV STRESS TEST I&R ONLY: CPT | Performed by: INTERNAL MEDICINE

## 2021-06-15 PROCEDURE — 80143 DRUG ASSAY ACETAMINOPHEN: CPT | Performed by: FAMILY MEDICINE

## 2021-06-15 PROCEDURE — 71045 X-RAY EXAM CHEST 1 VIEW: CPT

## 2021-06-15 PROCEDURE — 80074 ACUTE HEPATITIS PANEL: CPT | Performed by: PHYSICIAN ASSISTANT

## 2021-06-15 PROCEDURE — 82550 ASSAY OF CK (CPK): CPT | Performed by: FAMILY MEDICINE

## 2021-06-15 PROCEDURE — 25010000002 REGADENOSON 0.4 MG/5ML SOLUTION: Performed by: NURSE PRACTITIONER

## 2021-06-15 PROCEDURE — 80053 COMPREHEN METABOLIC PANEL: CPT | Performed by: FAMILY MEDICINE

## 2021-06-15 PROCEDURE — 85025 COMPLETE CBC W/AUTO DIFF WBC: CPT | Performed by: FAMILY MEDICINE

## 2021-06-15 PROCEDURE — A9500 TC99M SESTAMIBI: HCPCS | Performed by: INTERNAL MEDICINE

## 2021-06-15 PROCEDURE — 84484 ASSAY OF TROPONIN QUANT: CPT | Performed by: FAMILY MEDICINE

## 2021-06-15 PROCEDURE — 87636 SARSCOV2 & INF A&B AMP PRB: CPT | Performed by: FAMILY MEDICINE

## 2021-06-15 PROCEDURE — 86140 C-REACTIVE PROTEIN: CPT | Performed by: FAMILY MEDICINE

## 2021-06-15 PROCEDURE — 84443 ASSAY THYROID STIM HORMONE: CPT | Performed by: FAMILY MEDICINE

## 2021-06-15 RX ORDER — BUSPIRONE HYDROCHLORIDE 5 MG/1
7.5 TABLET ORAL EVERY 12 HOURS SCHEDULED
Status: DISCONTINUED | OUTPATIENT
Start: 2021-06-15 | End: 2021-06-21 | Stop reason: HOSPADM

## 2021-06-15 RX ORDER — SODIUM CHLORIDE 0.9 % (FLUSH) 0.9 %
3 SYRINGE (ML) INJECTION EVERY 12 HOURS SCHEDULED
Status: DISCONTINUED | OUTPATIENT
Start: 2021-06-15 | End: 2021-06-21 | Stop reason: HOSPADM

## 2021-06-15 RX ORDER — POTASSIUM CHLORIDE 750 MG/1
10 TABLET, FILM COATED, EXTENDED RELEASE ORAL DAILY
Status: CANCELLED | OUTPATIENT
Start: 2021-06-15

## 2021-06-15 RX ORDER — TIZANIDINE 4 MG/1
4 TABLET ORAL 2 TIMES DAILY PRN
COMMUNITY
End: 2022-07-27

## 2021-06-15 RX ORDER — LORAZEPAM 2 MG/ML
2 INJECTION INTRAMUSCULAR
Status: DISCONTINUED | OUTPATIENT
Start: 2021-06-15 | End: 2021-06-21 | Stop reason: HOSPADM

## 2021-06-15 RX ORDER — LORAZEPAM 1 MG/1
1 TABLET ORAL
Status: DISCONTINUED | OUTPATIENT
Start: 2021-06-15 | End: 2021-06-21 | Stop reason: HOSPADM

## 2021-06-15 RX ORDER — OXYCODONE HYDROCHLORIDE 15 MG/1
15 TABLET ORAL 4 TIMES DAILY PRN
Status: DISCONTINUED | OUTPATIENT
Start: 2021-06-15 | End: 2021-06-21 | Stop reason: HOSPADM

## 2021-06-15 RX ORDER — PANTOPRAZOLE SODIUM 40 MG/1
40 TABLET, DELAYED RELEASE ORAL
Status: DISCONTINUED | OUTPATIENT
Start: 2021-06-15 | End: 2021-06-15

## 2021-06-15 RX ORDER — MAGNESIUM SULFATE HEPTAHYDRATE 40 MG/ML
2 INJECTION, SOLUTION INTRAVENOUS ONCE
Status: COMPLETED | OUTPATIENT
Start: 2021-06-15 | End: 2021-06-15

## 2021-06-15 RX ORDER — NITROGLYCERIN 0.4 MG/1
0.4 TABLET SUBLINGUAL
Status: DISCONTINUED | OUTPATIENT
Start: 2021-06-15 | End: 2021-06-21 | Stop reason: HOSPADM

## 2021-06-15 RX ORDER — LISINOPRIL 10 MG/1
20 TABLET ORAL EVERY 12 HOURS SCHEDULED
Status: DISCONTINUED | OUTPATIENT
Start: 2021-06-15 | End: 2021-06-16

## 2021-06-15 RX ORDER — ASPIRIN 81 MG/1
81 TABLET ORAL DAILY
Status: DISCONTINUED | OUTPATIENT
Start: 2021-06-15 | End: 2021-06-21 | Stop reason: HOSPADM

## 2021-06-15 RX ORDER — MAGNESIUM SULFATE HEPTAHYDRATE 40 MG/ML
2 INJECTION, SOLUTION INTRAVENOUS AS NEEDED
Status: DISCONTINUED | OUTPATIENT
Start: 2021-06-15 | End: 2021-06-21 | Stop reason: HOSPADM

## 2021-06-15 RX ORDER — LEVETIRACETAM 500 MG/1
1500 TABLET ORAL EVERY 12 HOURS SCHEDULED
Status: DISCONTINUED | OUTPATIENT
Start: 2021-06-15 | End: 2021-06-21 | Stop reason: HOSPADM

## 2021-06-15 RX ORDER — ROSUVASTATIN CALCIUM 10 MG/1
10 TABLET, COATED ORAL NIGHTLY
Status: DISCONTINUED | OUTPATIENT
Start: 2021-06-15 | End: 2021-06-16

## 2021-06-15 RX ORDER — MELOXICAM 7.5 MG/1
15 TABLET ORAL DAILY
Status: CANCELLED | OUTPATIENT
Start: 2021-06-15

## 2021-06-15 RX ORDER — ALBUTEROL SULFATE 2.5 MG/3ML
2.5 SOLUTION RESPIRATORY (INHALATION) EVERY 6 HOURS PRN
Status: DISCONTINUED | OUTPATIENT
Start: 2021-06-15 | End: 2021-06-18

## 2021-06-15 RX ORDER — TIZANIDINE 4 MG/1
4 TABLET ORAL 2 TIMES DAILY PRN
Status: DISCONTINUED | OUTPATIENT
Start: 2021-06-15 | End: 2021-06-21 | Stop reason: HOSPADM

## 2021-06-15 RX ORDER — AZELASTINE 1 MG/ML
1 SPRAY, METERED NASAL 2 TIMES DAILY
COMMUNITY

## 2021-06-15 RX ORDER — TAMSULOSIN HYDROCHLORIDE 0.4 MG/1
0.4 CAPSULE ORAL DAILY
Status: DISCONTINUED | OUTPATIENT
Start: 2021-06-15 | End: 2021-06-21 | Stop reason: HOSPADM

## 2021-06-15 RX ORDER — PANTOPRAZOLE SODIUM 40 MG/1
40 TABLET, DELAYED RELEASE ORAL EVERY MORNING
Status: DISCONTINUED | OUTPATIENT
Start: 2021-06-16 | End: 2021-06-21 | Stop reason: HOSPADM

## 2021-06-15 RX ORDER — MAGNESIUM SULFATE HEPTAHYDRATE 40 MG/ML
2 INJECTION, SOLUTION INTRAVENOUS
Status: DISPENSED | OUTPATIENT
Start: 2021-06-15 | End: 2021-06-15

## 2021-06-15 RX ORDER — MAGNESIUM SULFATE HEPTAHYDRATE 40 MG/ML
4 INJECTION, SOLUTION INTRAVENOUS AS NEEDED
Status: DISCONTINUED | OUTPATIENT
Start: 2021-06-15 | End: 2021-06-21 | Stop reason: HOSPADM

## 2021-06-15 RX ORDER — MULTIPLE VITAMINS W/ MINERALS TAB 9MG-400MCG
1 TAB ORAL DAILY
Status: DISCONTINUED | OUTPATIENT
Start: 2021-06-15 | End: 2021-06-21 | Stop reason: HOSPADM

## 2021-06-15 RX ORDER — METOPROLOL SUCCINATE 100 MG/1
100 TABLET, EXTENDED RELEASE ORAL DAILY
COMMUNITY
End: 2021-06-21 | Stop reason: HOSPADM

## 2021-06-15 RX ORDER — LORAZEPAM 2 MG/ML
1 INJECTION INTRAMUSCULAR
Status: DISCONTINUED | OUTPATIENT
Start: 2021-06-15 | End: 2021-06-21 | Stop reason: HOSPADM

## 2021-06-15 RX ORDER — COLCHICINE 0.6 MG/1
0.6 TABLET ORAL 3 TIMES DAILY PRN
Status: ON HOLD | COMMUNITY
End: 2021-06-21 | Stop reason: SDUPTHER

## 2021-06-15 RX ORDER — LORAZEPAM 2 MG/1
2 TABLET ORAL
Status: DISCONTINUED | OUTPATIENT
Start: 2021-06-15 | End: 2021-06-21 | Stop reason: HOSPADM

## 2021-06-15 RX ORDER — ATORVASTATIN CALCIUM 40 MG/1
40 TABLET, FILM COATED ORAL NIGHTLY
Status: DISCONTINUED | OUTPATIENT
Start: 2021-06-15 | End: 2021-06-15

## 2021-06-15 RX ORDER — LISINOPRIL 20 MG/1
20 TABLET ORAL 2 TIMES DAILY
COMMUNITY
End: 2021-06-21 | Stop reason: HOSPADM

## 2021-06-15 RX ORDER — MULTIPLE VITAMINS W/ MINERALS TAB 9MG-400MCG
1 TAB ORAL DAILY
COMMUNITY

## 2021-06-15 RX ORDER — TAMSULOSIN HYDROCHLORIDE 0.4 MG/1
1 CAPSULE ORAL DAILY
COMMUNITY

## 2021-06-15 RX ORDER — ARIPIPRAZOLE 10 MG/1
10 TABLET ORAL DAILY
Status: DISCONTINUED | OUTPATIENT
Start: 2021-06-15 | End: 2021-06-21 | Stop reason: HOSPADM

## 2021-06-15 RX ORDER — FLUTICASONE PROPIONATE 50 MCG
2 SPRAY, SUSPENSION (ML) NASAL DAILY
Status: DISCONTINUED | OUTPATIENT
Start: 2021-06-16 | End: 2021-06-21 | Stop reason: HOSPADM

## 2021-06-15 RX ORDER — CLONIDINE HYDROCHLORIDE 0.2 MG/1
0.2 TABLET ORAL EVERY 12 HOURS SCHEDULED
Status: DISCONTINUED | OUTPATIENT
Start: 2021-06-15 | End: 2021-06-16

## 2021-06-15 RX ORDER — AZELASTINE 1 MG/ML
1 SPRAY, METERED NASAL 2 TIMES DAILY
Status: DISCONTINUED | OUTPATIENT
Start: 2021-06-15 | End: 2021-06-15 | Stop reason: CLARIF

## 2021-06-15 RX ORDER — DULOXETIN HYDROCHLORIDE 60 MG/1
60 CAPSULE, DELAYED RELEASE ORAL 2 TIMES DAILY
Status: DISCONTINUED | OUTPATIENT
Start: 2021-06-15 | End: 2021-06-21 | Stop reason: HOSPADM

## 2021-06-15 RX ORDER — TRAZODONE HYDROCHLORIDE 50 MG/1
100 TABLET ORAL NIGHTLY
Status: DISCONTINUED | OUTPATIENT
Start: 2021-06-15 | End: 2021-06-21 | Stop reason: HOSPADM

## 2021-06-15 RX ORDER — GABAPENTIN 400 MG/1
400 CAPSULE ORAL 3 TIMES DAILY
Status: DISCONTINUED | OUTPATIENT
Start: 2021-06-15 | End: 2021-06-21 | Stop reason: HOSPADM

## 2021-06-15 RX ORDER — FUROSEMIDE 40 MG/1
40 TABLET ORAL DAILY
Status: CANCELLED | OUTPATIENT
Start: 2021-06-15

## 2021-06-15 RX ORDER — DILTIAZEM HYDROCHLORIDE 60 MG/1
60 TABLET, FILM COATED ORAL EVERY 6 HOURS SCHEDULED
Status: DISCONTINUED | OUTPATIENT
Start: 2021-06-15 | End: 2021-06-16

## 2021-06-15 RX ORDER — DILTIAZEM HYDROCHLORIDE 5 MG/ML
10 INJECTION INTRAVENOUS ONCE
Status: COMPLETED | OUTPATIENT
Start: 2021-06-15 | End: 2021-06-15

## 2021-06-15 RX ORDER — BUSPIRONE HYDROCHLORIDE 7.5 MG/1
7.5 TABLET ORAL 2 TIMES DAILY
COMMUNITY
End: 2022-05-26

## 2021-06-15 RX ORDER — SODIUM CHLORIDE 0.9 % (FLUSH) 0.9 %
3-10 SYRINGE (ML) INJECTION AS NEEDED
Status: DISCONTINUED | OUTPATIENT
Start: 2021-06-15 | End: 2021-06-21 | Stop reason: HOSPADM

## 2021-06-15 RX ORDER — COLCHICINE 0.6 MG/1
0.6 TABLET ORAL 3 TIMES DAILY PRN
Status: CANCELLED | OUTPATIENT
Start: 2021-06-15

## 2021-06-15 RX ORDER — METOPROLOL SUCCINATE 50 MG/1
100 TABLET, EXTENDED RELEASE ORAL DAILY
Status: DISCONTINUED | OUTPATIENT
Start: 2021-06-15 | End: 2021-06-16

## 2021-06-15 RX ORDER — ALLOPURINOL 300 MG/1
300 TABLET ORAL DAILY
Status: DISCONTINUED | OUTPATIENT
Start: 2021-06-15 | End: 2021-06-21 | Stop reason: HOSPADM

## 2021-06-15 RX ORDER — SODIUM CHLORIDE 0.9 % (FLUSH) 0.9 %
10 SYRINGE (ML) INJECTION AS NEEDED
Status: DISCONTINUED | OUTPATIENT
Start: 2021-06-15 | End: 2021-06-21 | Stop reason: HOSPADM

## 2021-06-15 RX ADMIN — TAMSULOSIN HYDROCHLORIDE 0.4 MG: 0.4 CAPSULE ORAL at 17:07

## 2021-06-15 RX ADMIN — LEVETIRACETAM 1500 MG: 500 TABLET, FILM COATED ORAL at 21:41

## 2021-06-15 RX ADMIN — ROSUVASTATIN CALCIUM 10 MG: 10 TABLET, FILM COATED ORAL at 21:41

## 2021-06-15 RX ADMIN — Medication 1 TABLET: at 17:00

## 2021-06-15 RX ADMIN — ARIPIPRAZOLE 10 MG: 10 TABLET ORAL at 17:00

## 2021-06-15 RX ADMIN — DILTIAZEM HYDROCHLORIDE 10 MG: 5 INJECTION INTRAVENOUS at 02:24

## 2021-06-15 RX ADMIN — DILTIAZEM HYDROCHLORIDE 60 MG: 60 TABLET, FILM COATED ORAL at 16:59

## 2021-06-15 RX ADMIN — SODIUM CHLORIDE 5 MG/HR: 900 INJECTION, SOLUTION INTRAVENOUS at 03:38

## 2021-06-15 RX ADMIN — DULOXETINE HYDROCHLORIDE 60 MG: 60 CAPSULE, DELAYED RELEASE ORAL at 21:41

## 2021-06-15 RX ADMIN — TECHNETIUM TC 99M SESTAMIBI 1 DOSE: 1 INJECTION INTRAVENOUS at 12:34

## 2021-06-15 RX ADMIN — SODIUM CHLORIDE, PRESERVATIVE FREE 3 ML: 5 INJECTION INTRAVENOUS at 21:46

## 2021-06-15 RX ADMIN — SODIUM CHLORIDE 10 MG/HR: 900 INJECTION, SOLUTION INTRAVENOUS at 10:31

## 2021-06-15 RX ADMIN — OXYCODONE HYDROCHLORIDE 15 MG: 15 TABLET ORAL at 14:29

## 2021-06-15 RX ADMIN — MAGNESIUM SULFATE 2 G: 2 INJECTION INTRAVENOUS at 05:13

## 2021-06-15 RX ADMIN — THIAMINE HYDROCHLORIDE 100 ML/HR: 100 INJECTION, SOLUTION INTRAMUSCULAR; INTRAVENOUS at 08:50

## 2021-06-15 RX ADMIN — RIVAROXABAN 20 MG: 20 TABLET, FILM COATED ORAL at 21:41

## 2021-06-15 RX ADMIN — SODIUM CHLORIDE, PRESERVATIVE FREE 3 ML: 5 INJECTION INTRAVENOUS at 08:47

## 2021-06-15 RX ADMIN — TECHNETIUM TC 99M SESTAMIBI 1 DOSE: 1 INJECTION INTRAVENOUS at 11:00

## 2021-06-15 RX ADMIN — ALLOPURINOL 300 MG: 300 TABLET ORAL at 17:00

## 2021-06-15 RX ADMIN — GABAPENTIN 400 MG: 400 CAPSULE ORAL at 16:59

## 2021-06-15 RX ADMIN — OXYCODONE HYDROCHLORIDE 15 MG: 15 TABLET ORAL at 21:41

## 2021-06-15 RX ADMIN — REGADENOSON 0.4 MG: 0.08 INJECTION, SOLUTION INTRAVENOUS at 12:34

## 2021-06-15 RX ADMIN — BUSPIRONE HYDROCHLORIDE 7.5 MG: 10 TABLET ORAL at 21:41

## 2021-06-15 RX ADMIN — METOPROLOL SUCCINATE 100 MG: 50 TABLET, EXTENDED RELEASE ORAL at 17:00

## 2021-06-15 NOTE — CONSULTS
Patient did not wear a mask. A 50 y.o. male with past medical history significant for atrial fibrillation, COPD, hypertension, nocturnal hypoxia, seizures.   Length of diagnosis unknown  What stage have you been told you are in unknown   Shortness of breath? No  Do you have sleep apnea? no  Do you use a C-PAP or Bi-PAP? No   Do you have a cough? No  Have you had any recent weight loss? no  Do you use inhalers/ emergency inhaler and how often? Yes. How Often: as needed  Do you have a nebulizer machine and how often do you use? No  Do you smoke? smoker  (1 ppd x 30yrs)  How many pillows do you use? 1 with inclined bed  Barriers to learning? No  Materials provided? COPD book  Last PFT/when/where? Unknown not interested  Do you follow a pulmonologist? no  Do you get short of breath on normal activity? no  Home Oxygen 2 Liters  Last hospital stay for COPD? unknown  COPD Zone? Green/yellow/red Yellow      Vitals:    06/15/21 0556   BP: 147/94   Pulse: 100   Resp: 18   Temp: 97.9 °F (36.6 °C)   SpO2: 99%          06/15/21  0110 06/15/21  0556   Weight: (!) 194 kg (428 lb) (!) 173 kg (381 lb 4.8 oz)    Patient had no questions or concerns the COPD book was left in patient folder to take home at discharge. If any questions or concerns please re-consult or call. Thank you.

## 2021-06-15 NOTE — PAYOR COMM NOTE
"[facility]  DINO MEDRANO  PHONE  [phone]  FAX  [phone]  NPI:  [id number]    PENDING REF#[id number]    Gio Henry (50 y.o. Male)     Date of Birth Social Security Number Address Home Phone MRN    1970  [address]  [facility] [address]  [id number]    Restoration Marital Status          None        Admission Date Admission Type Admitting Provider Attending Provider Department, Room/Bed    6/15/21 Emergency Deon Castro MD Hacker, Bill J, MD [facility] 3 [facility], 3319/1P    Discharge Date Discharge Disposition Discharge Destination                       Attending Provider: Suman Fulton MD    Allergies: No Known Allergies    Isolation: None   Infection: COVID (rule out) (06/15/21)   Code Status: CPR    Ht: 177.8 cm (70\")   Wt: 173 kg (381 lb 4.8 oz)    Admission Cmt: None   Principal Problem: None                Active Insurance as of 6/15/2021     Primary Coverage     Payor Plan Insurance Group Employer/Plan Group    ANTHEM MEDICARE REPLACEMENT ANTHEM MEDICARE ADVANTAGE [id number]     Payor Plan Address Payor Plan Phone Number Payor Plan Fax Number Effective Dates    [address] [phone]  7/1/2020 - None Entered    [facility] [address]       Subscriber Name Subscriber Birth Date Member ID       GIO HENRY 1970 [id number]           Secondary Coverage     Payor Plan Insurance Group Employer/Plan Group    [id number]      Payor Plan Address Payor Plan Phone Number Payor Plan Fax Number Effective Dates    [address]   6/1/2021 - None Entered    [facility] [id number]       Subscriber Name Subscriber Birth Date Member ID       GIO HENRY 1970 [id number]                 Emergency Contacts      (Rel.) Home Phone Work Phone Mobile Phone    Renetta Henry (Spouse) -- -- [phone]            History & Physical    No notes of this type exist for this encounter.            Emergency " Department Notes      Yvonne Argueta DO at 06/15/21 0126          Subjective   Patient is a 50-year-old male presents the emergency department for chest pain that started about an hour or so prior to arrival.  The patient was complaining of suicidal ideation according to family.  Family called police because the patient was holding a gun to his head saying he was going to shoot himself and ended all.  The patient states that he is having a very bad day and states that he thinks that he was going to do the best thing for his family.  He says that prior to any of this he was having chest pain.  The police arrived and they asked him to put the gun down and the patient refused.  Because the patient refused to put the gun down a taser was used on the patient.  The patient states that he had some chest pain after being tazed but states that most of the chest pain started beforehand.  The patient states that he is currently asymptomatic.  He does have a history of atrial fibrillation and is on blood thinners.  His heart rate is tachycardic upon arrival.  The patient states that at this time he is feeling depressed, and suicidal but denies any homicidal ideation or hallucinations.  He has no additional complaints at this time.          Review of Systems   Constitutional: Negative.  Negative for activity change, appetite change, chills, diaphoresis, fatigue and fever.   HENT: Negative for congestion, postnasal drip, rhinorrhea, sinus pressure, sinus pain, sneezing, sore throat and tinnitus.    Eyes: Negative for discharge and itching.   Respiratory: Negative for apnea, cough, choking, chest tightness, shortness of breath and wheezing.    Cardiovascular: Positive for palpitations. Negative for chest pain and leg swelling.   Gastrointestinal: Negative for abdominal distention, abdominal pain, diarrhea, nausea and vomiting.   Genitourinary: Negative for difficulty urinating and flank pain.   Musculoskeletal: Negative for  arthralgias and back pain.   Neurological: Negative for dizziness and headaches.   Psychiatric/Behavioral: Positive for dysphoric mood and suicidal ideas. Negative for agitation, confusion and self-injury.   All other systems reviewed and are negative.      Past Medical History:   Diagnosis Date   • Arthritis    • Atrial fibrillation (CMS/HCC)    • COPD (chronic obstructive pulmonary disease) (CMS/HCC)    • Hypertension    • Oxygen dependent     AS NEEDED AT BEDTIME 2L   • Seizures (CMS/HCC)        No Known Allergies    Past Surgical History:   Procedure Laterality Date   • ABDOMINAL SURGERY     • CHOLECYSTECTOMY     • GASTRIC BYPASS     • ANAND FILTER INSERTION JUGULAR     • INCISION AND DRAINAGE LEG Right 8/15/2017    Procedure: INCISION AND DRAINAGE ABSCESS;  Surgeon: Diana Sutherland MD;  Location: [facility];  Service:    • KNEE SURGERY     • WOUND EXPLORATION LEG Right 8/30/2017    Procedure: Exploration right leg wound;  Surgeon: Leo Ragland MD;  Location: [facility];  Service:        Family History   Problem Relation Age of Onset   • Hypertension Mother    • Hypertension Father        Social History     Socioeconomic History   • Marital status:      Spouse name: Not on file   • Number of children: Not on file   • Years of education: Not on file   • Highest education level: Not on file   Tobacco Use   • Smoking status: Current Every Day Smoker     Packs/day: 1.00     Years: 10.00     Pack years: 10.00     Types: Cigarettes   Substance and Sexual Activity   • Alcohol use: Yes   • Drug use: No   • Sexual activity: Defer           Objective   Physical Exam  Vitals and nursing note reviewed.   Constitutional:       General: He is not in acute distress.     Appearance: He is well-developed. He is obese. He is not ill-appearing, toxic-appearing or diaphoretic.      Comments: Super morbidly obese   HENT:      Head: Normocephalic.   Neck:      Thyroid: No thyromegaly.      Vascular: No hepatojugular  reflux or JVD.      Trachea: No tracheal deviation.   Cardiovascular:      Rate and Rhythm: Tachycardia present. Rhythm irregular.  No extrasystoles are present.     Chest Wall: PMI is not displaced.      Heart sounds: Normal heart sounds. Heart sounds not distant. No murmur heard.   No systolic murmur is present.   No diastolic murmur is present.   No friction rub. No gallop. No S3 or S4 sounds.    Pulmonary:      Effort: Pulmonary effort is normal. No tachypnea, accessory muscle usage or respiratory distress.      Breath sounds: Normal breath sounds. No decreased breath sounds, wheezing, rhonchi or rales.   Chest:      Chest wall: No mass, deformity or tenderness.   Abdominal:      General: There is no abdominal bruit.      Palpations: Abdomen is soft. There is no fluid wave, hepatomegaly or splenomegaly.   Musculoskeletal:      Cervical back: Normal range of motion and neck supple.      Right lower leg: No tenderness. No edema.      Left lower leg: No tenderness. No edema.   Lymphadenopathy:      Cervical: No cervical adenopathy.   Skin:     General: Skin is warm.      Capillary Refill: Capillary refill takes less than 2 seconds.      Coloration: Skin is not cyanotic or pale.      Findings: No ecchymosis or erythema.   Neurological:      General: No focal deficit present.      Mental Status: He is alert.   Psychiatric:         Mood and Affect: Mood normal. Mood is not anxious.         Behavior: Behavior normal. Behavior is not agitated.         Procedures          ED Course  ED Course as of Jorge Luis 15 0434   Tue Jorge Luis 15, 2021   0130 EKG is atrial fibrillation with rapid ventricular response there is a right bundle branch block and a left anterior fascicular block rate is 123 bpm QRS duration is 160 ms QT/QTc is 336/481 ms    [EG]      ED Course User Index  [EG] Yvonne Argueta DO                                           MDM  Number of Diagnoses or Management Options  Atrial fibrillation with rapid ventricular  response (CMS/[facility]): new and requires workup  Chest pain, unspecified type: new and requires workup  Suicidal ideation: new and requires workup     Amount and/or Complexity of Data Reviewed  Clinical lab tests: ordered and reviewed  Tests in the radiology section of CPT®: ordered and reviewed  Tests in the medicine section of CPT®: ordered and reviewed  Discuss the patient with other providers: yes  Independent visualization of images, tracings, or specimens: yes    Risk of Complications, Morbidity, and/or Mortality  Presenting problems: high  Diagnostic procedures: high  Management options: high    Patient Progress  Patient progress: stable      Final diagnoses:   Atrial fibrillation with rapid ventricular response (CMS/[facility])   Chest pain, unspecified type   Suicidal ideation       ED Disposition  ED Disposition     ED Disposition Condition Comment    Decision to Admit  Level of Care: Telemetry [5]   Diagnosis: Afib (CMS/[facility]) [134686]   Certification: I Certify That Inpatient Hospital Services Are Medically Necessary For Greater Than 2 Midnights            No follow-up provider specified.       Medication List      No changes were made to your prescriptions during this visit.          Yvonne Argueta DO  06/15/21 0433       Yvonne Argueta DO  06/15/21 0434      Electronically signed by Yvonne Argueta DO at 06/15/21 0434     Kanika Barnes RN at 06/15/21 0127        Suicide risk environmental assessment performed at this time in patient's room. Placed on patient chart at this time.      Kanika Barnes RN  06/15/21 0443      Electronically signed by Kanika Barnes RN at 06/15/21 0443     Kanika Barnes RN at 06/15/21 0532        Report to NANCY Cuellar at this time on 3S     Kanika Barnes RN  06/15/21 0533      Electronically signed by Kanika Barnes RN at 06/15/21 0533     Kanika Barnes RN at 06/15/21 0544        All patients belongings given to NANCY Cuellar on IP floor.      Kanika Barnes RN  06/15/21  0556      Electronically signed by Kanika Barnes RN at 06/15/21 0556         Current Facility-Administered Medications   Medication Dose Route Frequency Provider Last Rate Last Admin   • aspirin EC tablet 81 mg  81 mg Oral Daily Sandra Hill PA-C       • atorvastatin (LIPITOR) tablet 40 mg  40 mg Oral Nightly Sandra Hill PA-C       • dilTIAZem (CARDIZEM) 100 mg in 100 mL NS infusion (ADV)  5-15 mg/hr Intravenous Titrated Deon Castro MD 10 mL/hr at 06/15/21 0407 10 mg/hr at 06/15/21 0407   • LORazepam (ATIVAN) tablet 1 mg  1 mg Oral Q2H PRN Suman Fulton MD        Or   • LORazepam (ATIVAN) injection 1 mg  1 mg Intravenous Q2H PRN Suman Fulton MD        Or   • LORazepam (ATIVAN) tablet 2 mg  2 mg Oral Q1H PRN Suman Fulton MD        Or   • LORazepam (ATIVAN) injection 2 mg  2 mg Intravenous Q1H PRN Suman Fulton MD        Or   • LORazepam (ATIVAN) injection 2 mg  2 mg Intravenous Q15 Min PRN Suman Fulton MD        Or   • LORazepam (ATIVAN) injection 2 mg  2 mg Intramuscular Q15 Min PRN Suman Fulton MD       • Magnesium Sulfate 2 gram Bolus, followed by 8 gram infusion (total Mg dose 10 grams)- Mg less than or equal to 1mg/dL  2 g Intravenous PRN Sandra Hill PA-C        Or   • Magnesium Sulfate 2 gram / 50mL Infusion (GIVE X 3 BAGS TO EQUAL 6GM TOTAL DOSE) - Mg 1.1 - 1.5 mg/dl  2 g Intravenous PRN Sandra Hill PA-C        Or   • Magnesium Sulfate 4 gram infusion- Mg 1.6-1.9 mg/dL  4 g Intravenous PRN Sandra Hill PA-C       • Magnesium Sulfate 2 gram / 50mL Infusion (GIVE X 3 BAGS TO EQUAL 6GM TOTAL DOSE) - Mg 1.1 - 1.5 mg/dl  2 g Intravenous Q2H Sandra Hill PA-C       • nitroglycerin (NITROSTAT) SL tablet 0.4 mg  0.4 mg Sublingual Q5 Min PRN Deon Castro MD       • pantoprazole (PROTONIX) EC tablet 40 mg  40 mg Oral Q AM Sandra Hill PA-C       • sodium chloride 0.9 % flush 10 mL  10 mL Intravenous PRN Deon Castro  MD HUSEYIN       • sodium chloride 0.9 % flush 3 mL  3 mL Intravenous Q12H Deon Castro MD   3 mL at 06/15/21 0847   • sodium chloride 0.9 % flush 3-10 mL  3-10 mL Intravenous PRN Deon Castro MD       • thiamine (B-1) 100 mg, folic acid 1 mg in sodium chloride 0.9 % 1,000 mL infusion  100 mL/hr Intravenous Daily Suman Fluton  mL/hr at 06/15/21 0850 100 mL/hr at 06/15/21 0850     Orders (last 24 hrs)      Start     Ordered    06/16/21 0600  CBC & Differential  Morning Draw      06/15/21 0728    06/16/21 0600  Basic Metabolic Panel  Morning Draw      06/15/21 0728    06/16/21 0600  Magnesium  Morning Draw      06/15/21 0802    06/15/21 2100  atorvastatin (LIPITOR) tablet 40 mg  Nightly      06/15/21 0900    06/15/21 1000  aspirin EC tablet 81 mg  Daily      06/15/21 0900    06/15/21 1000  pantoprazole (PROTONIX) EC tablet 40 mg  Every Early Morning      06/15/21 0900    06/15/21 0900  sodium chloride 0.9 % flush 3 mL  Every 12 Hours Scheduled      06/15/21 0546    06/15/21 0900  thiamine (B-1) 100 mg, folic acid 1 mg in sodium chloride 0.9 % 1,000 mL infusion  Daily      06/15/21 0726    06/15/21 0900  Hepatitis Panel, Acute  Once      06/15/21 0900    06/15/21 0859  Adult Transthoracic Echo Complete W/ Cont if Necessary Per Protocol  Once      06/15/21 0858    06/15/21 0858  Troponin  STAT      06/15/21 0857    06/15/21 0830  Magnesium Sulfate 2 gram / 50mL Infusion (GIVE X 3 BAGS TO EQUAL 6GM TOTAL DOSE) - Mg 1.1 - 1.5 mg/dl  Every 2 Hours      06/15/21 0735    06/15/21 0802  Inpatient Cardiology Consult  Once     Specialty:  Cardiology  Provider:  Shashank Mendes MD    06/15/21 0802    06/15/21 0802  Inpatient Psychiatrist Consult  Once     Specialty:  Psychiatry  Provider:  Shin Cade MD    06/15/21 0802    06/15/21 0729  Patient Currently On Electrolyte Replacement Protocol - Please Refer to MAR for Protocol Details  Misc Nursing Order (Specify)  Daily     Comments: Patient Currently On  Electrolyte Replacement Protocol - Please Refer to MAR for Protocol Details    06/15/21 0728    06/15/21 0727  Magnesium Sulfate 2 gram Bolus, followed by 8 gram infusion (total Mg dose 10 grams)- Mg less than or equal to 1mg/dL  As Needed      06/15/21 0728    06/15/21 0727  Magnesium Sulfate 2 gram / 50mL Infusion (GIVE X 3 BAGS TO EQUAL 6GM TOTAL DOSE) - Mg 1.1 - 1.5 mg/dl  As Needed      06/15/21 0728    06/15/21 0727  Magnesium Sulfate 4 gram infusion- Mg 1.6-1.9 mg/dL  As Needed      06/15/21 0728    06/15/21 0725  LORazepam (ATIVAN) tablet 1 mg  Every 2 Hours PRN      06/15/21 0726    06/15/21 0725  LORazepam (ATIVAN) injection 1 mg  Every 2 Hours PRN      06/15/21 0726    06/15/21 0725  LORazepam (ATIVAN) tablet 2 mg  Every 1 Hour PRN      06/15/21 0726    06/15/21 0725  LORazepam (ATIVAN) injection 2 mg  Every 1 Hour PRN      06/15/21 0726    06/15/21 0725  LORazepam (ATIVAN) injection 2 mg  Every 15 Minutes PRN      06/15/21 0726    06/15/21 0725  LORazepam (ATIVAN) injection 2 mg  Every 15 Minutes PRN      06/15/21 0726    06/15/21 0725  Vital Signs  Per Hospital Policy      06/15/21 0726    06/15/21 0725  Continuous Pulse Oximetry  Continuous      06/15/21 0726    06/15/21 0725  Obtain Baseline Clinical [place] Withdrawal Assessment - Ar, Sedation Scale & Vital Signs  Once     Comments: Follow CIWA Scoring Reference Guide    06/15/21 0726    06/15/21 0725  Clinical [place] Withdrawal Assessment (CIWA-Ar)  Per Hospital Policy      06/15/21 0726    06/15/21 0725  If CIWA Score Less Than 8 Monitor Every 4 Hours & Then Discontinue Assessment - Restart Scoring Assessment & Protocol If Symptoms Reappear  Continuous      06/15/21 0726    06/15/21 0725  Obtain Pre & Post Sedation Scores With Every Lorazepam Dose - Hold For POSS Greater Than 2 or RASS of -3 or Less  Continuous      06/15/21 0726    06/15/21 0725  Seizure Precautions  Continuous      06/15/21 0726    06/15/21 0725  Notify Provider - Withdrawal   Until Discontinued      06/15/21 0726    06/15/21 0725  Notify Provider of Abnormal Lab Results  Until Discontinued      06/15/21 0726    06/15/21 0725  Notify Provider - Vitals  Until Discontinued      06/15/21 0726    06/15/21 0725  Safety Precautions  Continuous      06/15/21 0726    06/15/21 0628  Case Management  Consult  Once     Provider:  (Not yet assigned)    06/15/21 0627    06/15/21 0627  Consult for CHF Education  Once     Provider:  (Not yet assigned)    06/15/21 0627    06/15/21 0627  Diabetes Educator Consult  Once     Provider:  (Not yet assigned)    06/15/21 0627    06/15/21 0627  Inpatient COPD Education (EDU) Consult  Once     Provider:  (Not yet assigned)    06/15/21 0627    06/15/21 0627  Inpatient COPD Education (RT) Consult  Once     Provider:  (Not yet assigned)    06/15/21 0627    06/15/21 0600  Intake & Output  Every 6 Hours      06/15/21 0546    06/15/21 0547  Vital Signs  Per Hospital Policy      06/15/21 0546    06/15/21 0547  Weigh Patient  Once      06/15/21 0546    06/15/21 0547  Oxygen Therapy- Nasal Cannula; Titrate for SPO2: 90% - 95%  Continuous      06/15/21 0546    06/15/21 0547  Notify PCP of Patient Admission  Once      06/15/21 0546    06/15/21 0547  Insert Peripheral IV  Once      06/15/21 0546    06/15/21 0547  Saline Lock & Maintain IV Access  Continuous,   Status:  Canceled      06/15/21 0546    06/15/21 0547  VTE Prophylaxis Not Indicated: Other: Patient Currently Anticoagulated / Receiving Prophylaxis  Once      06/15/21 0546    06/15/21 0547  Telemetry - Maintain IV Access  Continuous      06/15/21 0546    06/15/21 0547  Continuous Cardiac Monitoring  Continuous,   Status:  Canceled      06/15/21 0546    06/15/21 0547  May Be Off Telemetry for Tests  Continuous      06/15/21 0546    06/15/21 0547  ACLS Protocol For Life Threatening Dysrhythmias (Unless Code Status Indicates Otherwise)  Continuous      06/15/21 0546    06/15/21 0547  Notify Provider if  ACLS Protocol Activated  Until Discontinued      06/15/21 0546    06/15/21 0547  Cardiac Monitoring  Continuous      06/15/21 0546    06/15/21 0547  Pulse Oximetry, Continuous  Continuous,   Status:  Canceled      06/15/21 0546    06/15/21 0547  Activity - Strict Bed Rest  Until Discontinued      06/15/21 0546    06/15/21 0547  Fall Precautions  Continuous      06/15/21 0546    06/15/21 0547  Seizure Precautions  Continuous      06/15/21 0546    06/15/21 0547  NPO Diet  Diet Effective Now      06/15/21 0546    06/15/21 0546  sodium chloride 0.9 % flush 3-10 mL  As Needed      06/15/21 0546    06/15/21 0546  Telemetry - Pulse Oximetry  Continuous PRN,   Status:  Canceled     Comments: If Patient Develops Unresponsiveness, Acute Dyspnea, Cyanosis or Suspected Hypoxemia Start Continuous Pulse Ox Monitoring, Apply Oxygen & Notify Provider    06/15/21 0546    06/15/21 0546  nitroglycerin (NITROSTAT) SL tablet 0.4 mg  Every 5 Minutes PRN      06/15/21 0546    06/15/21 0454  Ethanol  STAT      06/15/21 0453    06/15/21 0434  magnesium sulfate 2g/50 mL (PREMIX) infusion  Once      06/15/21 0432    06/15/21 0434  Suicide Precautions  Continuous      06/15/21 0433    06/15/21 0434  1:1 Patient  Continuous      06/15/21 0433    06/15/21 0431  Inpatient Admission  Once      06/15/21 0431    06/15/21 0431  Code Status and Medical Interventions:  Continuous      06/15/21 0431    06/15/21 0310  dilTIAZem (CARDIZEM) 100 mg in 100 mL NS infusion (ADV)  Titrated      06/15/21 0308    06/15/21 0244  COVID-19 and FLU A/B PCR - Swab, Nasopharynx  Once      06/15/21 0243    06/15/21 0243  Urine Drug Screen - Urine, Clean Catch  STAT      06/15/21 0243    06/15/21 0236  Urinalysis, Microscopic Only - Urine, Clean Catch  Once      06/15/21 0235    06/15/21 0127  dilTIAZem (CARDIZEM) injection 10 mg  Once      06/15/21 0125    06/15/21 0113  C-reactive Protein  Once      06/15/21 0112    06/15/21 0113  Acetaminophen Level  Once       06/15/21 0112    06/15/21 0113  Ethanol  Once      06/15/21 0112    06/15/21 0113  CK  Once      06/15/21 0112    06/15/21 0113  Magnesium  Once      06/15/21 0112    06/15/21 0113  TSH  Once      06/15/21 0112    06/15/21 0113  ECG 12 Lead  Once      06/15/21 0112    06/15/21 0113  XR Chest 1 View  1 Time Imaging      06/15/21 0112    06/15/21 0113  Cardiac Monitoring  Once,   Status:  Canceled      06/15/21 0112    06/15/21 0113  Monitor Blood Pressure  Per Hospital Policy      06/15/21 0112    06/15/21 0113  Pulse Oximetry, Continuous  Continuous,   Status:  Canceled      06/15/21 0112    06/15/21 0113  Insert peripheral IV  Once      06/15/21 0112    06/15/21 0112  sodium chloride 0.9 % flush 10 mL  As Needed      06/15/21 0112    06/15/21 0112  CBC & Differential  Once      06/15/21 0112    06/15/21 0112  Comprehensive Metabolic Panel  Once      06/15/21 0112    06/15/21 0112  Urinalysis With Microscopic If Indicated (No Culture) - Urine, Clean Catch  Once      06/15/21 0112    06/15/21 0112  Troponin  Now Then Every 2 Hours      06/15/21 0112    06/15/21 0112  CBC Auto Differential  PROCEDURE ONCE      06/15/21 0112    Unscheduled  Oxygen Therapy- Nasal Cannula; Titrate for SPO2: 90% - 95%  Continuous PRN     Comments: If Patient Develops Unresponsiveness, Acute Dyspnea, Cyanosis or Suspected Hypoxemia Start Continuous Pulse Ox Monitoring, Apply Oxygen & Notify Provider    06/15/21 0546    Unscheduled  ECG 12 Lead  As Needed     Comments: Nurse to Release if Patient Expericences Acute Chest Pain or Dysrhythmias    06/15/21 0546    Unscheduled  Potassium  As Needed     Comments: For Ventricular Arrhythmias      06/15/21 0546    Unscheduled  Magnesium  As Needed     Comments: For Ventricular Arrhythmias      06/15/21 0546    Unscheduled  Troponin  As Needed     Comments: For Chest Pain      06/15/21 0546    Unscheduled  Digoxin Level  As Needed     Comments: For Atrial Arrhythmias      06/15/21 0546     Unscheduled  Blood Gas, Arterial -With Co-Ox Panel: Yes  As Needed     Comments: Per O2 PolicyNotify Physician      06/15/21 0546    Unscheduled  Magnesium  As Needed      06/15/21 0728    Unscheduled  Potassium  As Needed      06/15/21 0728                Physician Progress Notes (last 24 hours) (Notes from 06/14/21 0903 through 06/15/21 0903)    No notes of this type exist for this encounter.         Consult Notes (last 24 hours) (Notes from 06/14/21 0903 through 06/15/21 0903)    No notes of this type exist for this encounter.

## 2021-06-15 NOTE — ED PROVIDER NOTES
Subjective   Patient is a 50-year-old male presents the emergency department for chest pain that started about an hour or so prior to arrival.  The patient was complaining of suicidal ideation according to family.  Family called police because the patient was holding a gun to his head saying he was going to shoot himself and ended all.  The patient states that he is having a very bad day and states that he thinks that he was going to do the best thing for his family.  He says that prior to any of this he was having chest pain.  The police arrived and they asked him to put the gun down and the patient refused.  Because the patient refused to put the gun down a taser was used on the patient.  The patient states that he had some chest pain after being tazed but states that most of the chest pain started beforehand.  The patient states that he is currently asymptomatic.  He does have a history of atrial fibrillation and is on blood thinners.  His heart rate is tachycardic upon arrival.  The patient states that at this time he is feeling depressed, and suicidal but denies any homicidal ideation or hallucinations.  He has no additional complaints at this time.          Review of Systems   Constitutional: Negative.  Negative for activity change, appetite change, chills, diaphoresis, fatigue and fever.   HENT: Negative for congestion, postnasal drip, rhinorrhea, sinus pressure, sinus pain, sneezing, sore throat and tinnitus.    Eyes: Negative for discharge and itching.   Respiratory: Negative for apnea, cough, choking, chest tightness, shortness of breath and wheezing.    Cardiovascular: Positive for palpitations. Negative for chest pain and leg swelling.   Gastrointestinal: Negative for abdominal distention, abdominal pain, diarrhea, nausea and vomiting.   Genitourinary: Negative for difficulty urinating and flank pain.   Musculoskeletal: Negative for arthralgias and back pain.   Neurological: Negative for dizziness and  headaches.   Psychiatric/Behavioral: Positive for dysphoric mood and suicidal ideas. Negative for agitation, confusion and self-injury.   All other systems reviewed and are negative.      Past Medical History:   Diagnosis Date   • Arthritis    • Atrial fibrillation (CMS/HCC)    • COPD (chronic obstructive pulmonary disease) (CMS/HCC)    • Hypertension    • Oxygen dependent     AS NEEDED AT BEDTIME 2L   • Seizures (CMS/HCC)        No Known Allergies    Past Surgical History:   Procedure Laterality Date   • ABDOMINAL SURGERY     • CHOLECYSTECTOMY     • GASTRIC BYPASS     • ANAND FILTER INSERTION JUGULAR     • INCISION AND DRAINAGE LEG Right 8/15/2017    Procedure: INCISION AND DRAINAGE ABSCESS;  Surgeon: Diana Sutherland MD;  Location: [facility] OR;  Service:    • KNEE SURGERY     • WOUND EXPLORATION LEG Right 8/30/2017    Procedure: Exploration right leg wound;  Surgeon: Leo Ragland MD;  Location: [facility] OR;  Service:        Family History   Problem Relation Age of Onset   • Hypertension Mother    • Hypertension Father        Social History     Socioeconomic History   • Marital status:      Spouse name: Not on file   • Number of children: Not on file   • Years of education: Not on file   • Highest education level: Not on file   Tobacco Use   • Smoking status: Current Every Day Smoker     Packs/day: 1.00     Years: 10.00     Pack years: 10.00     Types: Cigarettes   Substance and Sexual Activity   • Alcohol use: Yes   • Drug use: No   • Sexual activity: Defer           Objective   Physical Exam  Vitals and nursing note reviewed.   Constitutional:       General: He is not in acute distress.     Appearance: He is well-developed. He is obese. He is not ill-appearing, toxic-appearing or diaphoretic.      Comments: Super morbidly obese   HENT:      Head: Normocephalic.   Neck:      Thyroid: No thyromegaly.      Vascular: No hepatojugular reflux or JVD.      Trachea: No tracheal deviation.   Cardiovascular:       Rate and Rhythm: Tachycardia present. Rhythm irregular.  No extrasystoles are present.     Chest Wall: PMI is not displaced.      Heart sounds: Normal heart sounds. Heart sounds not distant. No murmur heard.   No systolic murmur is present.   No diastolic murmur is present.   No friction rub. No gallop. No S3 or S4 sounds.    Pulmonary:      Effort: Pulmonary effort is normal. No tachypnea, accessory muscle usage or respiratory distress.      Breath sounds: Normal breath sounds. No decreased breath sounds, wheezing, rhonchi or rales.   Chest:      Chest wall: No mass, deformity or tenderness.   Abdominal:      General: There is no abdominal bruit.      Palpations: Abdomen is soft. There is no fluid wave, hepatomegaly or splenomegaly.   Musculoskeletal:      Cervical back: Normal range of motion and neck supple.      Right lower leg: No tenderness. No edema.      Left lower leg: No tenderness. No edema.   Lymphadenopathy:      Cervical: No cervical adenopathy.   Skin:     General: Skin is warm.      Capillary Refill: Capillary refill takes less than 2 seconds.      Coloration: Skin is not cyanotic or pale.      Findings: No ecchymosis or erythema.   Neurological:      General: No focal deficit present.      Mental Status: He is alert.   Psychiatric:         Mood and Affect: Mood normal. Mood is not anxious.         Behavior: Behavior normal. Behavior is not agitated.         Procedures           ED Course  ED Course as of Jorge Luis 15 0434   Tue Jorge Luis 15, 2021   0130 EKG is atrial fibrillation with rapid ventricular response there is a right bundle branch block and a left anterior fascicular block rate is 123 bpm QRS duration is 160 ms QT/QTc is 336/481 ms    [EG]      ED Course User Index  [EG] Yvonne Argueta DO                                           MDM  Number of Diagnoses or Management Options  Atrial fibrillation with rapid ventricular response (CMS/HCC): new and requires workup  Chest pain, unspecified type:  new and requires workup  Suicidal ideation: new and requires workup     Amount and/or Complexity of Data Reviewed  Clinical lab tests: ordered and reviewed  Tests in the radiology section of CPT®: ordered and reviewed  Tests in the medicine section of CPT®: ordered and reviewed  Discuss the patient with other providers: yes  Independent visualization of images, tracings, or specimens: yes    Risk of Complications, Morbidity, and/or Mortality  Presenting problems: high  Diagnostic procedures: high  Management options: high    Patient Progress  Patient progress: stable      Final diagnoses:   Atrial fibrillation with rapid ventricular response (CMS/HCC)   Chest pain, unspecified type   Suicidal ideation       ED Disposition  ED Disposition     ED Disposition Condition Comment    Decision to Admit  Level of Care: Telemetry [5]   Diagnosis: Afib (CMS/[facility]) [[id number]]   Certification: I Certify That Inpatient Hospital Services Are Medically Necessary For Greater Than 2 Midnights            No follow-up provider specified.       Medication List      No changes were made to your prescriptions during this visit.          Yvonne Argueta DO  06/15/21 0433       Yvonne Argueta DO  06/15/21 0434

## 2021-06-15 NOTE — ED NOTES
Suicide risk environmental assessment performed at this time in patient's room. Placed on patient chart at this time.      Kanika Barnes RN  06/15/21 0449

## 2021-06-15 NOTE — CONSULTS
Date of Admit: 6/15/2021  Date of Consult: 06/15/21  No ref. provider found        Afib (CMS/[facility])      Assessment      1. Chest pains with features of CCS class III-IV angina.  Patient is ruled out for acute myocardial infarction.  EKG reveals nonspecific ST-T wave changes with a baseline right bundle branch block and left anterior fascicular block.  2. Atrial fibrillation which seems to be chronic with rapid ventricular response at admission.  3. Tobacco abuse.  4. Type 2 diabetes mellitus.  5. Systemic hypertension.  6. Suicidal ideation/attempt prior to admission.  7. Peripheral arterial disease involving both lower extremities.    Recommendations     1. We will treat him with aspirin, nitrates and beta-blockers for his chest pains.  2. Evaluate further with a Lexiscan sestamibi study.  3. With regards to his atrial fibrillation, will obtain an echo Doppler study and if his left atrial dimension is acceptable then may consider electrocardioversion at some point.  4. I have also encouraged him for smoking cessation.  5. Continue with rosuvastatin and rivaroxaban at current doses.    Reason for consultation: Atrial fibrillation with RVR    Subjective       Subjective     History of Present Illness     Adriano Henry is a 50 year old male with a past medical history significant for atrial fibrillation, COPD, hypertension, and seizures.  Patient presented to the ER with complaints of chest pain.  Yesterday patient reportedly had some suicidal ideation and family called police.  Patient states that prior to the police arriving he had been having chest pain but it worsened once police arrived.  He describes it as a pressure sensation in the middle of his chest that is intermittent in nature. He states that sublingual nitro does help with the pain. Denies any aggravating factors. He did have associated shortness of breath. He also reports intermittent palpitations. He is on 2 L of oxygen via nasal cannula at home due  "to history of COPD. Denies any history of coronary artery disease. He does have a history of atrial fibrillation which he states he has had for many years. He takes xarelto at home. Does not follow with a cardiologist. He is a current smoker and smokes a pack a day for \"many years.\" He reports family history premature coronary artery disease in his father. UDS was positive for amphetamines and oxycodone. Ethanol level also elevated at 91. In the ED he was noted to be in atrial fibrillation with RVR and was given Cardizem bolus and started on Cardizem drip.     Cardiac risk factors:hypercholesterolemia, hypertension, smoking, Sedentary life style and Obesity    Last Echo: 8/17/2017  · Left ventricular systolic function is normal. Estimated EF= 55-60%  · All left ventricular wall segments contract normally  · Left atrial cavity size is mildly dilated.  · The aortic valve is structurally normal. No aortic valve regurgitation is present. No aortic valve stenosis is present  · Mild mitral valve regurgitation is present  · Mild tricuspid valve regurgitation is present.  · Mild pulmonary hypertension is present  · There is no evidence of pericardial effusion    Past Medical History:   Diagnosis Date   • Arthritis    • Atrial fibrillation (CMS/HCC)    • COPD (chronic obstructive pulmonary disease) (CMS/HCC)    • Hypertension    • Oxygen dependent     AS NEEDED AT BEDTIME 2L   • Seizures (CMS/HCC)      Past Surgical History:   Procedure Laterality Date   • ABDOMINAL SURGERY     • CHOLECYSTECTOMY     • GASTRIC BYPASS     • ANAND FILTER INSERTION JUGULAR     • INCISION AND DRAINAGE LEG Right 8/15/2017    Procedure: INCISION AND DRAINAGE ABSCESS;  Surgeon: Diana Sutherland MD;  Location: [facility] OR;  Service:    • KNEE SURGERY     • WOUND EXPLORATION LEG Right 8/30/2017    Procedure: Exploration right leg wound;  Surgeon: Leo Ragland MD;  Location: [facility] OR;  Service:      Family History   Problem Relation Age of " Onset   • Hypertension Mother    • Hypertension Father      Social History     Tobacco Use   • Smoking status: Current Every Day Smoker     Packs/day: 1.00     Years: 10.00     Pack years: 10.00     Types: Cigarettes   Substance Use Topics   • Alcohol use: Yes   • Drug use: No     Medications Prior to Admission   Medication Sig Dispense Refill Last Dose   • albuterol (PROVENTIL HFA;VENTOLIN HFA) 108 (90 Base) MCG/ACT inhaler Inhale 2 puffs Every 6 (Six) Hours As Needed for Wheezing.      • albuterol (PROVENTIL) (2.5 MG/3ML) 0.083% nebulizer solution Take 2.5 mg by nebulization Every 6 (Six) Hours As Needed for Wheezing.      • allopurinol (ZYLOPRIM) 300 MG tablet Take 300 mg by mouth Daily.      • ARIPiprazole (ABILIFY) 5 MG tablet Take 5 mg by mouth Daily.      • budesonide-formoterol (SYMBICORT) 160-4.5 MCG/ACT inhaler Inhale 2 puffs 2 (Two) Times a Day.      • CloNIDine (CATAPRES) 0.2 MG tablet Take 0.2 mg by mouth 2 (Two) Times a Day.      • colchicine 0.6 MG tablet Take 1 tablet by mouth 3 (Three) Times a Day As Needed for Muscle / Joint Pain.      • DULoxetine (CYMBALTA) 60 MG capsule Take 60 mg by mouth Daily.      • furosemide (LASIX) 40 MG tablet Take 80 mg by mouth Daily.      • gabapentin (NEURONTIN) 800 MG tablet Take 800 mg by mouth 3 (Three) Times a Day.      • HYDROcodone-acetaminophen (NORCO) 7.5-325 MG per tablet Take 1 tablet by mouth Every 6 (Six) Hours As Needed for Moderate Pain . 6 tablet 0    • levETIRAcetam (KEPPRA) 750 MG tablet Take 1,500 mg by mouth 2 (Two) Times a Day.      • lisinopril (PRINIVIL,ZESTRIL) 10 MG tablet Take 1 tablet by mouth Daily. 30 tablet 2    • meloxicam (MOBIC) 7.5 MG tablet Take 7.5 mg by mouth Daily.      • metoprolol tartrate (LOPRESSOR) 25 MG tablet Take 1 tablet by mouth Every 12 (Twelve) Hours. 60 tablet 2    • niacin (NIACIN SR,NIASPAN ER) 500 MG CR capsule Take 500 mg by mouth Every Night.      • omeprazole (priLOSEC) 20 MG capsule Take 20 mg by mouth 2 (Two)  Times a Day.      • oxyCODONE (ROXICODONE) 20 MG tablet Take 20 mg by mouth Every 6 (Six) Hours As Needed for Moderate Pain .      • potassium chloride (K-DUR) 10 MEQ CR tablet Take 20 mEq by mouth Daily With Breakfast.      • rivaroxaban (XARELTO) 20 MG tablet Take 1 tablet by mouth Daily With Dinner. 30 tablet 2    • rosuvastatin (CRESTOR) 20 MG tablet Take 20 mg by mouth Daily.      • sodium hypochlorite (DAKIN'S) 0.2-0.25 % topical solution Apply daily to affected area. 473 mL 1    • traZODone (DESYREL) 100 MG tablet Take 100 mg by mouth Every Night.      • Umeclidinium-Vilanterol (ANORO ELLIPTA) 62.5-25 MCG/INH aerosol powder  Inhale 1 puff Daily.        Allergies:  Patient has no known allergies.    Review of Systems   Constitutional: Negative for chills, diaphoresis, fatigue and fever.   HENT: Negative for congestion and trouble swallowing.    Eyes: Negative for photophobia and visual disturbance.   Respiratory: Positive for chest tightness and shortness of breath.    Cardiovascular: Positive for chest pain and palpitations. Negative for leg swelling.   Gastrointestinal: Negative for abdominal pain, nausea and vomiting.   Endocrine: Negative for polyphagia and polyuria.   Genitourinary: Negative for dysuria and hematuria.   Musculoskeletal: Negative for neck pain and neck stiffness.   Skin: Negative for rash and wound.   Allergic/Immunologic: Negative for food allergies and immunocompromised state.   Neurological: Negative for dizziness, syncope, weakness and light-headedness.   Hematological: Negative for adenopathy. Does not bruise/bleed easily.   Psychiatric/Behavioral: Positive for suicidal ideas. Negative for confusion.       Objective       Objective      Vital Signs  Temp:  [97.9 °F (36.6 °C)-98.2 °F (36.8 °C)] 97.9 °F (36.6 °C)  Heart Rate:  [] 100  Resp:  [18] 18  BP: (101-147)/(56-94) 147/94     Vital Signs (last 72 hrs)       06/12 0700  -  06/13 0659 06/13 0700  -  06/14 0659 06/14 0700  -   06/15 0659 06/15 0700  -  06/15 0847   Most Recent    Temp (°F)     97.9 -  98.2       97.9 (36.6)    Heart Rate     92 -  154       100    Resp       18       18    BP     101/76 -  147/94       147/94    SpO2 (%)     94 -  100       99        Body mass index is 54.71 kg/m².  Documented weights    06/15/21 0110 06/15/21 0556   Weight: (!) 194 kg (428 lb) (!) 173 kg (381 lb 4.8 oz)            Intake/Output Summary (Last 24 hours) at 6/15/2021 0847  Last data filed at 6/15/2021 0757  Gross per 24 hour   Intake --   Output 1375 ml   Net -1375 ml     Physical Exam  Constitutional:       General: He is not in acute distress.     Appearance: Normal appearance. He is well-developed.   HENT:      Head: Normocephalic and atraumatic.      Mouth/Throat:      Mouth: Mucous membranes are moist.   Eyes:      Extraocular Movements: Extraocular movements intact.      Pupils: Pupils are equal, round, and reactive to light.   Neck:      Vascular: No JVD.   Cardiovascular:      Rate and Rhythm: Normal rate. Rhythm irregular.      Pulses:           Dorsalis pedis pulses are 1+ on the right side and 1+ on the left side.        Posterior tibial pulses are 1+ on the right side and 1+ on the left side.      Heart sounds: Normal heart sounds. No murmur heard.   No S3 or S4 sounds.    Pulmonary:      Effort: Pulmonary effort is normal. No respiratory distress.      Breath sounds: Normal breath sounds. No wheezing.   Abdominal:      General: Bowel sounds are normal. There is no distension.      Palpations: Abdomen is soft. There is no hepatomegaly.      Tenderness: There is no abdominal tenderness.   Musculoskeletal:         General: Normal range of motion.      Cervical back: Normal range of motion and neck supple.      Right lower leg: Edema present.      Left lower leg: Edema present.   Skin:     General: Skin is warm and dry.      Coloration: Skin is not jaundiced or pale.      Findings: Erythema (bilateral lower extremities ) present.    Neurological:      General: No focal deficit present.      Mental Status: He is alert and oriented to person, place, and time. Mental status is at baseline.   Psychiatric:      Comments: Patient appears somewhat depressed but is in no acute distress.       Results review     Results Review:    I reviewed the patient's new clinical results.  Results from last 7 days   Lab Units 06/15/21  0324 06/15/21  0128   CK TOTAL U/L  --  61   TROPONIN T ng/mL <0.010 <0.010     Results from last 7 days   Lab Units 06/15/21  0128   WBC 10*3/mm3 6.98   HEMOGLOBIN g/dL 13.9   PLATELETS 10*3/mm3 248     Results from last 7 days   Lab Units 06/15/21  0128   SODIUM mmol/L 131*   POTASSIUM mmol/L 4.0   CHLORIDE mmol/L 94*   CO2 mmol/L 22.8   BUN mg/dL 7   CREATININE mg/dL 0.65*   CALCIUM mg/dL 8.8   GLUCOSE mg/dL 107*   ALT (SGPT) U/L 48*   AST (SGOT) U/L 88*     Lab Results   Component Value Date    INR 1.63 (H) 08/15/2017     Lab Results   Component Value Date    MG 1.5 (L) 06/15/2021     Lab Results   Component Value Date    TSH 0.559 06/15/2021      No results found for: PROBNP    ECG         ECG/EMG Results (last 24 hours)     Procedure Component Value Units Date/Time    ECG 12 Lead [[id number]] Collected: 06/15/21 0122     Updated: 06/15/21 0123            Imaging Results (Last 72 Hours)     Procedure Component Value Units Date/Time    XR Chest 1 View [[id number]] Collected: 06/15/21 0307     Updated: 06/15/21 0309    Narrative:      CR Chest 1 Vw    INDICATION:   Chest pain.     COMPARISON:    None available.    FINDINGS:  Single portable AP view(s) of the chest.  Heart is mildly enlarged. Pulmonary vascularity is normal. The lungs are clear. No pneumothorax is seen.      Impression:      No acute cardiopulmonary findings.    Signer Name: Junaid Ferguson MD   Signed: 6/15/2021 3:07 AM   Workstation Name: [facility]    [facility]          I have discussed my impression and recommendations with the patient  and family.    Thank you very much for asking us to be involved in this patient's care.  We will follow along with you.    Electronically signed by ALECIA Breen, 06/15/21, 8:47 AM EDT.    Electronically signed by Shashank Mendes MD, 06/15/21, 9:22 AM EDT.    Please note that portions of this note were completed with a voice recognition program.

## 2021-06-15 NOTE — PLAN OF CARE
Pt arrived from ER. Resting in bed at this time. Pt claims to no longer be suicidal, pt is tearful and short when answering any questions. Pt denies remembering the last time he used any drugs but stated he knows he used meth and cocaine. Suicide precautions taken, sitter and guard at bedside. Will continue to follow plan of care and monitor.

## 2021-06-15 NOTE — NURSING NOTE
Patient wife called unit to speak to patient, verified with current  if he could take the call and he stated he felt as though it would be ok.  Wife transferred to room.

## 2021-06-15 NOTE — CASE MANAGEMENT/SOCIAL WORK
Discharge Planning Assessment   Nino     Patient Name: Adriano Henry  MRN: [id number]  Today's Date: 6/15/2021    Admit Date: 6/15/2021    Discharge Needs Assessment     Row Name 06/15/21 1324       Transition Planning    Patient/Family Anticipates Transition to  correctional facility    Transportation Anticipated  other (see comments)       Discharge Needs Assessment    Discharge Facility/Level of Care Needs  correctional facility        Discharge Plan     Row Name 06/15/21 1321       Plan    Plan Pt is from [facility]. Pt has psych consult. Pt has guard from [facility]. Pt to be d/c back into custody of [facility]. No needs identified. SS to be reconsulted if needed.    Patient/Family in Agreement with Plan  yes        Demographic Summary     Row Name 06/15/21 0166       General Information    Referral Source  nursing    Reason for Consult  -- discharge        REG Catherine

## 2021-06-15 NOTE — CONSULTS
"Referring Provider: Dr. Fulton  Reason for Consultation: SI    Chief complaint/Focus of Exam: SI    Subjective .     History of present illness:    Patient is a 50-year-old male with a history of A. fib, COPD, hypertension, sleep apnea, seizures who was admitted to [facility] for evaluation of chest pain.  Concurrently with patient's chest pain, police were called to patient's home because he was holding a gun to his head threatening to \"end it all.\"  This also appears to be in the setting of intoxication with alcohol, amphetamine and is regularly prescribed opiates.  Psychiatry was consulted for SI concerns.    Patient has been emotional and remorseful about the situation since sobering up.  He denies any recent suicidality and reports that his stress and emotions got the better of him in the setting of intoxication yesterday.  He expresses regret and embarrassment about it.  He reports persistent depression and anxiety, largely related to his medical comorbidities and declining functional status.  Depressive symptoms include low mood, anhedonia, hopelessness, fatigue, low energy and low motivation.  He denies recent SI when sober.  He reports a desire to decrease substance abuse and get involved in outpatient mental health care.    Review of Systems  All systems were reviewed and negative except for:  Cardiovascular: positive for  chest pressure / pain, at rest    History  Past Medical History:   Diagnosis Date   • Arthritis    • Atrial fibrillation (CMS/HCC)    • COPD (chronic obstructive pulmonary disease) (CMS/HCC)    • Hypertension    • Oxygen dependent     AS NEEDED AT BEDTIME 2L   • Seizures (CMS/HCC)    ,   Past Surgical History:   Procedure Laterality Date   • ABDOMINAL SURGERY     • CHOLECYSTECTOMY     • GASTRIC BYPASS     • ANAND FILTER INSERTION JUGULAR     • INCISION AND DRAINAGE LEG Right 8/15/2017    Procedure: INCISION AND DRAINAGE ABSCESS;  Surgeon: Diana Sutherland MD;  Location: [facility]" OR;  Service:    • KNEE SURGERY     • WOUND EXPLORATION LEG Right 8/30/2017    Procedure: Exploration right leg wound;  Surgeon: Leo Ragland MD;  Location: [facility] OR;  Service:    ,   Family History   Problem Relation Age of Onset   • Hypertension Mother    • Hypertension Father    ,   Social History     Socioeconomic History   • Marital status:      Spouse name: Not on file   • Number of children: Not on file   • Years of education: Not on file   • Highest education level: Not on file   Tobacco Use   • Smoking status: Current Every Day Smoker     Packs/day: 1.00     Years: 10.00     Pack years: 10.00     Types: Cigarettes   Substance and Sexual Activity   • Alcohol use: Yes   • Drug use: No   • Sexual activity: Defer     E-cigarette/Vaping     E-cigarette/Vaping Substances     E-cigarette/Vaping Devices       ,   Medications Prior to Admission   Medication Sig Dispense Refill Last Dose   • albuterol (PROVENTIL HFA;VENTOLIN HFA) 108 (90 Base) MCG/ACT inhaler Inhale 2 puffs Every 6 (Six) Hours As Needed for Shortness of Air.   Unknown at Unknown time   • allopurinol (ZYLOPRIM) 300 MG tablet Take 300 mg by mouth Daily.   Unknown at Unknown time   • ARIPiprazole (ABILIFY) 10 MG tablet Take 10 mg by mouth Daily.   Unknown at Unknown time   • azelastine (ASTELIN) 0.1 % nasal spray 1 spray into the nostril(s) as directed by provider 2 (Two) Times a Day. Use in each nostril as directed   Unknown at Unknown time   • busPIRone (BUSPAR) 7.5 MG tablet Take 7.5 mg by mouth 2 (two) times a day.   Unknown at Unknown time   • CloNIDine (CATAPRES) 0.2 MG tablet Take 0.2 mg by mouth 2 (Two) Times a Day.   Unknown at Unknown time   • colchicine 0.6 MG tablet Take 0.6 mg by mouth 3 (Three) Times a Day As Needed for Muscle / Joint Pain (gout flare).   Unknown at Unknown time   • DULoxetine (CYMBALTA) 60 MG capsule Take 60 mg by mouth 2 (Two) Times a Day.   Unknown at Unknown time   • furosemide (LASIX) 40 MG tablet Take 40  mg by mouth Daily.   Unknown at Unknown time   • gabapentin (NEURONTIN) 400 MG capsule Take 400 mg by mouth 3 (Three) Times a Day.   Unknown at Unknown time   • levETIRAcetam (KEPPRA) 750 MG tablet Take 1,500 mg by mouth 2 (Two) Times a Day.   Unknown at Unknown time   • lisinopril (PRINIVIL,ZESTRIL) 20 MG tablet Take 20 mg by mouth 2 (two) times a day.   Unknown at Unknown time   • meloxicam (MOBIC) 15 MG tablet Take 15 mg by mouth Daily.   Unknown at Unknown time   • metFORMIN (GLUCOPHAGE) 500 MG tablet Take 500 mg by mouth Daily.   Unknown at Unknown time   • metoprolol succinate XL (TOPROL-XL) 100 MG 24 hr tablet Take 100 mg by mouth Daily.   Unknown at Unknown time   • multivitamin with minerals (DAILY-KINGA MENS FORMULA PO) Take 1 tablet by mouth Daily.   Unknown at Unknown time   • niacin (SLO-NIACIN) 500 MG CR tablet Take 500 mg by mouth Daily.   Unknown at Unknown time   • omeprazole (priLOSEC) 20 MG capsule Take 20 mg by mouth Daily.   Unknown at Unknown time   • oxyCODONE (ROXICODONE) 15 MG immediate release tablet Take 15 mg by mouth 4 (Four) Times a Day As Needed for Moderate Pain .   Unknown at Unknown time   • potassium chloride (K-DUR) 10 MEQ CR tablet Take 10 mEq by mouth Daily.   Unknown at Unknown time   • rivaroxaban (XARELTO) 20 MG tablet Take 20 mg by mouth every night at bedtime. AFIB   Unknown at Unknown time   • rosuvastatin (CRESTOR) 10 MG tablet Take 10 mg by mouth Every Night.   Unknown at Unknown time   • tamsulosin (FLOMAX) 0.4 MG capsule 24 hr capsule Take 1 capsule by mouth Daily.   Unknown at Unknown time   • thiamine (VITAMIN B-1) 100 MG tablet  tablet Take 100 mg by mouth 2 (two) times a day.   Unknown at Unknown time   • tiZANidine (ZANAFLEX) 4 MG tablet Take 4 mg by mouth 2 (Two) Times a Day As Needed for Muscle Spasms (muscle pain / tightness).   Unknown at Unknown time   • traZODone (DESYREL) 100 MG tablet Take 100 mg by mouth Every Night.   Unknown at Unknown time   , Scheduled  Meds:  allopurinol, 300 mg, Oral, Daily  ARIPiprazole, 10 mg, Oral, Daily  aspirin, 81 mg, Oral, Daily  azelastine, 1 spray, Each Nare, BID  busPIRone, 7.5 mg, Oral, Q12H  cloNIDine, 0.2 mg, Oral, Q12H  DULoxetine, 60 mg, Oral, BID  gabapentin, 400 mg, Oral, TID  levETIRAcetam, 1,500 mg, Oral, Q12H  lisinopril, 20 mg, Oral, Q12H  metoprolol succinate XL, 100 mg, Oral, Daily  multivitamin with minerals, 1 tablet, Oral, Daily  [START ON 6/16/2021] pantoprazole, 40 mg, Oral, QAM  rivaroxaban, 20 mg, Oral, Nightly  rosuvastatin, 10 mg, Oral, Nightly  sodium chloride, 3 mL, Intravenous, Q12H  tamsulosin, 0.4 mg, Oral, Daily  IV Fluids 1000 mL + additives, 100 mL/hr, Intravenous, Daily  traZODone, 100 mg, Oral, Nightly    , Continuous Infusions:  dilTIAZem, 5-15 mg/hr, Last Rate: 10 mg/hr (06/15/21 1031)    , PRN Meds:  •  albuterol  •  LORazepam **OR** LORazepam **OR** LORazepam **OR** LORazepam **OR** LORazepam **OR** LORazepam  •  magnesium sulfate **OR** magnesium sulfate **OR** magnesium sulfate  •  nitroglycerin  •  oxyCODONE  •  [COMPLETED] Insert peripheral IV **AND** sodium chloride  •  sodium chloride  •  tiZANidine and Allergies:  Patient has no known allergies.    Objective     Vital Signs   Temp:  [97.9 °F (36.6 °C)-98.2 °F (36.8 °C)] 97.9 °F (36.6 °C)  Heart Rate:  [] 100  Resp:  [18] 18  BP: (101-147)/(56-94) 147/94    Mental Status Exam:  Hygiene:   good  Cooperation:  Cooperative  Eye Contact:  Good  Psychomotor Behavior:  Appropriate  Affect:  Restricted  Hopelessness: 2  Speech:  Normal  Thought Progress:  Goal directed and Linear  Thought Content:  Normal and Mood congruent  Suicidal:  None  Homicidal:  None  Hallucinations:  None  Delusion:  None  Memory:  Intact  Orientation:  Person, Place, Time and Situation  Reliability:  fair  Insight:  Fair  Judgement:  Poor  Impulse Control:  Poor    Results Review:   I reviewed the patient's new clinical results.  I personally viewed and interpreted the  patient's EKG/Telemetry data  Lab Results (last 24 hours)     Procedure Component Value Units Date/Time    Hepatitis Panel, Acute [[id number]]  (Abnormal) Collected: 06/15/21 0908    Specimen: Blood Updated: 06/15/21 1005     Hepatitis B Surface Ag Non-Reactive     Hep A IgM Non-Reactive     Hep B C IgM Non-Reactive     Hepatitis C Ab Reactive    Narrative:      Results may be falsely decreased if patient taking Biotin.     Troponin [[id number]]  (Normal) Collected: 06/15/21 0908    Specimen: Blood Updated: 06/15/21 0933     Troponin T <0.010 ng/mL     Narrative:      Troponin T Reference Range:  <= 0.03 ng/mL-   Negative for AMI  >0.03 ng/mL-     Abnormal for myocardial necrosis.  Clinicians would have to utilize clinical acumen, EKG, Troponin and serial changes to determine if it is an Acute Myocardial Infarction or myocardial injury due to an underlying chronic condition.       Results may be falsely decreased if patient taking Biotin.      Ethanol [[id number]]  (Abnormal) Collected: 06/15/21 0324    Specimen: Blood Updated: 06/15/21 0526     Ethanol 91 mg/dL      Ethanol % 0.091 %     Narrative:      >/= 80.0 legally intoxicated    Troponin [[id number]]  (Normal) Collected: 06/15/21 0324    Specimen: Blood Updated: 06/15/21 0409     Troponin T <0.010 ng/mL     Narrative:      Troponin T Reference Range:  <= 0.03 ng/mL-   Negative for AMI  >0.03 ng/mL-     Abnormal for myocardial necrosis.  Clinicians would have to utilize clinical acumen, EKG, Troponin and serial changes to determine if it is an Acute Myocardial Infarction or myocardial injury due to an underlying chronic condition.       Results may be falsely decreased if patient taking Biotin.      COVID-19 and FLU A/B PCR - Swab, Nasopharynx [[id number]]  (Normal) Collected: 06/15/21 0324    Specimen: Swab from Nasopharynx Updated: 06/15/21 0352     COVID19 Not Detected     Influenza A PCR Not Detected     Influenza B PCR Not Detected    Narrative:      Fact  sheet for providers: https://www.fda.gov/media/887760/download    Fact sheet for patients: https://www.fda.gov/media/385033/download    Test performed by PCR.    Urinalysis, Microscopic Only - Urine, Clean Catch [[id number]]  (Abnormal) Collected: 06/15/21 0228    Specimen: Urine, Clean Catch Updated: 06/15/21 0306     RBC, UA None Seen /HPF      WBC, UA 0-2 /HPF      Bacteria, UA Trace /HPF      Squamous Epithelial Cells, UA 0-2 /HPF      Hyaline Casts, UA None Seen /LPF      Methodology Manual Light Microscopy    Urine Drug Screen - Urine, Clean Catch [[id number]]  (Abnormal) Collected: 06/15/21 0228    Specimen: Urine, Clean Catch Updated: 06/15/21 0304     Amphetamine Screen, Urine Positive     Barbiturates Screen, Urine Negative     Benzodiazepine Screen, Urine Negative     Cocaine Screen, Urine Negative     Methadone Screen, Urine Negative     Opiate Screen Negative     Phencyclidine (PCP), Urine Negative     THC, Screen, Urine Negative     6-ACETYL MORPHINE Negative     Buprenorphine, Screen, Urine Negative     Oxycodone Screen, Urine Positive    Narrative:      Negative Thresholds For Drugs Screened:                  Amphetamines              1000 ng/ml               Barbiturates               200 ng/ml               Benzodiazepines            200 ng/ml              Cocaine                    300 ng/ml              Methadone                  300 ng/ml              Opiates                    300 ng/ml               Phencyclidine               25 ng/ml               THC                         50 ng/ml              6-Acetyl Morphine           10 ng/ml              Buprenorphine                5 ng/ml              Oxycodone                  300 ng/ml    The reference range for all drugs tested is negative. This report includes final unconfirmed qualitative results to be used for medical treatment purposes only. Unconfirmed results must not be used for non-medical purposes such as employment or legal testing.  Clinical consideration should be applied to any drug of abuse test, especially when unconfirmed quantitative results are used.        Urinalysis With Microscopic If Indicated (No Culture) - Urine, Clean Catch [[id number]]  (Abnormal) Collected: 06/15/21 0228    Specimen: Urine, Clean Catch Updated: 06/15/21 0238     Color, UA Yellow     Appearance, UA Clear     pH, UA 5.5     Specific Gravity, UA <=1.005     Glucose, UA Negative     Ketones, UA Negative     Bilirubin, UA Negative     Blood, UA Negative     Protein, UA Negative     Leuk Esterase, UA Trace     Nitrite, UA Negative     Urobilinogen, UA 0.2 E.U./dL    Troponin [[id number]]  (Normal) Collected: 06/15/21 0128    Specimen: Blood from Arm, Right Updated: 06/15/21 0202     Troponin T <0.010 ng/mL     Narrative:      Troponin T Reference Range:  <= 0.03 ng/mL-   Negative for AMI  >0.03 ng/mL-     Abnormal for myocardial necrosis.  Clinicians would have to utilize clinical acumen, EKG, Troponin and serial changes to determine if it is an Acute Myocardial Infarction or myocardial injury due to an underlying chronic condition.       Results may be falsely decreased if patient taking Biotin.      TSH [[id number]]  (Normal) Collected: 06/15/21 0128    Specimen: Blood from Arm, Right Updated: 06/15/21 0202     TSH 0.559 uIU/mL     Comprehensive Metabolic Panel [[id number]]  (Abnormal) Collected: 06/15/21 0128    Specimen: Blood from Arm, Right Updated: 06/15/21 0158     Glucose 107 mg/dL      BUN 7 mg/dL      Creatinine 0.65 mg/dL      Sodium 131 mmol/L      Potassium 4.0 mmol/L      Comment: Slight hemolysis detected by analyzer. Results may be affected.        Chloride 94 mmol/L      CO2 22.8 mmol/L      Calcium 8.8 mg/dL      Total Protein 7.7 g/dL      Albumin 3.74 g/dL      ALT (SGPT) 48 U/L      AST (SGOT) 88 U/L      Alkaline Phosphatase 118 U/L      Total Bilirubin 0.4 mg/dL      eGFR Non African Amer 130 mL/min/1.73      Globulin 4.0 gm/dL      A/G Ratio 0.9  g/dL      BUN/Creatinine Ratio 10.8     Anion Gap 14.2 mmol/L     Narrative:      GFR Normal >60  Chronic Kidney Disease <60  Kidney Failure <15      Ethanol [[id number]]  (Abnormal) Collected: 06/15/21 0128    Specimen: Blood from Arm, Right Updated: 06/15/21 0158     Ethanol 127 mg/dL      Ethanol % 0.127 %     Narrative:      >/= 80.0 legally intoxicated    CK [[id number]]  (Normal) Collected: 06/15/21 0128    Specimen: Blood from Arm, Right Updated: 06/15/21 0158     Creatine Kinase 61 U/L     C-reactive Protein [[id number]]  (Normal) Collected: 06/15/21 0128    Specimen: Blood from Arm, Right Updated: 06/15/21 0158     C-Reactive Protein <0.30 mg/dL     Acetaminophen Level [[id number]]  (Normal) Collected: 06/15/21 0128    Specimen: Blood from Arm, Right Updated: 06/15/21 0158     Acetaminophen <5.0 mcg/mL     Magnesium [[id number]]  (Abnormal) Collected: 06/15/21 0128    Specimen: Blood from Arm, Right Updated: 06/15/21 0158     Magnesium 1.5 mg/dL     CBC & Differential [[id number]]  (Abnormal) Collected: 06/15/21 0128    Specimen: Blood from Arm, Right Updated: 06/15/21 0135    Narrative:      The following orders were created for panel order CBC & Differential.  Procedure                               Abnormality         Status                     ---------                               -----------         ------                     CBC Auto Differential[[id number]]        Abnormal            Final result                 Please view results for these tests on the individual orders.    CBC Auto Differential [[id number]]  (Abnormal) Collected: 06/15/21 0128    Specimen: Blood from Arm, Right Updated: 06/15/21 0135     WBC 6.98 10*3/mm3      RBC 4.52 10*6/mm3      Hemoglobin 13.9 g/dL      Hematocrit 42.8 %      MCV 94.7 fL      MCH 30.8 pg      MCHC 32.5 g/dL      RDW 14.9 %      RDW-SD 52.3 fl      MPV 9.9 fL      Platelets 248 10*3/mm3      Neutrophil % 71.3 %      Lymphocyte % 19.2 %      Monocyte % 5.2 %       Eosinophil % 3.6 %      Basophil % 0.4 %      Immature Grans % 0.3 %      Neutrophils, Absolute 4.98 10*3/mm3      Lymphocytes, Absolute 1.34 10*3/mm3      Monocytes, Absolute 0.36 10*3/mm3      Eosinophils, Absolute 0.25 10*3/mm3      Basophils, Absolute 0.03 10*3/mm3      Immature Grans, Absolute 0.02 10*3/mm3      nRBC 0.0 /100 WBC         Imaging Results (Last 24 Hours)     Procedure Component Value Units Date/Time    XR Chest 1 View [[id number]] Collected: 06/15/21 0307     Updated: 06/15/21 0309    Narrative:      CR Chest 1 Vw    INDICATION:   Chest pain.     COMPARISON:    None available.    FINDINGS:  Single portable AP view(s) of the chest.  Heart is mildly enlarged. Pulmonary vascularity is normal. The lungs are clear. No pneumothorax is seen.      Impression:      No acute cardiopulmonary findings.    Signer Name: Junaid Ferguson MD   Signed: 6/15/2021 3:07 AM   Workstation Name: [facility]    [facility]            Assessment/Plan     Active Problems:    Afib (CMS/[facility])     Suicidal ideation  -Expressed during intoxication.  Retracted once sober  -Denies SI otherwise recently, remorseful about the situation  -Okay to discontinue sitter  -Patient appears remorseful and stable.  He does not desire inpatient psychiatric care at this time and is not considered holdable as he has been safe and stable, denying SI since his admission    Major depressive disorder, severe, recurrent, without psychosis  -Continue Cymbalta 60 mg daily  -Continue aripiprazole 10 mg daily  -Continue buspirone 7.5 mg twice daily  -Establish outpatient psychiatric follow-up    I discussed the patients findings and my recommendations with patient and nursing staff    Shin Cade MD  06/15/21  14:55 EDT

## 2021-06-15 NOTE — PLAN OF CARE
Goal Outcome Evaluation:           Patient resting in bed with no complaints, Patient remains with 1:1 sitter, and on Cardizem gtt.  Patient back from stress lab and denies chest pain at this time. Awaiting psych eval. Will continue to monitor.

## 2021-06-15 NOTE — ED NOTES
All patients belongings given to NANCY Cuellar on IP floor.      Kanika Barnes RN  06/15/21 0559

## 2021-06-15 NOTE — H&P
"    [facility] Medicine Services  History & Physical    Patient Identification:  Name:  Adriano Henry  Age:  50 y.o.  Sex:  male  :  1970  MRN:  [id number]   Visit Number:  [id number]  Admit Date: 6/15/2021   Primary Care Physician:  Radames Melo APRN    Subjective     Chief complaint: chest pain     History of presenting illness:      Adriano Henry is a 50 y.o. male with past medical history significant for atrial fibrillation, COPD, hypertension, nocturnal hypoxia, seizures.  Mr. Henry presented to the emergency department at [facility] last night with chief complaint of chest pain.  This pain reportedly began 1 hour prior to arrival.  He did prior to this have a very eventful evening when his family called the police because he was reportedly holding a gun to his head and threatening to \"end it all\".  He reported he was having a bad day and felt at the time he was doing the best thing for his family.  He reported that prior to any of those things he was having chest pain.  Apparently when the police arrived they did ask him to put the gun down and he did refuse.  Due to refusal a taser was used on the patient.  He reports he did have some chest pain after being tased but most of the chest pain had started prior to this.  He was tachycardic upon arrival with EKG revealing atrial fibrillation with RVR      Upon arrival to the ED, vital signs were T98.2F, pulse 126-150s, RR 18, and /97 with oxygen saturation of 100% on 2LPM via NC.   Magnesium was low at 1.5.   Sodium was 131.  ALT 48, AST 88.  TSH 0.559.   Ethanol positive. UDS amphetamine positive.      Mr. Henry is resting in bed. He is very kind. He reports he drank some alcohol yesterday and had a very stressful day.  He reports he felt as if he should end his life and held a gun to his head.  He reports at some point the police were called and tazed him. He reports sometime after he started " drinking alcohol.  He reports the chest pain was midsternal with associate diaphoresis.  He reports it did persist after being tazed.  He reports compliance with his home medications and tells me he is on Xarelto. He reports he does not currently have a primary cardiologist and gets Xarelto from his PCP.  He reports he is unsure when his last ischemic work-up may have been.  He denies vomiting. He denies diarrhea.      In regard to alcohol, he reports he only drinks periodically.  In regard to illicit drug use, he tells me he is unaware of using any drugs.  At the time of evaluation, local authorities are at bedside as well.  There is also a suicide sitter at bedside.        ---------------------------------------------------------------------------------------------------------------------   Review of Systems   Constitutional: Positive for diaphoresis and fatigue. Negative for activity change and chills.   HENT: Negative for congestion and drooling.    Eyes: Negative for pain and discharge.   Respiratory: Positive for shortness of breath. Negative for cough and wheezing.    Cardiovascular: Positive for chest pain. Negative for leg swelling.   Gastrointestinal: Negative for abdominal distention, constipation, diarrhea and vomiting.   Endocrine: Negative for cold intolerance and heat intolerance.   Genitourinary: Negative for difficulty urinating and dysuria.   Musculoskeletal: Negative for arthralgias and gait problem.   Skin: Negative for color change and rash.   Allergic/Immunologic: Negative for environmental allergies and food allergies.   Neurological: Negative for dizziness and headaches.   Hematological: Negative for adenopathy. Does not bruise/bleed easily.   Psychiatric/Behavioral: Negative for agitation and confusion.        ---------------------------------------------------------------------------------------------------------------------   Past Medical History:   Diagnosis Date   • Arthritis    • Atrial  fibrillation (CMS/HCC)    • COPD (chronic obstructive pulmonary disease) (CMS/HCC)    • Hypertension    • Oxygen dependent     AS NEEDED AT BEDTIME 2L   • Seizures (CMS/HCC)      Past Surgical History:   Procedure Laterality Date   • ABDOMINAL SURGERY     • CHOLECYSTECTOMY     • GASTRIC BYPASS     • ANAND FILTER INSERTION JUGULAR     • INCISION AND DRAINAGE LEG Right 8/15/2017    Procedure: INCISION AND DRAINAGE ABSCESS;  Surgeon: Diana Sutherland MD;  Location: [facility];  Service:    • KNEE SURGERY     • WOUND EXPLORATION LEG Right 8/30/2017    Procedure: Exploration right leg wound;  Surgeon: Leo Ragland MD;  Location: [facility];  Service:      Family History   Problem Relation Age of Onset   • Hypertension Mother    • Hypertension Father      Social History     Socioeconomic History   • Marital status:      Spouse name: Not on file   • Number of children: Not on file   • Years of education: Not on file   • Highest education level: Not on file   Tobacco Use   • Smoking status: Current Every Day Smoker     Packs/day: 1.00     Years: 10.00     Pack years: 10.00     Types: Cigarettes   Substance and Sexual Activity   • Alcohol use: Yes   • Drug use: No   • Sexual activity: Defer     ---------------------------------------------------------------------------------------------------------------------   Allergies:  Patient has no known allergies.  ---------------------------------------------------------------------------------------------------------------------   Home medications:    Medications below are reported home medications pulling from within the system; at this time, these medications have not been reconciled unless otherwise specified and are in the verification process for further verifcation as current home medications.  Medications Prior to Admission   Medication Sig Dispense Refill Last Dose   • albuterol (PROVENTIL HFA;VENTOLIN HFA) 108 (90 Base) MCG/ACT inhaler Inhale 2 puffs Every 6  (Six) Hours As Needed for Wheezing.      • albuterol (PROVENTIL) (2.5 MG/3ML) 0.083% nebulizer solution Take 2.5 mg by nebulization Every 6 (Six) Hours As Needed for Wheezing.      • allopurinol (ZYLOPRIM) 300 MG tablet Take 300 mg by mouth Daily.      • ARIPiprazole (ABILIFY) 5 MG tablet Take 5 mg by mouth Daily.      • budesonide-formoterol (SYMBICORT) 160-4.5 MCG/ACT inhaler Inhale 2 puffs 2 (Two) Times a Day.      • CloNIDine (CATAPRES) 0.2 MG tablet Take 0.2 mg by mouth 2 (Two) Times a Day.      • colchicine 0.6 MG tablet Take 1 tablet by mouth 3 (Three) Times a Day As Needed for Muscle / Joint Pain.      • DULoxetine (CYMBALTA) 60 MG capsule Take 60 mg by mouth Daily.      • furosemide (LASIX) 40 MG tablet Take 80 mg by mouth Daily.      • gabapentin (NEURONTIN) 800 MG tablet Take 800 mg by mouth 3 (Three) Times a Day.      • HYDROcodone-acetaminophen (NORCO) 7.5-325 MG per tablet Take 1 tablet by mouth Every 6 (Six) Hours As Needed for Moderate Pain . 6 tablet 0    • levETIRAcetam (KEPPRA) 750 MG tablet Take 1,500 mg by mouth 2 (Two) Times a Day.      • lisinopril (PRINIVIL,ZESTRIL) 10 MG tablet Take 1 tablet by mouth Daily. 30 tablet 2    • meloxicam (MOBIC) 7.5 MG tablet Take 7.5 mg by mouth Daily.      • metoprolol tartrate (LOPRESSOR) 25 MG tablet Take 1 tablet by mouth Every 12 (Twelve) Hours. 60 tablet 2    • niacin (NIACIN SR,NIASPAN ER) 500 MG CR capsule Take 500 mg by mouth Every Night.      • omeprazole (priLOSEC) 20 MG capsule Take 20 mg by mouth 2 (Two) Times a Day.      • oxyCODONE (ROXICODONE) 20 MG tablet Take 20 mg by mouth Every 6 (Six) Hours As Needed for Moderate Pain .      • potassium chloride (K-DUR) 10 MEQ CR tablet Take 20 mEq by mouth Daily With Breakfast.      • rivaroxaban (XARELTO) 20 MG tablet Take 1 tablet by mouth Daily With Dinner. 30 tablet 2    • rosuvastatin (CRESTOR) 20 MG tablet Take 20 mg by mouth Daily.      • sodium hypochlorite (DAKIN'S) 0.2-0.25 % topical solution  Apply daily to affected area. 473 mL 1    • traZODone (DESYREL) 100 MG tablet Take 100 mg by mouth Every Night.      • Umeclidinium-Vilanterol (ANORO ELLIPTA) 62.5-25 MCG/INH aerosol powder  Inhale 1 puff Daily.          Hospital Scheduled Meds:  aspirin, 81 mg, Oral, Daily  atorvastatin, 40 mg, Oral, Nightly  magnesium sulfate, 2 g, Intravenous, Q2H  pantoprazole, 40 mg, Oral, Q AM  sodium chloride, 3 mL, Intravenous, Q12H  IV Fluids 1000 mL + additives, 100 mL/hr, Intravenous, Daily      dilTIAZem, 5-15 mg/hr, Last Rate: 10 mg/hr (06/15/21 0407)        Current listed hospital scheduled medications may not yet reflect those currently placed in orders that are signed and held awaiting patient's arrival to floor.   ---------------------------------------------------------------------------------------------------------------------     Objective     Vital Signs:  Temp:  [97.9 °F (36.6 °C)-98.2 °F (36.8 °C)] 97.9 °F (36.6 °C)  Heart Rate:  [] 100  Resp:  [18] 18  BP: (101-147)/(56-94) 147/94      06/15/21  0110 06/15/21  0556   Weight: (!) 194 kg (428 lb) (!) 173 kg (381 lb 4.8 oz)     Body mass index is 54.71 kg/m².  ---------------------------------------------------------------------------------------------------------------------       Physical Exam  Vitals and nursing note reviewed.   Constitutional:       General: He is awake.      Appearance: Normal appearance. He is well-developed.   HENT:      Head: Normocephalic and atraumatic.      Mouth/Throat:      Lips: Pink.      Mouth: Mucous membranes are moist.   Eyes:      General: Lids are normal.      Conjunctiva/sclera:      Right eye: Right conjunctiva is not injected.      Left eye: Left conjunctiva is not injected.   Cardiovascular:      Rate and Rhythm: Rhythm irregularly irregular.   Pulmonary:      Effort: No tachypnea or bradypnea.      Breath sounds: No decreased breath sounds, wheezing, rhonchi or rales.   Abdominal:      General: Bowel sounds are  normal.      Palpations: Abdomen is soft.      Tenderness: There is no abdominal tenderness.   Musculoskeletal:      Right upper leg: No edema.      Left upper leg: No edema.      Comments: Changes of venous stasis bilaterally    Skin:     General: Skin is warm and dry.             Comments: Bilateral lower extremity changes of venous stasis   Neurological:      Mental Status: He is alert and oriented to person, place, and time.   Psychiatric:         Attention and Perception: Attention normal.         Mood and Affect: Mood normal. Mood is not anxious.         Behavior: Behavior is cooperative.      Comments: He reports he is no longer suicidal.                ---------------------------------------------------------------------------------------------------------------------  EKG:                    I have personally looked at both the EKG and the telemetry strips.  ---------------------------------------------------------------------------------------------------------------------   Results from last 7 days   Lab Units 06/15/21  0128   CRP mg/dL <0.30   WBC 10*3/mm3 6.98   HEMOGLOBIN g/dL 13.9   HEMATOCRIT % 42.8   MCV fL 94.7   MCHC g/dL 32.5   PLATELETS 10*3/mm3 248         Results from last 7 days   Lab Units 06/15/21  0128   SODIUM mmol/L 131*   POTASSIUM mmol/L 4.0   MAGNESIUM mg/dL 1.5*   CHLORIDE mmol/L 94*   CO2 mmol/L 22.8   BUN mg/dL 7   CREATININE mg/dL 0.65*   EGFR IF NONAFRICN AM mL/min/1.73 130   CALCIUM mg/dL 8.8   GLUCOSE mg/dL 107*   ALBUMIN g/dL 3.74   BILIRUBIN mg/dL 0.4   ALK PHOS U/L 118*   AST (SGOT) U/L 88*   ALT (SGPT) U/L 48*   Estimated Creatinine Clearance: 214.9 mL/min (A) (by C-G formula based on SCr of 0.65 mg/dL (L)).  No results found for: AMMONIA  Results from last 7 days   Lab Units 06/15/21  0324 06/15/21  0128   CK TOTAL U/L  --  61   TROPONIN T ng/mL <0.010 <0.010         No results found for: HGBA1C  Lab Results   Component Value Date    TSH 0.559 06/15/2021     No results  found for: PREGTESTUR, PREGSERUM, HCG, HCGQUANT  Pain Management Panel     Pain Management Panel Latest Ref Rng & Units 6/15/2021    AMPHETAMINES SCREEN, URINE Negative Positive(A)    BARBITURATES SCREEN Negative Negative    BENZODIAZEPINE SCREEN, URINE Negative Negative    BUPRENORPHINEUR Negative Negative    COCAINE SCREEN, URINE Negative Negative    METHADONE SCREEN, URINE Negative Negative        No results found for: BLOODCX  No results found for: URINECX  No results found for: WOUNDCX  No results found for: STOOLCX      ---------------------------------------------------------------------------------------------------------------------  Imaging Results (Last 7 Days)     Procedure Component Value Units Date/Time    XR Chest 1 View [[id number]] Collected: 06/15/21 0307     Updated: 06/15/21 0309    Narrative:      CR Chest 1 Vw    INDICATION:   Chest pain.     COMPARISON:    None available.    FINDINGS:  Single portable AP view(s) of the chest.  Heart is mildly enlarged. Pulmonary vascularity is normal. The lungs are clear. No pneumothorax is seen.      Impression:      No acute cardiopulmonary findings.    Signer Name: Junaid Ferguson MD   Signed: 6/15/2021 3:07 AM   Workstation Name: [email or web address]    [facility]          Cultures:  No results found for: BLOODCX, URINECX, WOUNDCX, MRSACX, RESPCX, STOOLCX    Last echocardiogram:  Results for orders placed during the hospital encounter of 08/15/17    Adult Transthoracic Echo Complete    Interpretation Summary  · Left ventricular systolic function is normal. Estimated EF= 55-60%  · All left ventricular wall segments contract normally  · Left atrial cavity size is mildly dilated.  · The aortic valve is structurally normal. No aortic valve regurgitation is present. No aortic valve stenosis is present  · Mild mitral valve regurgitation is present  · Mild tricuspid valve regurgitation is present.  · Mild pulmonary hypertension is present  · There  is no evidence of pericardial effusion        CHADS-VASc Risk Assessment            1 Total Score    1 Hypertension        Criteria that do not apply:    CHF    Age >/= 75    DM    PRIOR STROKE/TIA/THROMBO    Vascular Disease    Age 65-74    Sex: Female              I have personally reviewed the above radiology images and read the final radiology report on 06/15/21  ---------------------------------------------------------------------------------------------------------------------  Assessment / Plan     Active Hospital Problems    Diagnosis  POA   • Afib (CMS/[facility], located within [facility]) [I48.91]  Yes       ASSESSMENT/PLAN:  -Atrial fibrillation with RVR with known history:  -Event of tazing (prior known history of Afib):   -Hypomagnesemia:  • TSH WNL.   • Continuous cardiac monitoring.   • Continue diltiazem gtt.    • Continue home Xarelto with [id number] at least 1 (HTN).   • ETOH abuse likely impacting atrial fibrillation with RVR.   • Amphetamine components on UDS likely impacting atrial fibrillation with RVR.   • Echocardiogram added.   • Correct magnesium via electrolyte replacement protocol; Add AM Mag.   • Consult cardiology for further evaluation.     -Chest pain r/o MI:   -Essential HTN:   · Continuous cardiac monitoring.   · Vitals per hospital protocol.   · Add aspirin 81mg (Add PPI given hx of gastric bypass procedure).   · Add atorvastatin 40mg.   · Echocardiogram has been ordered.   · Consult cardiology for further evaluation.   · TSH WNL.   · Check hemoglobin a1c.   · Check lipid panel.     -Suicidal ideations/attempt:   -Depression:   · Continue SI sitter.   · Consult Psychiatry.   · Review home medication regimen for depression.   · Eval compliance.     -ETOH abuse:   -Mild transaminitis:   -Mild hyponatremia:   · Likely 2/2 ETOH use.   · CIWA protocol.   · Non-monitored unit protocol.   · Add hepatitis panel.   · Follow LFTs.       -Morbid obesity with BMI 54.71 kg/m2:   · History of gastric bypass procedure.   · Add  Protonix 40mg.   · Supportive care.   · Check hemoglobin a1c.     -Nocturnal hypoxia:   · Continuous pulse oximetry.     -Seizure disorder hx:   · Review current home medication regimen.   · Seizure precautions in place.         -Documentation of Jugular Gabbie Filter placement:   · Reports this was placed prophylactically around 20 years ago after gastric bypass.      ----------  -DVT prophylaxis: Xarelto to serve  -GI prophylaxis: Protonix 40mg added.   -Activity: Up with assist  -Expected length of stay:   INPATIENT status due to the need for care which can only be reasonably provided in an hospital setting such as aggressive/expedited ancillary services and/or consultation services, the necessity for IV medications, close physician monitoring and/or the possible need for procedures.  In such, I feel patient’s risk for adverse outcomes and need for care warrant INPATIENT evaluation and predict the patient’s care encounter to likely last beyond 2 midnights.  -Disposition: Plans to return home at discharge.     High risk secondary to atrial fibrillation with RVR, morbid obesity, seizure, morbid obesity, chest pain, suicidal ideations/attempt        Sandra Hill PA-C  06/15/21  09:31 EDT  Pager # [phone]  ---------------------------------------------------------------------------------------------------------------------

## 2021-06-16 LAB
ANION GAP SERPL CALCULATED.3IONS-SCNC: 5.7 MMOL/L (ref 5–15)
BASOPHILS # BLD AUTO: 0.03 10*3/MM3 (ref 0–0.2)
BASOPHILS NFR BLD AUTO: 0.4 % (ref 0–1.5)
BUN SERPL-MCNC: 6 MG/DL (ref 6–20)
BUN/CREAT SERPL: 10.9 (ref 7–25)
CALCIUM SPEC-SCNC: 8.9 MG/DL (ref 8.6–10.5)
CHLORIDE SERPL-SCNC: 106 MMOL/L (ref 98–107)
CO2 SERPL-SCNC: 26.3 MMOL/L (ref 22–29)
CREAT SERPL-MCNC: 0.55 MG/DL (ref 0.76–1.27)
DEPRECATED RDW RBC AUTO: 54.1 FL (ref 37–54)
EOSINOPHIL # BLD AUTO: 0.24 10*3/MM3 (ref 0–0.4)
EOSINOPHIL NFR BLD AUTO: 3.4 % (ref 0.3–6.2)
ERYTHROCYTE [DISTWIDTH] IN BLOOD BY AUTOMATED COUNT: 15.1 % (ref 12.3–15.4)
GFR SERPL CREATININE-BSD FRML MDRD: >150 ML/MIN/1.73
GLUCOSE BLDC GLUCOMTR-MCNC: 96 MG/DL (ref 70–130)
GLUCOSE SERPL-MCNC: 123 MG/DL (ref 65–99)
HCT VFR BLD AUTO: 42.5 % (ref 37.5–51)
HGB BLD-MCNC: 13.6 G/DL (ref 13–17.7)
IMM GRANULOCYTES # BLD AUTO: 0.02 10*3/MM3 (ref 0–0.05)
IMM GRANULOCYTES NFR BLD AUTO: 0.3 % (ref 0–0.5)
LYMPHOCYTES # BLD AUTO: 1.21 10*3/MM3 (ref 0.7–3.1)
LYMPHOCYTES NFR BLD AUTO: 17.2 % (ref 19.6–45.3)
MAGNESIUM SERPL-MCNC: 1.9 MG/DL (ref 1.6–2.6)
MCH RBC QN AUTO: 30.8 PG (ref 26.6–33)
MCHC RBC AUTO-ENTMCNC: 32 G/DL (ref 31.5–35.7)
MCV RBC AUTO: 96.2 FL (ref 79–97)
MONOCYTES # BLD AUTO: 0.65 10*3/MM3 (ref 0.1–0.9)
MONOCYTES NFR BLD AUTO: 9.2 % (ref 5–12)
NEUTROPHILS NFR BLD AUTO: 4.89 10*3/MM3 (ref 1.7–7)
NEUTROPHILS NFR BLD AUTO: 69.5 % (ref 42.7–76)
NRBC BLD AUTO-RTO: 0 /100 WBC (ref 0–0.2)
PLATELET # BLD AUTO: 228 10*3/MM3 (ref 140–450)
PMV BLD AUTO: 10.3 FL (ref 6–12)
POTASSIUM SERPL-SCNC: 4.6 MMOL/L (ref 3.5–5.2)
QT INTERVAL: 480 MS
QT INTERVAL: 500 MS
QTC INTERVAL: 507 MS
QTC INTERVAL: 518 MS
RBC # BLD AUTO: 4.42 10*6/MM3 (ref 4.14–5.8)
SODIUM SERPL-SCNC: 138 MMOL/L (ref 136–145)
WBC # BLD AUTO: 7.04 10*3/MM3 (ref 3.4–10.8)

## 2021-06-16 PROCEDURE — 93005 ELECTROCARDIOGRAM TRACING: CPT | Performed by: INTERNAL MEDICINE

## 2021-06-16 PROCEDURE — 25010000003 MAGNESIUM SULFATE 4 GM/100ML SOLUTION: Performed by: INTERNAL MEDICINE

## 2021-06-16 PROCEDURE — 80048 BASIC METABOLIC PNL TOTAL CA: CPT | Performed by: PHYSICIAN ASSISTANT

## 2021-06-16 PROCEDURE — 94799 UNLISTED PULMONARY SVC/PX: CPT

## 2021-06-16 PROCEDURE — 82962 GLUCOSE BLOOD TEST: CPT

## 2021-06-16 PROCEDURE — 85025 COMPLETE CBC W/AUTO DIFF WBC: CPT | Performed by: PHYSICIAN ASSISTANT

## 2021-06-16 PROCEDURE — 25010000002 THIAMINE PER 100 MG: Performed by: INTERNAL MEDICINE

## 2021-06-16 PROCEDURE — 99233 SBSQ HOSP IP/OBS HIGH 50: CPT | Performed by: INTERNAL MEDICINE

## 2021-06-16 PROCEDURE — 83735 ASSAY OF MAGNESIUM: CPT | Performed by: PHYSICIAN ASSISTANT

## 2021-06-16 PROCEDURE — 99232 SBSQ HOSP IP/OBS MODERATE 35: CPT | Performed by: INTERNAL MEDICINE

## 2021-06-16 RX ORDER — VALSARTAN 160 MG/1
160 TABLET ORAL
Status: DISCONTINUED | OUTPATIENT
Start: 2021-06-16 | End: 2021-06-19

## 2021-06-16 RX ORDER — MAGNESIUM SULFATE HEPTAHYDRATE 40 MG/ML
4 INJECTION, SOLUTION INTRAVENOUS ONCE
Status: COMPLETED | OUTPATIENT
Start: 2021-06-16 | End: 2021-06-16

## 2021-06-16 RX ADMIN — SODIUM CHLORIDE, PRESERVATIVE FREE 3 ML: 5 INJECTION INTRAVENOUS at 20:37

## 2021-06-16 RX ADMIN — RIVAROXABAN 20 MG: 20 TABLET, FILM COATED ORAL at 20:35

## 2021-06-16 RX ADMIN — TAMSULOSIN HYDROCHLORIDE 0.4 MG: 0.4 CAPSULE ORAL at 09:38

## 2021-06-16 RX ADMIN — PANTOPRAZOLE SODIUM 40 MG: 40 TABLET, DELAYED RELEASE ORAL at 06:48

## 2021-06-16 RX ADMIN — GABAPENTIN 400 MG: 400 CAPSULE ORAL at 20:34

## 2021-06-16 RX ADMIN — MAGNESIUM SULFATE HEPTAHYDRATE 4 G: 40 INJECTION, SOLUTION INTRAVENOUS at 10:12

## 2021-06-16 RX ADMIN — VALSARTAN 160 MG: 160 TABLET, FILM COATED ORAL at 16:34

## 2021-06-16 RX ADMIN — ARIPIPRAZOLE 10 MG: 10 TABLET ORAL at 09:38

## 2021-06-16 RX ADMIN — THIAMINE HYDROCHLORIDE 200 MG: 100 INJECTION, SOLUTION INTRAMUSCULAR; INTRAVENOUS at 13:21

## 2021-06-16 RX ADMIN — Medication 1 TABLET: at 09:38

## 2021-06-16 RX ADMIN — ASPIRIN 81 MG: 81 TABLET, COATED ORAL at 09:37

## 2021-06-16 RX ADMIN — LISINOPRIL 20 MG: 10 TABLET ORAL at 09:37

## 2021-06-16 RX ADMIN — GABAPENTIN 400 MG: 400 CAPSULE ORAL at 16:39

## 2021-06-16 RX ADMIN — METOPROLOL SUCCINATE 100 MG: 50 TABLET, EXTENDED RELEASE ORAL at 09:38

## 2021-06-16 RX ADMIN — OXYCODONE HYDROCHLORIDE 15 MG: 15 TABLET ORAL at 10:49

## 2021-06-16 RX ADMIN — CLONIDINE HYDROCHLORIDE 0.2 MG: 0.2 TABLET ORAL at 09:37

## 2021-06-16 RX ADMIN — DULOXETINE HYDROCHLORIDE 60 MG: 60 CAPSULE, DELAYED RELEASE ORAL at 20:36

## 2021-06-16 RX ADMIN — LEVETIRACETAM 1500 MG: 500 TABLET, FILM COATED ORAL at 20:35

## 2021-06-16 RX ADMIN — LEVETIRACETAM 1500 MG: 500 TABLET, FILM COATED ORAL at 09:38

## 2021-06-16 RX ADMIN — ALLOPURINOL 300 MG: 300 TABLET ORAL at 09:38

## 2021-06-16 RX ADMIN — GABAPENTIN 400 MG: 400 CAPSULE ORAL at 09:37

## 2021-06-16 RX ADMIN — TRAZODONE HYDROCHLORIDE 100 MG: 50 TABLET ORAL at 20:36

## 2021-06-16 RX ADMIN — BUSPIRONE HYDROCHLORIDE 7.5 MG: 10 TABLET ORAL at 09:38

## 2021-06-16 RX ADMIN — DULOXETINE HYDROCHLORIDE 60 MG: 60 CAPSULE, DELAYED RELEASE ORAL at 09:38

## 2021-06-16 RX ADMIN — BUSPIRONE HYDROCHLORIDE 7.5 MG: 10 TABLET ORAL at 20:34

## 2021-06-16 RX ADMIN — SODIUM CHLORIDE, PRESERVATIVE FREE 3 ML: 5 INJECTION INTRAVENOUS at 09:42

## 2021-06-16 RX ADMIN — OXYCODONE HYDROCHLORIDE 15 MG: 15 TABLET ORAL at 04:31

## 2021-06-16 NOTE — PROGRESS NOTES
[facility] HOSPITALIST PROGRESS NOTE     Patient Identification:  Name:  Adriano Henry  Age:  50 y.o.  Sex:  male  :  1970  MRN:  [id number]  Visit Number:  [id number]  ROOM: [address] [place] [address]     Primary Care Provider:  Radames Melo APRN    Length of stay in inpatient status:  1    Subjective     Chief Compliant:    Chief Complaint   Patient presents with   • Chest Pain   • Shortness of Breath   • Suicidal       History of Presenting Illness:    Patient notes feeling well without new complaints. Tele has revealed multiple pauses today up to 2.3 seconds and one pause of 3.9 seconds. Patient asymptomatic during periods.     ROS:  Otherwise 10 point ROS negative other than documented above in HPI.     Objective     Current Hospital Meds:allopurinol, 300 mg, Oral, Daily  ARIPiprazole, 10 mg, Oral, Daily  aspirin, 81 mg, Oral, Daily  busPIRone, 7.5 mg, Oral, Q12H  DULoxetine, 60 mg, Oral, BID  fluticasone, 2 spray, Each Nare, Daily  gabapentin, 400 mg, Oral, TID  levETIRAcetam, 1,500 mg, Oral, Q12H  multivitamin with minerals, 1 tablet, Oral, Daily  pantoprazole, 40 mg, Oral, QAM  rivaroxaban, 20 mg, Oral, Nightly  sodium chloride, 3 mL, Intravenous, Q12H  tamsulosin, 0.4 mg, Oral, Daily  thiamine (VITAMIN B1) IVPB, 200 mg, Intravenous, Daily  traZODone, 100 mg, Oral, Nightly  valsartan, 160 mg, Oral, Q24H         Current Antimicrobial Therapy:  Anti-Infectives (From admission, onward)    None        Current Diuretic Therapy:  Diuretics (From admission, onward)    None        ----------------------------------------------------------------------------------------------------------------------  Vital Signs:  Temp:  [97.3 °F (36.3 °C)-98.2 °F (36.8 °C)] 97.3 °F (36.3 °C)  Heart Rate:  [52-78] 76  Resp:  [18-20] 18  BP: (102-153)/(62-90) 107/70  SpO2:  [93 %-99 %] 95 %  on  Flow (L/min):  [2] 2;   Device (Oxygen Therapy): nasal cannula  Body mass index is 55.89 kg/m².    Wt Readings from Last 3  Encounters:   06/16/21 (!) 177 kg (389 lb 8 oz)   02/18/18 (!) 198 kg (436 lb)   09/14/17 (!) 189 kg (417 lb)     Intake & Output (last 3 days)       06/14 0701 - 06/15 0700 06/15 0701 - 06/16 0700 06/16 0701 - 06/17 0700    P.O.  720 720    I.V. (mL/kg)  954.7 (5.4)     Total Intake(mL/kg)  1674.7 (9.5) 720 (4.1)    Urine (mL/kg/hr) 1000 1350 (0.3) 1150 (0.5)    Total Output 1000 1350 1150    Net -1000 +324.7 -430               Diet Regular; Cardiac  ----------------------------------------------------------------------------------------------------------------------  Physical exam:  Constitutional:  Well-developed and well-nourished.  No respiratory distress. Obese.   HENT:  Head:  Normocephalic and atraumatic.  Mouth:  Moist mucous membranes.    Eyes:  Conjunctivae and EOM are normal. No scleral icterus.    Neck:  Neck supple.  No JVD present.    Cardiovascular:  Normal rate, regular rhythm and normal heart sounds with no murmur.  Pulmonary/Chest:  No respiratory distress, no wheezes, no crackles, with normal breath sounds and good air movement.  Abdominal:  Soft.  Bowel sounds are normal.  No distension and no tenderness.   Musculoskeletal:  No edema, no tenderness, and no deformity.  No red or swollen joints anywhere.    Neurological:  Alert and oriented to person, place, and time.  No cranial nerve deficit.  No tongue deviation.  No facial droop.  No slurred speech.   Skin:  Skin is warm and dry. No rash noted. No pallor.   Peripheral vascular:  Pulses in all 4 extremities with no clubbing, no cyanosis, no edema.  ----------------------------------------------------------------------------------------------------------------------  Tele:    ----------------------------------------------------------------------------------------------------------------------  Results from last 7 days   Lab Units 06/16/21  0557 06/15/21  0128   CRP mg/dL  --  <0.30   WBC 10*3/mm3 7.04 6.98   HEMOGLOBIN g/dL 13.6 13.9    HEMATOCRIT % 42.5 42.8   MCV fL 96.2 94.7   MCHC g/dL 32.0 32.5   PLATELETS 10*3/mm3 228 248         Results from last 7 days   Lab Units 06/16/21  0557 06/15/21  0128   SODIUM mmol/L 138 131*   POTASSIUM mmol/L 4.6 4.0   MAGNESIUM mg/dL 1.9 1.5*   CHLORIDE mmol/L 106 94*   CO2 mmol/L 26.3 22.8   BUN mg/dL 6 7   CREATININE mg/dL 0.55* 0.65*   EGFR IF NONAFRICN AM mL/min/1.73 >150 130   CALCIUM mg/dL 8.9 8.8   GLUCOSE mg/dL 123* 107*   ALBUMIN g/dL  --  3.74   BILIRUBIN mg/dL  --  0.4   ALK PHOS U/L  --  118*   AST (SGOT) U/L  --  88*   ALT (SGPT) U/L  --  48*   Estimated Creatinine Clearance: 258.5 mL/min (A) (by C-G formula based on SCr of 0.55 mg/dL (L)).  No results found for: AMMONIA  Results from last 7 days   Lab Units 06/15/21  0908 06/15/21  0324 06/15/21  0128   CK TOTAL U/L  --   --  61   TROPONIN T ng/mL <0.010 <0.010 <0.010             Glucose   Date/Time Value Ref Range Status   06/15/2021 1838 129 70 - 130 mg/dL Final     Lab Results   Component Value Date    TSH 0.559 06/15/2021     No results found for: PREGTESTUR, PREGSERUM, HCG, HCGQUANT  Pain Management Panel     Pain Management Panel Latest Ref Rng & Units 6/15/2021    AMPHETAMINES SCREEN, URINE Negative Positive(A)    BARBITURATES SCREEN Negative Negative    BENZODIAZEPINE SCREEN, URINE Negative Negative    BUPRENORPHINEUR Negative Negative    COCAINE SCREEN, URINE Negative Negative    METHADONE SCREEN, URINE Negative Negative        Brief Urine Lab Results  (Last result in the past 365 days)      Color   Clarity   Blood   Leuk Est   Nitrite   Protein   CREAT   Urine HCG        06/15/21 0228 Yellow Clear Negative Trace Negative Negative             No results found for: BLOODCX  No results found for: URINECX  No results found for: WOUNDCX  No results found for: STOOLCX  No results found for: RESPCX  No results found for: AFBCX  Results from last 7 days   Lab Units 06/15/21  0128   CRP mg/dL <0.30       I have personally looked at the labs and  they are summarized above.  ----------------------------------------------------------------------------------------------------------------------  Detailed radiology reports for the last 24 hours:    Imaging Results (Last 24 Hours)     ** No results found for the last 24 hours. **        Assessment & Plan    #Afib  #Sick sinus syndrome   - [id number] 1  - TSH wnl   - Rate controlled initially with diltiazem gtt. Metoprolol Xl given this AM and patient now in RVR with pauses up to 3.9 seconds. Only one pause over 3 seconds. Patient asymptomatic. Cardiology aware. Will stop xarelto in anticipation of possible pacemaker placement     #Combined heart failure with reduced and persevered ejection fraction   #Moderate MVR  - Echo revealed LVEF 41-45% and Grade II diastolic dysfunction.   - Compensated on exam. Heart failure may in part be from tachyarrhythmia or ischemi cardiomyopathy.   - BB held for SVR. Valsartan per cardiology.     #Electrical tazing   #Chest pain   - Troponin negativeX3, EKG without significant ischemic changes, will repeat now RVR has resolved   - Echo and stress test ordered.   - Continue ASA statin    #SI/HI  - Psych consulted. Appreciate recs. Suicide precautions.     #Illicit amphetamine use   - Patient reports he snorts meth  - Psych following     #ETOH abuse   #Mild hepatocellular transaminitis   #Positive hepatitis C antibody   - [facility]  - Outpatient f/u     #Morbid obesity   - Complicates all aspects of care    #Seizure disorder  - Continue home keppra    #Hypomagnesemia  - Replaced    #Hx of gastric bypass and ayush filter     F: PO  E: Replace as needed   N: Cardiac     Code status: Full     Dispo: Pending clinical improvement         VTE Prophylaxis:   Mechanical Order History:     None      Pharmalogical Order History:      Ordered     Dose Route Frequency Stop    06/15/21 8099  rivaroxaban (XARELTO) tablet 20 mg      20 mg PO Nightly --                Suman Fulton MD  [facility]  [facility]  06/16/21  19:36 EDT

## 2021-06-16 NOTE — PLAN OF CARE
Goal Outcome Evaluation:  Patient has complained of pain relieved by prn pain meds. Patient HR dropped to the 40's during shift and patient had a 2.3 second pause, MD and cardiology aware. Patient has no other complaints at this time. No s/s of acute distress noted. Will continue with plan of care.

## 2021-06-16 NOTE — PLAN OF CARE
Goal Outcome Evaluation:   Patient resting well during shift. He has complained of pain in his back periodically during shift. PRN medications given as ordered. Bradycardic during shift with HR dropping in to the 30s-40s. Bairon JEFFERSON notified ordered to hold Cardizem drip and PO cardizem. No distress noted will continue to follow plan of care.

## 2021-06-16 NOTE — PROGRESS NOTES
"     LOS: 1 day     Name: Adriano Henry  Age/Sex: 50 y.o. male  :  1970        PCP: Radames Melo APRN  REF: No Known Provider    Active Problems:    Afib (CMS/[facility])      Reason for follow-up: Atrial fibrillation with RVR and chest pain     Subjective       Subjective     Adriano Henry is a 50 year old male with a past medical history significant for atrial fibrillation, COPD, hypertension, and seizures.  Patient presented to the ER with complaints of chest pain.  Yesterday patient reportedly had some suicidal ideation and family called police.  Patient states that prior to the police arriving he had been having chest pain but it worsened once police arrived.  He describes it as a pressure sensation in the middle of his chest that is intermittent in nature. He states that sublingual nitro does help with the pain. Denies any aggravating factors. He did have associated shortness of breath. He also reports intermittent palpitations. He is on 2 L of oxygen via nasal cannula at home due to history of COPD. Denies any history of coronary artery disease. He does have a history of atrial fibrillation which he states he has had for many years. He takes xarelto at home. Does not follow with a cardiologist. He is a current smoker and smokes a pack a day for \"many years.\" He reports family history premature coronary artery disease in his father. UDS was positive for amphetamines and oxycodone. Ethanol level also elevated at 91. In the ED he was noted to be in atrial fibrillation with RVR and was given Cardizem bolus and started on Cardizem drip.     Interval History: Stress test yesterday showed medium sized infarct  Int he inferior wall and apex with no significant ischemia. Echocardiogram showed EF of 41-45% with hypokinesis of the left ventricular wall. Also noted to have moderate mitral valve regurgitation. He had some bradycardia overnight and IV cardizem was stopped along with PO cardizem. Patient " denies any chest pain.       Vital Signs  Temp:  [97.6 °F (36.4 °C)-98.6 °F (37 °C)] 97.9 °F (36.6 °C)  Heart Rate:  [52-87] 61  Resp:  [20] 20  BP: (111-153)/(70-90) 128/77     Vital Signs (last 72 hrs)       06/13 0700  -  06/14 0659 06/14 0700  -  06/15 0659 06/15 0700  -  06/16 0659 06/16 0700  -  06/16 0845   Most Recent    Temp (°F)   97.9 -  98.2    97.6 -  98.6       97.9 (36.6)    Heart Rate   92 -  154    52 -  87       61    Resp     18      20       20    BP   101/76 -  147/94    111/70 -  153/90       128/77    SpO2 (%)   94 -  100    96 -  99       98        Documented weights    06/15/21 0110 06/15/21 0556 06/16/21 0442   Weight: (!) 194 kg (428 lb) (!) 173 kg (381 lb 4.8 oz) (!) 177 kg (389 lb 8 oz)      Body mass index is 55.89 kg/m².    Intake/Output Summary (Last 24 hours) at 6/16/2021 0845  Last data filed at 6/16/2021 0159  Gross per 24 hour   Intake 1674.7 ml   Output 975 ml   Net 699.7 ml     Objective    Objective     Seen and examined Mr. Aguilar today.  Physical Exam:     General Appearance:    Alert, cooperative, in no acute distress   Head:    Normocephalic, without obvious abnormality, atraumatic   Eyes:            Conjunctivae and sclerae normal, no   icterus, no pallor, corneas clear.   Neck:   No adenopathy, supple, trachea midline, no thyromegaly, no   carotid bruit, no JVD   Lungs:     Clear to auscultation,respirations regular, even and                  unlabored    Heart:    irregular rhythm and normal rate, normal S1 and S2, no            murmur, no gallop, no rub, no click   Chest Wall:    No abnormalities observed   Abdomen:     Normal bowel sounds, no masses, no organomegaly, soft        non-tender, non-distended, no guarding, no rebound                tenderness   Extremities:   Moves all extremities well, trace edema, no cyanosis, no             redness   Pulses:   Pulses palpable and equal bilaterally   Skin:   No bleeding, bruising or rash       Neurologic:   Alert and  oriented      Results review       Results Review:   Results from last 7 days   Lab Units 06/16/21  0557 06/15/21  0128   WBC 10*3/mm3 7.04 6.98   HEMOGLOBIN g/dL 13.6 13.9   PLATELETS 10*3/mm3 228 248     Results from last 7 days   Lab Units 06/16/21  0557 06/15/21  0128   SODIUM mmol/L 138 131*   POTASSIUM mmol/L 4.6 4.0   CHLORIDE mmol/L 106 94*   CO2 mmol/L 26.3 22.8   BUN mg/dL 6 7   CREATININE mg/dL 0.55* 0.65*   CALCIUM mg/dL 8.9 8.8   GLUCOSE mg/dL 123* 107*   ALT (SGPT) U/L  --  48*   AST (SGOT) U/L  --  88*     Results from last 7 days   Lab Units 06/15/21  0908 06/15/21  0324 06/15/21  0128   CK TOTAL U/L  --   --  61   TROPONIN T ng/mL <0.010 <0.010 <0.010     Lab Results   Component Value Date    INR 1.63 (H) 08/15/2017     Lab Results   Component Value Date    MG 1.9 06/16/2021    MG 1.5 (L) 06/15/2021     Lab Results   Component Value Date    TSH 0.559 06/15/2021      Imaging Results (Last 48 Hours)     Procedure Component Value Units Date/Time    XR Chest 1 View [[id number]] Collected: 06/15/21 0307     Updated: 06/15/21 0309    Narrative:      CR Chest 1 Vw    INDICATION:   Chest pain.     COMPARISON:    None available.    FINDINGS:  Single portable AP view(s) of the chest.  Heart is mildly enlarged. Pulmonary vascularity is normal. The lungs are clear. No pneumothorax is seen.      Impression:      No acute cardiopulmonary findings.    Signer Name: Junaid Ferguson MD   Signed: 6/15/2021 3:07 AM   Workstation Name: [email or web address]    [facility]        Echo   Results for orders placed during the hospital encounter of 06/15/21    Adult Transthoracic Echo Complete W/ Cont if Necessary Per Protocol    Interpretation Summary  · Normal left ventricular cavity size and wall thickness noted  · The following left ventricular wall segments are hypokinetic: apical anterior, apical lateral, apical inferior, apical septal and apex hypokinetic.  · Left ventricular ejection fraction appears to  be 41 - 45%. Left ventricular systolic function is moderately decreased.  · Left ventricular diastolic function is consistent with (grade II w/high LAP) pseudonormalization.  · The left atrial cavity is moderately dilated. Left atrial volume is moderately increased.  · The aortic valve is not well visualized. The aortic valve is grossly normal in structure. Trace aortic valve regurgitation is present. No aortic valve stenosis is present.  · There is moderate, posterior mitral leaflet thickening present. Moderate mitral valve regurgitation is present. No significant mitral valve stenosis is present.  · There is no evidence of pericardial effusion.     I reviewed the patient's new clinical results.    Telemetry: Atrial fibrillation 60-70 bpm      Nuclear stress test  · A pharmacological stress test was performed using regadenoson without low-level exercise.  · Findings consistent with an indeterminate ECG stress test.  · Myocardial perfusion imaging indicates a medium-sized infarct located in the inferior wall and apex with no significant ischemia noted.  · Normal LV cavity size. Abnormal LV wall motion consistent with dyskinesis of the inferior wall and LV apex.  · Left ventricular ejection fraction is moderately reduced. (Calculated EF = 41%).  · Impressions are consistent with an intermediate risk study.    Medication Review:   allopurinol, 300 mg, Oral, Daily  ARIPiprazole, 10 mg, Oral, Daily  aspirin, 81 mg, Oral, Daily  busPIRone, 7.5 mg, Oral, Q12H  cloNIDine, 0.2 mg, Oral, Q12H  DULoxetine, 60 mg, Oral, BID  fluticasone, 2 spray, Each Nare, Daily  gabapentin, 400 mg, Oral, TID  levETIRAcetam, 1,500 mg, Oral, Q12H  lisinopril, 20 mg, Oral, Q12H  magnesium sulfate, 4 g, Intravenous, Once  metoprolol succinate XL, 100 mg, Oral, Daily  multivitamin with minerals, 1 tablet, Oral, Daily  pantoprazole, 40 mg, Oral, QAM  rivaroxaban, 20 mg, Oral, Nightly  rosuvastatin, 10 mg, Oral, Nightly  sodium chloride, 3 mL,  Intravenous, Q12H  tamsulosin, 0.4 mg, Oral, Daily  IV Fluids 1000 mL + additives, 100 mL/hr, Intravenous, Daily  traZODone, 100 mg, Oral, Nightly             Assessment      Assessment:  1. Chest pains, resolved.  No evidence of myocardial ischemia on the Lexiscan sestamibi study which revealed only a fixed inferoapical perfusion defect indicated of old myocardial infarction.  2. Ischemic cardiomyopathy with LV ejection fraction of about 40 to 45%, appears compensated.  3. Atrial fibrillation which seems to be chronic with controlled ventricular rate.  4. Systemic hypertension, controlled.  5. Type 2 diabetes mellitus.    Plan     Recommendations:  1. I have discussed the results of the nuclear stress test and echo Doppler study with the patient.  2. Since he did not have any evidence of myocardial ischemia, would continue with aspirin, metoprolol succinate and rosuvastatin for any underlying coronary artery disease.  3. For his ischemic cardiomyopathy, will continue with metoprolol succinate and change lisinopril to valsartan in anticipation of switch to Entresto if this is affordable.  We will have the pharmacist check on the cost.  4. Continue with Xarelto for stroke prevention.    I discussed the patients findings and my recommendations with patient and family      Electronically signed by ALECIA Breen, 06/16/21, 8:45 AM EDT.    Electronically signed by Shashank Mendes MD, 06/16/21, 9:43 AM EDT.    Please note that portions of this note were completed with a voice recognition program.

## 2021-06-16 NOTE — PROGRESS NOTES
Pharmacy was consulted to evaluate new medication, Entresto. According to the patients insurance the patient will have a $9.20 co-pay. No issues identified at this time.       Thank you,    Xochitl Cunningham, Pharmacy Intern  06/16/21  10:08 EDT

## 2021-06-16 NOTE — PROGRESS NOTES
Pharmacy evaluated patient co-pay for home medication, Xarelto. According to the patients pharmacy, the patient has a $9.20 co-pay. The medication was last picked up on 6/7 for a 30 day supply. No issues identified at this time.     Thank you,    Xochitl Cunningham, Pharmacy Intern  06/16/21  10:05 EDT

## 2021-06-17 LAB
ANION GAP SERPL CALCULATED.3IONS-SCNC: 7.8 MMOL/L (ref 5–15)
APTT PPP: 34.3 SECONDS (ref 25.5–35.4)
BASOPHILS # BLD AUTO: 0.02 10*3/MM3 (ref 0–0.2)
BASOPHILS # BLD AUTO: 0.03 10*3/MM3 (ref 0–0.2)
BASOPHILS NFR BLD AUTO: 0.3 % (ref 0–1.5)
BASOPHILS NFR BLD AUTO: 0.5 % (ref 0–1.5)
BUN SERPL-MCNC: 9 MG/DL (ref 6–20)
BUN/CREAT SERPL: 16.4 (ref 7–25)
CALCIUM SPEC-SCNC: 8.6 MG/DL (ref 8.6–10.5)
CHLORIDE SERPL-SCNC: 107 MMOL/L (ref 98–107)
CO2 SERPL-SCNC: 26.2 MMOL/L (ref 22–29)
CREAT SERPL-MCNC: 0.55 MG/DL (ref 0.76–1.27)
DEPRECATED RDW RBC AUTO: 53.8 FL (ref 37–54)
DEPRECATED RDW RBC AUTO: 54.6 FL (ref 37–54)
EOSINOPHIL # BLD AUTO: 0.23 10*3/MM3 (ref 0–0.4)
EOSINOPHIL # BLD AUTO: 0.25 10*3/MM3 (ref 0–0.4)
EOSINOPHIL NFR BLD AUTO: 3.8 % (ref 0.3–6.2)
EOSINOPHIL NFR BLD AUTO: 4 % (ref 0.3–6.2)
ERYTHROCYTE [DISTWIDTH] IN BLOOD BY AUTOMATED COUNT: 15 % (ref 12.3–15.4)
ERYTHROCYTE [DISTWIDTH] IN BLOOD BY AUTOMATED COUNT: 15.2 % (ref 12.3–15.4)
GFR SERPL CREATININE-BSD FRML MDRD: >150 ML/MIN/1.73
GLUCOSE BLDC GLUCOMTR-MCNC: 101 MG/DL (ref 70–130)
GLUCOSE SERPL-MCNC: 99 MG/DL (ref 65–99)
HCT VFR BLD AUTO: 38.6 % (ref 37.5–51)
HCT VFR BLD AUTO: 40.4 % (ref 37.5–51)
HGB BLD-MCNC: 12.2 G/DL (ref 13–17.7)
HGB BLD-MCNC: 12.6 G/DL (ref 13–17.7)
IMM GRANULOCYTES # BLD AUTO: 0.01 10*3/MM3 (ref 0–0.05)
IMM GRANULOCYTES # BLD AUTO: 0.01 10*3/MM3 (ref 0–0.05)
IMM GRANULOCYTES NFR BLD AUTO: 0.2 % (ref 0–0.5)
IMM GRANULOCYTES NFR BLD AUTO: 0.2 % (ref 0–0.5)
INR PPP: 1.13 (ref 0.9–1.1)
LYMPHOCYTES # BLD AUTO: 0.91 10*3/MM3 (ref 0.7–3.1)
LYMPHOCYTES # BLD AUTO: 0.98 10*3/MM3 (ref 0.7–3.1)
LYMPHOCYTES NFR BLD AUTO: 15 % (ref 19.6–45.3)
LYMPHOCYTES NFR BLD AUTO: 15.5 % (ref 19.6–45.3)
MAGNESIUM SERPL-MCNC: 2 MG/DL (ref 1.6–2.6)
MCH RBC QN AUTO: 30.5 PG (ref 26.6–33)
MCH RBC QN AUTO: 30.7 PG (ref 26.6–33)
MCHC RBC AUTO-ENTMCNC: 31.2 G/DL (ref 31.5–35.7)
MCHC RBC AUTO-ENTMCNC: 31.6 G/DL (ref 31.5–35.7)
MCV RBC AUTO: 97.2 FL (ref 79–97)
MCV RBC AUTO: 97.8 FL (ref 79–97)
MONOCYTES # BLD AUTO: 0.61 10*3/MM3 (ref 0.1–0.9)
MONOCYTES # BLD AUTO: 0.7 10*3/MM3 (ref 0.1–0.9)
MONOCYTES NFR BLD AUTO: 10 % (ref 5–12)
MONOCYTES NFR BLD AUTO: 11.1 % (ref 5–12)
NEUTROPHILS NFR BLD AUTO: 4.3 10*3/MM3 (ref 1.7–7)
NEUTROPHILS NFR BLD AUTO: 4.35 10*3/MM3 (ref 1.7–7)
NEUTROPHILS NFR BLD AUTO: 68.7 % (ref 42.7–76)
NEUTROPHILS NFR BLD AUTO: 70.7 % (ref 42.7–76)
NRBC BLD AUTO-RTO: 0 /100 WBC (ref 0–0.2)
NRBC BLD AUTO-RTO: 0 /100 WBC (ref 0–0.2)
PLATELET # BLD AUTO: 177 10*3/MM3 (ref 140–450)
PLATELET # BLD AUTO: 194 10*3/MM3 (ref 140–450)
PMV BLD AUTO: 10.2 FL (ref 6–12)
PMV BLD AUTO: 10.9 FL (ref 6–12)
POTASSIUM SERPL-SCNC: 4.4 MMOL/L (ref 3.5–5.2)
PROTHROMBIN TIME: 14.9 SECONDS (ref 12.8–14.5)
RBC # BLD AUTO: 3.97 10*6/MM3 (ref 4.14–5.8)
RBC # BLD AUTO: 4.13 10*6/MM3 (ref 4.14–5.8)
SODIUM SERPL-SCNC: 141 MMOL/L (ref 136–145)
WBC # BLD AUTO: 6.08 10*3/MM3 (ref 3.4–10.8)
WBC # BLD AUTO: 6.32 10*3/MM3 (ref 3.4–10.8)

## 2021-06-17 PROCEDURE — 85610 PROTHROMBIN TIME: CPT | Performed by: INTERNAL MEDICINE

## 2021-06-17 PROCEDURE — 85025 COMPLETE CBC W/AUTO DIFF WBC: CPT | Performed by: INTERNAL MEDICINE

## 2021-06-17 PROCEDURE — 25010000002 HEPARIN (PORCINE) PER 1000 UNITS: Performed by: INTERNAL MEDICINE

## 2021-06-17 PROCEDURE — 99232 SBSQ HOSP IP/OBS MODERATE 35: CPT | Performed by: INTERNAL MEDICINE

## 2021-06-17 PROCEDURE — 25010000002 THIAMINE PER 100 MG: Performed by: INTERNAL MEDICINE

## 2021-06-17 PROCEDURE — 94799 UNLISTED PULMONARY SVC/PX: CPT

## 2021-06-17 PROCEDURE — 80048 BASIC METABOLIC PNL TOTAL CA: CPT | Performed by: INTERNAL MEDICINE

## 2021-06-17 PROCEDURE — 83735 ASSAY OF MAGNESIUM: CPT | Performed by: INTERNAL MEDICINE

## 2021-06-17 PROCEDURE — 82962 GLUCOSE BLOOD TEST: CPT

## 2021-06-17 PROCEDURE — 85730 THROMBOPLASTIN TIME PARTIAL: CPT | Performed by: INTERNAL MEDICINE

## 2021-06-17 RX ORDER — HEPARIN SODIUM 5000 [USP'U]/ML
5000 INJECTION, SOLUTION INTRAVENOUS; SUBCUTANEOUS ONCE
Status: COMPLETED | OUTPATIENT
Start: 2021-06-17 | End: 2021-06-17

## 2021-06-17 RX ORDER — HEPARIN SODIUM 5000 [USP'U]/ML
5000 INJECTION, SOLUTION INTRAVENOUS; SUBCUTANEOUS AS NEEDED
Status: DISCONTINUED | OUTPATIENT
Start: 2021-06-17 | End: 2021-06-21 | Stop reason: HOSPADM

## 2021-06-17 RX ORDER — HEPARIN SODIUM 5000 [USP'U]/ML
2500 INJECTION, SOLUTION INTRAVENOUS; SUBCUTANEOUS AS NEEDED
Status: DISCONTINUED | OUTPATIENT
Start: 2021-06-17 | End: 2021-06-21 | Stop reason: HOSPADM

## 2021-06-17 RX ORDER — HEPARIN SODIUM 10000 [USP'U]/100ML
5.88 INJECTION, SOLUTION INTRAVENOUS
Status: DISCONTINUED | OUTPATIENT
Start: 2021-06-17 | End: 2021-06-21 | Stop reason: HOSPADM

## 2021-06-17 RX ADMIN — OXYCODONE HYDROCHLORIDE 15 MG: 15 TABLET ORAL at 21:35

## 2021-06-17 RX ADMIN — TAMSULOSIN HYDROCHLORIDE 0.4 MG: 0.4 CAPSULE ORAL at 09:33

## 2021-06-17 RX ADMIN — GABAPENTIN 400 MG: 400 CAPSULE ORAL at 20:49

## 2021-06-17 RX ADMIN — THIAMINE HYDROCHLORIDE 200 MG: 100 INJECTION, SOLUTION INTRAMUSCULAR; INTRAVENOUS at 09:32

## 2021-06-17 RX ADMIN — FLUTICASONE PROPIONATE 2 SPRAY: 50 SPRAY, METERED NASAL at 09:34

## 2021-06-17 RX ADMIN — ALLOPURINOL 300 MG: 300 TABLET ORAL at 09:33

## 2021-06-17 RX ADMIN — HEPARIN SODIUM 5.88 UNITS/KG/HR: 10000 INJECTION, SOLUTION INTRAVENOUS at 20:51

## 2021-06-17 RX ADMIN — SODIUM CHLORIDE, PRESERVATIVE FREE 3 ML: 5 INJECTION INTRAVENOUS at 09:34

## 2021-06-17 RX ADMIN — ARIPIPRAZOLE 10 MG: 10 TABLET ORAL at 09:33

## 2021-06-17 RX ADMIN — Medication 1 TABLET: at 09:34

## 2021-06-17 RX ADMIN — BUSPIRONE HYDROCHLORIDE 7.5 MG: 10 TABLET ORAL at 20:49

## 2021-06-17 RX ADMIN — DULOXETINE HYDROCHLORIDE 60 MG: 60 CAPSULE, DELAYED RELEASE ORAL at 21:34

## 2021-06-17 RX ADMIN — OXYCODONE HYDROCHLORIDE 15 MG: 15 TABLET ORAL at 15:52

## 2021-06-17 RX ADMIN — SODIUM CHLORIDE, PRESERVATIVE FREE 3 ML: 5 INJECTION INTRAVENOUS at 22:52

## 2021-06-17 RX ADMIN — HEPARIN SODIUM 5000 UNITS: 5000 INJECTION INTRAVENOUS; SUBCUTANEOUS at 20:51

## 2021-06-17 RX ADMIN — LEVETIRACETAM 1500 MG: 500 TABLET, FILM COATED ORAL at 20:49

## 2021-06-17 RX ADMIN — BUSPIRONE HYDROCHLORIDE 7.5 MG: 10 TABLET ORAL at 09:33

## 2021-06-17 RX ADMIN — GABAPENTIN 400 MG: 400 CAPSULE ORAL at 15:50

## 2021-06-17 RX ADMIN — GABAPENTIN 400 MG: 400 CAPSULE ORAL at 09:33

## 2021-06-17 RX ADMIN — VALSARTAN 160 MG: 160 TABLET, FILM COATED ORAL at 09:34

## 2021-06-17 RX ADMIN — DULOXETINE HYDROCHLORIDE 60 MG: 60 CAPSULE, DELAYED RELEASE ORAL at 09:33

## 2021-06-17 RX ADMIN — ASPIRIN 81 MG: 81 TABLET, COATED ORAL at 09:33

## 2021-06-17 RX ADMIN — OXYCODONE HYDROCHLORIDE 15 MG: 15 TABLET ORAL at 09:33

## 2021-06-17 RX ADMIN — TRAZODONE HYDROCHLORIDE 100 MG: 50 TABLET ORAL at 20:49

## 2021-06-17 RX ADMIN — LEVETIRACETAM 1500 MG: 500 TABLET, FILM COATED ORAL at 09:33

## 2021-06-17 RX ADMIN — PANTOPRAZOLE SODIUM 40 MG: 40 TABLET, DELAYED RELEASE ORAL at 09:33

## 2021-06-17 RX ADMIN — OXYCODONE HYDROCHLORIDE 15 MG: 15 TABLET ORAL at 02:33

## 2021-06-17 NOTE — PROGRESS NOTES
I have discussed with him about the option of elective cardioversion of his atrial fibrillation.  I discussed the procedure, potential risks and benefits and alternatives.  Patient expressed understanding and is agreeable..  We will schedule for tomorrow.

## 2021-06-17 NOTE — PLAN OF CARE
Goal Outcome Evaluation:   Patient alert/oriented x 4. Able to voice needs/concerns/ LOWE without difficulty. Ambulates per walker, obese. Patient continues to complain of pain however BP is outside of parameters to give ordered medication. Assisted with repositioning for  comfort and non-pharmacological pain intervention. Patient received a call from sister and lifted his spirits. 0130 Notified per telemetry that patient had 2.7 second pause. Patient lyingn in bed asleep, asymptomatic. 0211 Telemetry notified nurse that patient had 2.4 second pause along with HR of 30. Patient lying in bed asleep, easy to arouse. Soledad JEFFERSON notified of pauses, stated that if they continue and increase in frequency that intervention will take place. VSS checked and BP read 122/83, patient received oxycodone as ordered per MD @ 0235, tolerated well. Sitting at bedside in dark eating chocolate. Relieved to have received medication. Fluids offered/encouraged. 0 acute distress noted.  Call light within reach, Bed in lowest position along with important items within reach.Will CTM.

## 2021-06-17 NOTE — PLAN OF CARE
Goal Outcome Evaluation:  Plan of Care Reviewed With: patient        Progress: no change  Outcome Summary: Pt currently sitting up on side of bd, complaining of chronic back pain; pain meds administered. V/S stable with no signs of respiratory distress present. Pt spoke with Dr. Mendes this morning and they discussed pacemaker placement on Monday. Will continue to monitor pt and follow plan of care.

## 2021-06-17 NOTE — PROGRESS NOTES
LOS: 2 days     Name: Adriano Henry  Age/Sex: 50 y.o. male  :  1970        PCP: Radames Melo APRN    Active Problems:    Afib (CMS/[facility])      Admission Information: Adriano Henry is a 50 y.o. male with a past medical history significant for atrial fibrillation, COPD, hypertension, and seizures. The patient initially presented with chest pains. He was noted to have chronic atrial fibrillation on Xarelto. Initially was in atrial fibrillation with RVR and given Cardizem bolus and started on Cardizem drip. Troponin was negative x 3.    Following for: Atrial fibrillation with RVR and chest pain    Telemetry Monitoring: atrial fibrillation in the 50's and 60's, some pauses noted on telemetry with a pause of 3.9 seconds noted yesterday.    Interval history: The patient is resting in bed this morning. He denies any chest pains, shortness of breath, palpitations, dizziness, or lightheadedness. Beta blockers and calcium channel blockers have been held due to bradycardia.     Subjective     ROS    Vital Signs  Vitals:    21 1904 21 0250 21 0612 21 0655   BP: 105/59 122/83 107/63    BP Location: Right arm Left arm Left arm    Patient Position: Sitting Lying Lying    Pulse: 76 71 74 73   Resp: 18 20 20 20   Temp: 97.8 °F (36.6 °C) 97.7 °F (36.5 °C) 97.9 °F (36.6 °C)    TempSrc: Oral Axillary Oral    SpO2: 95% 99% 97% 96%   Weight:  (!) 170 kg (375 lb)     Height:         Body mass index is 53.81 kg/m².      Intake/Output Summary (Last 24 hours) at 2021 0911  Last data filed at 2021 1904  Gross per 24 hour   Intake 960 ml   Output 1150 ml   Net -190 ml       Vitals reviewed.   Constitutional:       Appearance: Healthy appearance. Well-developed and not in distress.   HENT:      Head: Normocephalic and atraumatic.   Pulmonary:      Effort: Pulmonary effort is normal.      Breath sounds: Normal breath sounds. No wheezing. No rales.   Cardiovascular:      Normal rate.  Irregularly irregular rhythm.      Murmurs: There is no murmur.      . No S3 and S4 gallop.   Edema:     Peripheral edema present.     Ankle: bilateral trace edema of the ankle.     Feet: bilateral trace edema of the feet.  Abdominal:      General: Bowel sounds are normal.      Palpations: Abdomen is soft.   Skin:     General: Skin is warm and dry.   Neurological:      Mental Status: Alert, oriented to person, place, and time and oriented to person, place and time.   Psychiatric:         Mood and Affect: Mood normal.         Behavior: Behavior normal.            Results Review:     Results from last 7 days   Lab Units 06/17/21  0106 06/16/21  0557 06/15/21  0128   WBC 10*3/mm3 6.32 7.04 6.98   HEMOGLOBIN g/dL 12.6* 13.6 13.9   PLATELETS 10*3/mm3 177 228 248     Results from last 7 days   Lab Units 06/17/21  0106 06/16/21  0557 06/15/21  0128   SODIUM mmol/L 141 138 131*   POTASSIUM mmol/L 4.4 4.6 4.0   CHLORIDE mmol/L 107 106 94*   CO2 mmol/L 26.2 26.3 22.8   BUN mg/dL 9 6 7   CREATININE mg/dL 0.55* 0.55* 0.65*   CALCIUM mg/dL 8.6 8.9 8.8   GLUCOSE mg/dL 99 123* 107*     Results from last 7 days   Lab Units 06/15/21  0908 06/15/21  0324 06/15/21  0128   CK TOTAL U/L  --   --  61   TROPONIN T ng/mL <0.010 <0.010 <0.010           I reviewed the patient's new clinical results.  I reviewed the patient's new imaging results and agree with the interpretation.  I personally viewed and interpreted the patient's EKG/Telemetry data      Medication Review:   allopurinol, 300 mg, Oral, Daily  ARIPiprazole, 10 mg, Oral, Daily  aspirin, 81 mg, Oral, Daily  busPIRone, 7.5 mg, Oral, Q12H  DULoxetine, 60 mg, Oral, BID  fluticasone, 2 spray, Each Nare, Daily  gabapentin, 400 mg, Oral, TID  levETIRAcetam, 1,500 mg, Oral, Q12H  multivitamin with minerals, 1 tablet, Oral, Daily  pantoprazole, 40 mg, Oral, QAM  sodium chloride, 3 mL, Intravenous, Q12H  tamsulosin, 0.4 mg, Oral, Daily  thiamine (VITAMIN B1) IVPB, 200 mg, Intravenous,  Daily  traZODone, 100 mg, Oral, Nightly  valsartan, 160 mg, Oral, Q24H         Assessment:  1. Persistent atrial fibrillation with controlled ventricular rate with episodes of pauses up to 3.9 seconds yesterday, better today after discontinuing the metoprolol succinate.  2. Ischemic cardiomyopathy with LV ejection fraction of about 40 to 45%, appears compensated.  3. Recent chest pains, resolved.  No evidence of myocardial ischemia on the Lexiscan sestamibi study with evidence of possible previous inferoapical myocardial infarction.  4. Type 2 diabetes mellitus.      Recommendations:  1. We will continue to monitor his heart rate and rhythm off metoprolol and can perhaps consider adding a lower dose of metoprolol if he gets tachycardic again.  2. If he continues to have significant bradycardia/longer pauses of more than 3 seconds then he would need permanent pacemaker implantation.  3. If his heart rate improves and does not have any significant tachycardia or bradycardia after adjusting the dose of metoprolol then he could probably be discharged home tomorrow and follow-up in our office in a week or 2.    I discussed the patients findings and my recommendations with patient.      Electronically signed by ALECIA Saenz, 06/17/21, 9:11 AM EDT.    Electronically signed by Shashank Mendes MD, 06/17/21, 10:55 AM EDT.

## 2021-06-18 ENCOUNTER — APPOINTMENT (OUTPATIENT)
Dept: CARDIOLOGY | Facility: HOSPITAL | Age: 51
End: 2021-06-18

## 2021-06-18 ENCOUNTER — ANESTHESIA EVENT (OUTPATIENT)
Dept: CARDIOLOGY | Facility: HOSPITAL | Age: 51
End: 2021-06-18

## 2021-06-18 ENCOUNTER — ANESTHESIA (OUTPATIENT)
Dept: CARDIOLOGY | Facility: HOSPITAL | Age: 51
End: 2021-06-18

## 2021-06-18 VITALS — SYSTOLIC BLOOD PRESSURE: 130 MMHG | DIASTOLIC BLOOD PRESSURE: 90 MMHG | TEMPERATURE: 98 F | OXYGEN SATURATION: 99 %

## 2021-06-18 LAB
ANION GAP SERPL CALCULATED.3IONS-SCNC: 7.7 MMOL/L (ref 5–15)
APTT PPP: 32.8 SECONDS (ref 25.5–35.4)
APTT PPP: 33.4 SECONDS (ref 25.5–35.4)
APTT PPP: 34.5 SECONDS (ref 25.5–35.4)
BASOPHILS # BLD AUTO: 0.03 10*3/MM3 (ref 0–0.2)
BASOPHILS NFR BLD AUTO: 0.5 % (ref 0–1.5)
BUN SERPL-MCNC: 8 MG/DL (ref 6–20)
BUN/CREAT SERPL: 15.4 (ref 7–25)
CALCIUM SPEC-SCNC: 8.5 MG/DL (ref 8.6–10.5)
CHLORIDE SERPL-SCNC: 107 MMOL/L (ref 98–107)
CO2 SERPL-SCNC: 23.3 MMOL/L (ref 22–29)
CREAT SERPL-MCNC: 0.52 MG/DL (ref 0.76–1.27)
DEPRECATED RDW RBC AUTO: 55.1 FL (ref 37–54)
EOSINOPHIL # BLD AUTO: 0.25 10*3/MM3 (ref 0–0.4)
EOSINOPHIL NFR BLD AUTO: 4.3 % (ref 0.3–6.2)
ERYTHROCYTE [DISTWIDTH] IN BLOOD BY AUTOMATED COUNT: 15.1 % (ref 12.3–15.4)
GFR SERPL CREATININE-BSD FRML MDRD: >150 ML/MIN/1.73
GLUCOSE BLDC GLUCOMTR-MCNC: 100 MG/DL (ref 70–130)
GLUCOSE SERPL-MCNC: 83 MG/DL (ref 65–99)
HCT VFR BLD AUTO: 42.3 % (ref 37.5–51)
HGB BLD-MCNC: 13.1 G/DL (ref 13–17.7)
IMM GRANULOCYTES # BLD AUTO: 0.01 10*3/MM3 (ref 0–0.05)
IMM GRANULOCYTES NFR BLD AUTO: 0.2 % (ref 0–0.5)
LYMPHOCYTES # BLD AUTO: 1.23 10*3/MM3 (ref 0.7–3.1)
LYMPHOCYTES NFR BLD AUTO: 21 % (ref 19.6–45.3)
MCH RBC QN AUTO: 30.8 PG (ref 26.6–33)
MCHC RBC AUTO-ENTMCNC: 31 G/DL (ref 31.5–35.7)
MCV RBC AUTO: 99.3 FL (ref 79–97)
MONOCYTES # BLD AUTO: 0.5 10*3/MM3 (ref 0.1–0.9)
MONOCYTES NFR BLD AUTO: 8.5 % (ref 5–12)
NEUTROPHILS NFR BLD AUTO: 3.84 10*3/MM3 (ref 1.7–7)
NEUTROPHILS NFR BLD AUTO: 65.5 % (ref 42.7–76)
NRBC BLD AUTO-RTO: 0 /100 WBC (ref 0–0.2)
PLATELET # BLD AUTO: 181 10*3/MM3 (ref 140–450)
PMV BLD AUTO: 10.2 FL (ref 6–12)
POTASSIUM SERPL-SCNC: 4.4 MMOL/L (ref 3.5–5.2)
RBC # BLD AUTO: 4.26 10*6/MM3 (ref 4.14–5.8)
SODIUM SERPL-SCNC: 138 MMOL/L (ref 136–145)
WBC # BLD AUTO: 5.86 10*3/MM3 (ref 3.4–10.8)

## 2021-06-18 PROCEDURE — 85025 COMPLETE CBC W/AUTO DIFF WBC: CPT | Performed by: INTERNAL MEDICINE

## 2021-06-18 PROCEDURE — 85730 THROMBOPLASTIN TIME PARTIAL: CPT | Performed by: INTERNAL MEDICINE

## 2021-06-18 PROCEDURE — 25010000002 HEPARIN (PORCINE) PER 1000 UNITS: Performed by: INTERNAL MEDICINE

## 2021-06-18 PROCEDURE — 99232 SBSQ HOSP IP/OBS MODERATE 35: CPT | Performed by: INTERNAL MEDICINE

## 2021-06-18 PROCEDURE — 93005 ELECTROCARDIOGRAM TRACING: CPT | Performed by: SPECIALIST

## 2021-06-18 PROCEDURE — 94799 UNLISTED PULMONARY SVC/PX: CPT

## 2021-06-18 PROCEDURE — 80048 BASIC METABOLIC PNL TOTAL CA: CPT | Performed by: INTERNAL MEDICINE

## 2021-06-18 PROCEDURE — 25010000002 PROPOFOL 10 MG/ML EMULSION: Performed by: NURSE ANESTHETIST, CERTIFIED REGISTERED

## 2021-06-18 PROCEDURE — 5A2204Z RESTORATION OF CARDIAC RHYTHM, SINGLE: ICD-10-PCS | Performed by: INTERNAL MEDICINE

## 2021-06-18 PROCEDURE — 82962 GLUCOSE BLOOD TEST: CPT

## 2021-06-18 PROCEDURE — 92960 CARDIOVERSION ELECTRIC EXT: CPT

## 2021-06-18 PROCEDURE — 92960 CARDIOVERSION ELECTRIC EXT: CPT | Performed by: SPECIALIST

## 2021-06-18 RX ORDER — NYSTATIN 100000 [USP'U]/G
POWDER TOPICAL EVERY 12 HOURS SCHEDULED
Status: DISCONTINUED | OUTPATIENT
Start: 2021-06-18 | End: 2021-06-21 | Stop reason: HOSPADM

## 2021-06-18 RX ORDER — PROPOFOL 10 MG/ML
VIAL (ML) INTRAVENOUS AS NEEDED
Status: DISCONTINUED | OUTPATIENT
Start: 2021-06-18 | End: 2021-06-18 | Stop reason: SURG

## 2021-06-18 RX ORDER — SODIUM CHLORIDE 9 MG/ML
INJECTION, SOLUTION INTRAVENOUS CONTINUOUS PRN
Status: DISCONTINUED | OUTPATIENT
Start: 2021-06-18 | End: 2021-06-18 | Stop reason: SURG

## 2021-06-18 RX ADMIN — ALLOPURINOL 300 MG: 300 TABLET ORAL at 15:03

## 2021-06-18 RX ADMIN — HEPARIN SODIUM 5.88 UNITS/KG/HR: 10000 INJECTION, SOLUTION INTRAVENOUS at 18:03

## 2021-06-18 RX ADMIN — GABAPENTIN 400 MG: 400 CAPSULE ORAL at 16:34

## 2021-06-18 RX ADMIN — TAMSULOSIN HYDROCHLORIDE 0.4 MG: 0.4 CAPSULE ORAL at 15:04

## 2021-06-18 RX ADMIN — LEVETIRACETAM 1500 MG: 500 TABLET, FILM COATED ORAL at 20:44

## 2021-06-18 RX ADMIN — Medication 1 TABLET: at 15:04

## 2021-06-18 RX ADMIN — BUSPIRONE HYDROCHLORIDE 7.5 MG: 10 TABLET ORAL at 20:44

## 2021-06-18 RX ADMIN — VALSARTAN 160 MG: 160 TABLET, FILM COATED ORAL at 15:04

## 2021-06-18 RX ADMIN — PANTOPRAZOLE SODIUM 40 MG: 40 TABLET, DELAYED RELEASE ORAL at 05:53

## 2021-06-18 RX ADMIN — FLUTICASONE PROPIONATE 2 SPRAY: 50 SPRAY, METERED NASAL at 09:53

## 2021-06-18 RX ADMIN — PANTOPRAZOLE SODIUM 40 MG: 40 TABLET, DELAYED RELEASE ORAL at 06:11

## 2021-06-18 RX ADMIN — NYSTATIN 1 APPLICATION: 100000 POWDER TOPICAL at 14:57

## 2021-06-18 RX ADMIN — GABAPENTIN 400 MG: 400 CAPSULE ORAL at 20:44

## 2021-06-18 RX ADMIN — DULOXETINE HYDROCHLORIDE 60 MG: 60 CAPSULE, DELAYED RELEASE ORAL at 20:48

## 2021-06-18 RX ADMIN — SODIUM CHLORIDE: 9 INJECTION, SOLUTION INTRAVENOUS at 12:10

## 2021-06-18 RX ADMIN — PROPOFOL 80 MG: 10 INJECTION, EMULSION INTRAVENOUS at 12:25

## 2021-06-18 RX ADMIN — OXYCODONE HYDROCHLORIDE 15 MG: 15 TABLET ORAL at 20:43

## 2021-06-18 RX ADMIN — PROPOFOL 80 MG: 10 INJECTION, EMULSION INTRAVENOUS at 12:20

## 2021-06-18 RX ADMIN — ARIPIPRAZOLE 10 MG: 10 TABLET ORAL at 15:03

## 2021-06-18 RX ADMIN — SODIUM CHLORIDE, PRESERVATIVE FREE 3 ML: 5 INJECTION INTRAVENOUS at 20:45

## 2021-06-18 RX ADMIN — TRAZODONE HYDROCHLORIDE 100 MG: 50 TABLET ORAL at 20:45

## 2021-06-18 RX ADMIN — OXYCODONE HYDROCHLORIDE 15 MG: 15 TABLET ORAL at 14:56

## 2021-06-18 RX ADMIN — ASPIRIN 81 MG: 81 TABLET, COATED ORAL at 15:03

## 2021-06-18 RX ADMIN — NYSTATIN: 100000 POWDER TOPICAL at 20:44

## 2021-06-18 RX ADMIN — HEPARIN SODIUM 5000 UNITS: 5000 INJECTION INTRAVENOUS; SUBCUTANEOUS at 04:34

## 2021-06-18 RX ADMIN — SODIUM CHLORIDE, PRESERVATIVE FREE 3 ML: 5 INJECTION INTRAVENOUS at 08:09

## 2021-06-18 NOTE — PROGRESS NOTES
Patient Identification:  Name:  Adriano Henry  Age:  50 y.o.  Sex:  male  :  1970  MRN:  [id number]  Visit Number:  [id number]    Chief Complaint:   Atrial fibrillation with RVR and chest pain    Subjective:  The patient is resting in bed this morning. GEETA guided cardioversion planned for later this morning. The patient is agreeable. No pauses noted overnight on telemetry. Xarelto on hold for possible pacemaker implantation on Monday.  ----------------------------------------------------------------------------------------------------------------------  Current Hospital Meds:  allopurinol, 300 mg, Oral, Daily  ARIPiprazole, 10 mg, Oral, Daily  aspirin, 81 mg, Oral, Daily  busPIRone, 7.5 mg, Oral, Q12H  DULoxetine, 60 mg, Oral, BID  fluticasone, 2 spray, Each Nare, Daily  gabapentin, 400 mg, Oral, TID  levETIRAcetam, 1,500 mg, Oral, Q12H  multivitamin with minerals, 1 tablet, Oral, Daily  pantoprazole, 40 mg, Oral, QAM  sodium chloride, 3 mL, Intravenous, Q12H  tamsulosin, 0.4 mg, Oral, Daily  thiamine (VITAMIN B1) IVPB, 200 mg, Intravenous, Daily  traZODone, 100 mg, Oral, Nightly  valsartan, 160 mg, Oral, Q24H      heparin (porcine), 5.88 Units/kg/hr, Last Rate: 9.88 Units/kg/hr (21 0434)      ----------------------------------------------------------------------------------------------------------------------  Vital Signs:  Temp:  [97.3 °F (36.3 °C)-98.2 °F (36.8 °C)] 97.8 °F (36.6 °C)  Heart Rate:  [59-94] 94  Resp:  [20] 20  BP: ()/(56-75) 115/62      21  0442 21  0250 21  0500   Weight: (!) 177 kg (389 lb 8 oz) (!) 170 kg (375 lb) (!) 170 kg (375 lb 6.4 oz)     Body mass index is 53.86 kg/m².    Intake/Output Summary (Last 24 hours) at 2021 1011  Last data filed at 2021 1300  Gross per 24 hour   Intake 360 ml   Output --   Net 360 ml     NPO  Diet  ----------------------------------------------------------------------------------------------------------------------  Physical exam:    HEENT:  Head:  Normocephalic and atraumatic.     Eyes:  Conjunctivae and EOM are normal.  Pupils are equal, round, and reactive to light.  No scleral icterus.    Neck:  Neck supple.  No JVD present.  No bruit.  Cardiovascular: Normal rate, irregularly irregular rhythm, S1 S2+, NO S3 / S4  Pulmonary/Chest:  Vesicular breath sounds B/L, Clear to auscultation, with no added sounds.  Abdominal:  Soft.  Bowel sounds are normal.  No distension and no tenderness.  No organomegaly.  Neurological:  Alert and oriented to person, place, and time. No focal defecits  Skin:  Skin is warm and dry. No rash noted. No pallor.   Musculoskeletal:  No tenderness, and no deformity.  No red or swollen joints anywhere.   Lower extremities: No edema, Peripheral vascular:  2+ Pulses B/L DP.  ----------------------------------------------------------------------------------------------------------------------    ----------------------------------------------------------------------------------------------------------------------  Results from last 7 days   Lab Units 06/15/21  0908 06/15/21  0324 06/15/21  0128   CK TOTAL U/L  --   --  61   TROPONIN T ng/mL <0.010 <0.010 <0.010     Results from last 7 days   Lab Units 06/18/21  0258 06/17/21  1900 06/17/21  0106 06/15/21  0128   CRP mg/dL  --   --   --  <0.30   WBC 10*3/mm3 5.86 6.08 6.32 6.98   HEMOGLOBIN g/dL 13.1 12.2* 12.6* 13.9   HEMATOCRIT % 42.3 38.6 40.4 42.8   MCV fL 99.3* 97.2* 97.8* 94.7   MCHC g/dL 31.0* 31.6 31.2* 32.5   PLATELETS 10*3/mm3 181 194 177 248   INR   --  1.13*  --   --          Results from last 7 days   Lab Units 06/18/21 0258 06/17/21 0106 06/16/21  0557 06/15/21  0128   SODIUM mmol/L 138 141 138 131*   POTASSIUM mmol/L 4.4 4.4 4.6 4.0   MAGNESIUM mg/dL  --  2.0 1.9 1.5*   CHLORIDE mmol/L 107 107 106 94*   CO2 mmol/L 23.3  26.2 26.3 22.8   BUN mg/dL 8 9 6 7   CREATININE mg/dL 0.52* 0.55* 0.55* 0.65*   EGFR IF NONAFRICN AM mL/min/1.73 >150 >150 >150 130   CALCIUM mg/dL 8.5* 8.6 8.9 8.8   GLUCOSE mg/dL 83 99 123* 107*   ALBUMIN g/dL  --   --   --  3.74   BILIRUBIN mg/dL  --   --   --  0.4   ALK PHOS U/L  --   --   --  118*   AST (SGOT) U/L  --   --   --  88*   ALT (SGPT) U/L  --   --   --  48*   Estimated Creatinine Clearance: 266.2 mL/min (A) (by C-G formula based on SCr of 0.52 mg/dL (L)).        Assessment:  1.  Persistent atrial fibrillation with controlled ventricular response with episodes of pauses up to 3.9 seconds  2.  Ischemic cardiomyopathy with left ventricular ejection fraction about 40-45%, compensated  3.  Recent chest pain, resolved no evidence of myocardial ischemia on the Lexiscan sestamibi study with evidence of possible previous anteroapical myocardial infarction.  4.  Type 2 diabetes mellitus  5.  Morbid obesity      Plan:  1.  Discussed with patient regarding cardioversion he said he has been taking his Xarelto for prolonged time without missing any doses so proceed only with direct current cardioversion no need for GEETA  2.  Currently is rate controlled continue current management pending cardioversion  3.  Continue with heparin infusion pending possible pacemaker implantation  4.  He seems to be compensated continue current management      Electronically signed by ALECIA Saenz, 06/18/21, 10:13 AM EDT.      Electronically signed by Waqar Jones MD, 06/18/21, 11:34 AM EDT.

## 2021-06-18 NOTE — ANESTHESIA PREPROCEDURE EVALUATION
Anesthesia Evaluation     Patient summary reviewed and Nursing notes reviewed   NPO Solid Status: > 8 hours  NPO Liquid Status: > 8 hours           Airway   Mallampati: I  TM distance: <3 FB  Neck ROM: limited  no difficulty expected, Anterior and Large neck circumference  Dental    (+) poor dentition    Pulmonary - normal exam   (+) a smoker Current Abstained day of surgery, COPD mild, home oxygen, shortness of breath,   Cardiovascular   Exercise tolerance: good (4-7 METS)    NYHA Classification: II  Patient on routine beta blocker  Rhythm: irregular  Rate: abnormal    (+) hypertension poorly controlled, dysrhythmias Atrial Fib,       Neuro/Psych  (+) seizures well controlled, weakness, psychiatric history Anxiety and Depression,     GI/Hepatic/Renal/Endo    (+) obesity, morbid obesity, GERD well controlled,      Musculoskeletal     (+) back pain, chronic pain,   Abdominal  - normal exam   Substance History - negative use     OB/GYN negative ob/gyn ROS         Other   arthritis,                    Anesthesia Plan    ASA 4       total IV anesthesia  intravenous induction     Anesthetic plan, all risks, benefits, and alternatives have been provided, discussed and informed consent has been obtained with: patient.    Plan discussed with CRNA.

## 2021-06-18 NOTE — CASE MANAGEMENT/SOCIAL WORK
Discharge Planning Assessment  ALTAGRACIA Oneill     Patient Name: Adriano Henry  MRN: [id number]  Today's Date: 6/18/2021    Admit Date: 6/15/2021    Discharge Plan     Row Name 06/18/21 1508       Plan    Plan SS followed up with pt and spouse regarding d/c planning. If needed, they are agreeable to IV abx at home, no preference on HH agency. Pt is able to return home with spouse providing transportation. SS following.    Patient/Family in Agreement with Plan  yes        REG Catherine

## 2021-06-18 NOTE — PLAN OF CARE
Goal Outcome Evaluation:  Plan of Care Reviewed With: patient        Progress: improving  Outcome Summary: Pt currently resting quietly in bed with no complaints noted at this time. Pt was cardioverted today and has since been running in NSR. Per Dr. Fulton pt's heparin drip was restarted afterwards. V/S stable with no signs of respiratory distress present. Will continue to monitor pt and follow plan of care.

## 2021-06-18 NOTE — ANESTHESIA POSTPROCEDURE EVALUATION
Patient: Adriano Henry    Procedure Summary     Date: 06/18/21 Room / Location: [facility]    Anesthesia Start: 1210 Anesthesia Stop: 1228    Procedure: CARDIOVERSION EXTERNAL IN CARDIOLOGY DEPARTMENT Diagnosis: (Persistent atrial fibrillation)    Scheduled Providers: Waqar Jones MD Provider: Leonel Munson MD    Anesthesia Type: MAC ASA Status: 4          Anesthesia Type: MAC    Vitals  Vitals Value Taken Time   /58 06/18/21 1345   Temp 98.2 °F (36.8 °C) 06/18/21 1345   Pulse 86 06/18/21 1345   Resp 18 06/18/21 1345   SpO2 94 % 06/18/21 1345           Post Anesthesia Care and Evaluation    Patient location during evaluation: floor  Patient participation: complete - patient participated  Level of consciousness: awake  Pain score: 0  Pain management: adequate  Airway patency: patent  Anesthetic complications: No anesthetic complications  PONV Status: none  Cardiovascular status: acceptable  Respiratory status: acceptable  Hydration status: acceptable

## 2021-06-18 NOTE — PLAN OF CARE
Goal Outcome Evaluation: Patient lying in bed asleep. Easy to arouse. Heparin drip in place RT without 0 adverse reaction noted. Denies pain/discomfort @ this time.

## 2021-06-18 NOTE — PROGRESS NOTES
[facility] HOSPITALIST PROGRESS NOTE     Patient Identification:  Name:  Adriano Henry  Age:  50 y.o.  Sex:  male  :  1970  MRN:  [id number]  Visit Number:  [id number]  ROOM: [address] [place] [address]     Primary Care Provider:  Radames Melo APRN    Length of stay in inpatient status:  2    Subjective     Chief Compliant:    Chief Complaint   Patient presents with   • Chest Pain   • Shortness of Breath   • Suicidal       History of Presenting Illness:    Patient reports feeling well today. Has not been symptomatic with any of the pauses. Reports he is eating and drinking well with no new complaints. No family bedside.     ROS:  Otherwise 10 point ROS negative other than documented above in HPI.     Objective     Current Hospital Meds:allopurinol, 300 mg, Oral, Daily  ARIPiprazole, 10 mg, Oral, Daily  aspirin, 81 mg, Oral, Daily  busPIRone, 7.5 mg, Oral, Q12H  DULoxetine, 60 mg, Oral, BID  fluticasone, 2 spray, Each Nare, Daily  gabapentin, 400 mg, Oral, TID  heparin (porcine), 5,000 Units, Intravenous, Once  levETIRAcetam, 1,500 mg, Oral, Q12H  multivitamin with minerals, 1 tablet, Oral, Daily  pantoprazole, 40 mg, Oral, QAM  sodium chloride, 3 mL, Intravenous, Q12H  tamsulosin, 0.4 mg, Oral, Daily  thiamine (VITAMIN B1) IVPB, 200 mg, Intravenous, Daily  traZODone, 100 mg, Oral, Nightly  valsartan, 160 mg, Oral, Q24H    heparin (porcine), 5.88 Units/kg/hr        Current Antimicrobial Therapy:  Anti-Infectives (From admission, onward)    None        Current Diuretic Therapy:  Diuretics (From admission, onward)    None        ----------------------------------------------------------------------------------------------------------------------  Vital Signs:  Temp:  [97.3 °F (36.3 °C)-98.2 °F (36.8 °C)] 97.3 °F (36.3 °C)  Heart Rate:  [71-74] 74  Resp:  [20] 20  BP: ()/(56-83) 95/62  SpO2:  [94 %-99 %] 94 %  on  Flow (L/min):  [2] 2;   Device (Oxygen Therapy): nasal cannula  Body mass index is  53.81 kg/m².    Wt Readings from Last 3 Encounters:   06/17/21 (!) 170 kg (375 lb)   02/18/18 (!) 198 kg (436 lb)   09/14/17 (!) 189 kg (417 lb)     Intake & Output (last 3 days)       06/15 0701 - 06/16 0700 06/16 0701 - 06/17 0700 06/17 0701 - 06/18 0700    P.O. 720 960 720    I.V. (mL/kg) 954.7 (5.4)      Total Intake(mL/kg) 1674.7 (9.5) 960 (5.6) 720 (4.2)    Urine (mL/kg/hr) 1350 (0.3) 1150 (0.3)     Total Output 1350 1150     Net +324.7 -190 +720           Urine Unmeasured Occurrence  3 x 6 x        Diet Regular; Cardiac  NPO Diet  ----------------------------------------------------------------------------------------------------------------------  Physical exam:  Constitutional:  Well-developed and well-nourished.  No respiratory distress. Obese.       HENT:  Head:  Normocephalic and atraumatic.  Mouth:  Moist mucous membranes.    Eyes:  Conjunctivae and EOM are normal. No scleral icterus.    Neck:  Neck supple.  No JVD present.    Cardiovascular:  Irregularly irregular rhythm and normal heart sounds with no murmur.  Pulmonary/Chest:  No respiratory distress, no wheezes, no crackles, with normal breath sounds and good air movement.  Abdominal:  Soft.  Bowel sounds are normal.  No distension and no tenderness.   Musculoskeletal:  No edema, no tenderness, and no deformity.  No red or swollen joints anywhere.    Neurological:  Alert and oriented to person, place, and time.  No cranial nerve deficit.  No tongue deviation.  No facial droop.  No slurred speech.   Skin:  Skin is warm and dry. No rash noted. No pallor.   Peripheral vascular:  Pulses in all 4 extremities with no clubbing, no cyanosis, no edema.  ----------------------------------------------------------------------------------------------------------------------  Tele:    ----------------------------------------------------------------------------------------------------------------------  Results from last 7 days   Lab Units 06/17/21  6740  06/17/21 0106 06/16/21  0557 06/15/21  0128   CRP mg/dL  --   --   --  <0.30   WBC 10*3/mm3 6.08 6.32 7.04 6.98   HEMOGLOBIN g/dL 12.2* 12.6* 13.6 13.9   HEMATOCRIT % 38.6 40.4 42.5 42.8   MCV fL 97.2* 97.8* 96.2 94.7   MCHC g/dL 31.6 31.2* 32.0 32.5   PLATELETS 10*3/mm3 194 177 228 248   INR  1.13*  --   --   --          Results from last 7 days   Lab Units 06/17/21 0106 06/16/21  0557 06/15/21  0128   SODIUM mmol/L 141 138 131*   POTASSIUM mmol/L 4.4 4.6 4.0   MAGNESIUM mg/dL 2.0 1.9 1.5*   CHLORIDE mmol/L 107 106 94*   CO2 mmol/L 26.2 26.3 22.8   BUN mg/dL 9 6 7   CREATININE mg/dL 0.55* 0.55* 0.65*   EGFR IF NONAFRICN AM mL/min/1.73 >150 >150 130   CALCIUM mg/dL 8.6 8.9 8.8   GLUCOSE mg/dL 99 123* 107*   ALBUMIN g/dL  --   --  3.74   BILIRUBIN mg/dL  --   --  0.4   ALK PHOS U/L  --   --  118*   AST (SGOT) U/L  --   --  88*   ALT (SGPT) U/L  --   --  48*   Estimated Creatinine Clearance: 251.7 mL/min (A) (by C-G formula based on SCr of 0.55 mg/dL (L)).  No results found for: AMMONIA  Results from last 7 days   Lab Units 06/15/21  0908 06/15/21  0324 06/15/21  0128   CK TOTAL U/L  --   --  61   TROPONIN T ng/mL <0.010 <0.010 <0.010             Glucose   Date/Time Value Ref Range Status   06/17/2021 0611 101 70 - 130 mg/dL Final   06/16/2021 1955 96 70 - 130 mg/dL Final   06/15/2021 1838 129 70 - 130 mg/dL Final     Lab Results   Component Value Date    TSH 0.559 06/15/2021     No results found for: PREGTESTUR, PREGSERUM, HCG, HCGQUANT  Pain Management Panel     Pain Management Panel Latest Ref Rng & Units 6/15/2021    AMPHETAMINES SCREEN, URINE Negative Positive(A)    BARBITURATES SCREEN Negative Negative    BENZODIAZEPINE SCREEN, URINE Negative Negative    BUPRENORPHINEUR Negative Negative    COCAINE SCREEN, URINE Negative Negative    METHADONE SCREEN, URINE Negative Negative        Brief Urine Lab Results  (Last result in the past 365 days)      Color   Clarity   Blood   Leuk Est   Nitrite   Protein   CREAT    Urine HCG        06/15/21 0228 Yellow Clear Negative Trace Negative Negative             No results found for: BLOODCX  No results found for: URINECX  No results found for: WOUNDCX  No results found for: STOOLCX  No results found for: RESPCX  No results found for: AFBCX  Results from last 7 days   Lab Units 06/15/21  0128   CRP mg/dL <0.30       I have personally looked at the labs and they are summarized above.  ----------------------------------------------------------------------------------------------------------------------  Detailed radiology reports for the last 24 hours:    Imaging Results (Last 24 Hours)     ** No results found for the last 24 hours. **        Assessment & Plan    #Afib  #Sick sinus syndrome   - [id number]  - TSH wnl   - Initially in RVR, now with SVR with pauses up to 3.9 seconds on 6/16 and pause up to 3.2 this AM. Asymptomatic.   - Hold all AV lesa blockade.   - If patient continues to have pauses may need pacemaker placement.     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   - Compensated on exam. Heart failure may in part be from tachyarrhythmia or ischemi cardiomyopathy.   - BB held for SVR. Valsartan per cardiology.   - Cardiology planning cardioversion in AM.     #Electrical tazing   #Chest pain   - Troponin negativeX3, EKG without significant ischemic changes, will repeat now RVR has resolved   - Stress test intermediate with no ischemia noted.   - Continue ASA statin    #SI/HI  - Psych consulted. Appreciate recs. Suicide precautions.     #Illicit amphetamine use   - Patient reports he snorts meth  - Psych following     #ETOH abuse   #Mild hepatocellular transaminitis   #Positive hepatitis C antibody   - CIWA  - Outpatient f/u     #Morbid obesity   - Complicates all aspects of care    #Seizure disorder  - Continue home keppra    #Hypomagnesemia  - Replaced    #Hx of  gastric bypass and ayush filter     F: PO  E: Replace as needed   N: Cardiac     Code status: Full     Dispo: Pending clinical improvement         VTE Prophylaxis:   Mechanical Order History:     None      Pharmalogical Order History:      Ordered     Dose Route Frequency Stop    06/17/21 1837  heparin (porcine) 5000 UNIT/ML injection 5,000 Units      5,000 Units IV Once --    06/17/21 1837  heparin 70635 units/250 mL (100 units/mL) in 0.45 % NaCl infusion  9.99 mL/hr      5.88 Units/kg/hr IV Titrated --    06/17/21 1837  heparin (porcine) 5000 UNIT/ML injection 5,000 Units      5,000 Units IV As Needed --    06/17/21 1837  heparin (porcine) 5000 UNIT/ML injection 2,500 Units      2,500 Units IV As Needed --    06/15/21 1425  rivaroxaban (XARELTO) tablet 20 mg  Status:  Discontinued      20 mg PO Nightly 06/17/21 0758                  Suman Fulton MD  [facility]  06/17/21  20:10 EDT

## 2021-06-19 LAB
ANION GAP SERPL CALCULATED.3IONS-SCNC: 5.6 MMOL/L (ref 5–15)
APTT PPP: 44.8 SECONDS (ref 25.5–35.4)
APTT PPP: 45.2 SECONDS (ref 25.5–35.4)
APTT PPP: 67.7 SECONDS (ref 25.5–35.4)
APTT PPP: 93.2 SECONDS (ref 25.5–35.4)
BASOPHILS # BLD AUTO: 0.02 10*3/MM3 (ref 0–0.2)
BASOPHILS NFR BLD AUTO: 0.4 % (ref 0–1.5)
BUN SERPL-MCNC: 7 MG/DL (ref 6–20)
BUN/CREAT SERPL: 13 (ref 7–25)
CALCIUM SPEC-SCNC: 8.3 MG/DL (ref 8.6–10.5)
CHLORIDE SERPL-SCNC: 106 MMOL/L (ref 98–107)
CO2 SERPL-SCNC: 28.4 MMOL/L (ref 22–29)
CREAT SERPL-MCNC: 0.54 MG/DL (ref 0.76–1.27)
DEPRECATED RDW RBC AUTO: 51.6 FL (ref 37–54)
EOSINOPHIL # BLD AUTO: 0.21 10*3/MM3 (ref 0–0.4)
EOSINOPHIL NFR BLD AUTO: 3.9 % (ref 0.3–6.2)
ERYTHROCYTE [DISTWIDTH] IN BLOOD BY AUTOMATED COUNT: 14.8 % (ref 12.3–15.4)
GFR SERPL CREATININE-BSD FRML MDRD: >150 ML/MIN/1.73
GLUCOSE SERPL-MCNC: 124 MG/DL (ref 65–99)
HCT VFR BLD AUTO: 37.4 % (ref 37.5–51)
HGB BLD-MCNC: 11.9 G/DL (ref 13–17.7)
IMM GRANULOCYTES # BLD AUTO: 0.01 10*3/MM3 (ref 0–0.05)
IMM GRANULOCYTES NFR BLD AUTO: 0.2 % (ref 0–0.5)
LYMPHOCYTES # BLD AUTO: 0.89 10*3/MM3 (ref 0.7–3.1)
LYMPHOCYTES NFR BLD AUTO: 16.5 % (ref 19.6–45.3)
MCH RBC QN AUTO: 30.4 PG (ref 26.6–33)
MCHC RBC AUTO-ENTMCNC: 31.8 G/DL (ref 31.5–35.7)
MCV RBC AUTO: 95.7 FL (ref 79–97)
MONOCYTES # BLD AUTO: 0.53 10*3/MM3 (ref 0.1–0.9)
MONOCYTES NFR BLD AUTO: 9.9 % (ref 5–12)
NEUTROPHILS NFR BLD AUTO: 3.72 10*3/MM3 (ref 1.7–7)
NEUTROPHILS NFR BLD AUTO: 69.1 % (ref 42.7–76)
NRBC BLD AUTO-RTO: 0 /100 WBC (ref 0–0.2)
PLATELET # BLD AUTO: 183 10*3/MM3 (ref 140–450)
PMV BLD AUTO: 10.3 FL (ref 6–12)
POTASSIUM SERPL-SCNC: 3.8 MMOL/L (ref 3.5–5.2)
QT INTERVAL: 484 MS
QT INTERVAL: 506 MS
QTC INTERVAL: 507 MS
QTC INTERVAL: 534 MS
RBC # BLD AUTO: 3.91 10*6/MM3 (ref 4.14–5.8)
SODIUM SERPL-SCNC: 140 MMOL/L (ref 136–145)
WBC # BLD AUTO: 5.38 10*3/MM3 (ref 3.4–10.8)

## 2021-06-19 PROCEDURE — 99232 SBSQ HOSP IP/OBS MODERATE 35: CPT | Performed by: SPECIALIST

## 2021-06-19 PROCEDURE — 85025 COMPLETE CBC W/AUTO DIFF WBC: CPT | Performed by: INTERNAL MEDICINE

## 2021-06-19 PROCEDURE — 25010000002 THIAMINE PER 100 MG: Performed by: INTERNAL MEDICINE

## 2021-06-19 PROCEDURE — 99232 SBSQ HOSP IP/OBS MODERATE 35: CPT | Performed by: INTERNAL MEDICINE

## 2021-06-19 PROCEDURE — 25010000002 HEPARIN (PORCINE) PER 1000 UNITS: Performed by: INTERNAL MEDICINE

## 2021-06-19 PROCEDURE — 94799 UNLISTED PULMONARY SVC/PX: CPT

## 2021-06-19 PROCEDURE — 93005 ELECTROCARDIOGRAM TRACING: CPT | Performed by: INTERNAL MEDICINE

## 2021-06-19 PROCEDURE — 85730 THROMBOPLASTIN TIME PARTIAL: CPT | Performed by: INTERNAL MEDICINE

## 2021-06-19 PROCEDURE — 80048 BASIC METABOLIC PNL TOTAL CA: CPT | Performed by: INTERNAL MEDICINE

## 2021-06-19 RX ORDER — SOTALOL HYDROCHLORIDE 80 MG/1
80 TABLET ORAL EVERY 12 HOURS SCHEDULED
Status: DISCONTINUED | OUTPATIENT
Start: 2021-06-19 | End: 2021-06-21 | Stop reason: HOSPADM

## 2021-06-19 RX ORDER — ACETAMINOPHEN 325 MG/1
650 TABLET ORAL EVERY 6 HOURS PRN
Status: DISCONTINUED | OUTPATIENT
Start: 2021-06-19 | End: 2021-06-21 | Stop reason: HOSPADM

## 2021-06-19 RX ADMIN — TAMSULOSIN HYDROCHLORIDE 0.4 MG: 0.4 CAPSULE ORAL at 09:41

## 2021-06-19 RX ADMIN — TRAZODONE HYDROCHLORIDE 100 MG: 50 TABLET ORAL at 21:19

## 2021-06-19 RX ADMIN — HEPARIN SODIUM 11.88 UNITS/KG/HR: 10000 INJECTION, SOLUTION INTRAVENOUS at 13:57

## 2021-06-19 RX ADMIN — SODIUM CHLORIDE, PRESERVATIVE FREE 3 ML: 5 INJECTION INTRAVENOUS at 21:21

## 2021-06-19 RX ADMIN — BUSPIRONE HYDROCHLORIDE 7.5 MG: 10 TABLET ORAL at 09:41

## 2021-06-19 RX ADMIN — BUSPIRONE HYDROCHLORIDE 7.5 MG: 10 TABLET ORAL at 21:19

## 2021-06-19 RX ADMIN — LEVETIRACETAM 1500 MG: 500 TABLET, FILM COATED ORAL at 09:41

## 2021-06-19 RX ADMIN — Medication 1 TABLET: at 09:42

## 2021-06-19 RX ADMIN — GABAPENTIN 400 MG: 400 CAPSULE ORAL at 09:41

## 2021-06-19 RX ADMIN — DULOXETINE HYDROCHLORIDE 60 MG: 60 CAPSULE, DELAYED RELEASE ORAL at 21:19

## 2021-06-19 RX ADMIN — HEPARIN SODIUM 2500 UNITS: 5000 INJECTION INTRAVENOUS; SUBCUTANEOUS at 01:15

## 2021-06-19 RX ADMIN — DULOXETINE HYDROCHLORIDE 60 MG: 60 CAPSULE, DELAYED RELEASE ORAL at 09:41

## 2021-06-19 RX ADMIN — SACUBITRIL AND VALSARTAN 1 TABLET: 24; 26 TABLET, FILM COATED ORAL at 13:59

## 2021-06-19 RX ADMIN — GABAPENTIN 400 MG: 400 CAPSULE ORAL at 21:19

## 2021-06-19 RX ADMIN — LEVETIRACETAM 1500 MG: 500 TABLET, FILM COATED ORAL at 21:19

## 2021-06-19 RX ADMIN — FLUTICASONE PROPIONATE 2 SPRAY: 50 SPRAY, METERED NASAL at 09:40

## 2021-06-19 RX ADMIN — OXYCODONE HYDROCHLORIDE 15 MG: 15 TABLET ORAL at 03:19

## 2021-06-19 RX ADMIN — NYSTATIN 1 APPLICATION: 100000 POWDER TOPICAL at 09:40

## 2021-06-19 RX ADMIN — GABAPENTIN 400 MG: 400 CAPSULE ORAL at 16:50

## 2021-06-19 RX ADMIN — SODIUM CHLORIDE, PRESERVATIVE FREE 3 ML: 5 INJECTION INTRAVENOUS at 09:42

## 2021-06-19 RX ADMIN — THIAMINE HYDROCHLORIDE 200 MG: 100 INJECTION, SOLUTION INTRAMUSCULAR; INTRAVENOUS at 09:42

## 2021-06-19 RX ADMIN — ACETAMINOPHEN 650 MG: 325 TABLET ORAL at 21:18

## 2021-06-19 RX ADMIN — SOTALOL HYDROCHLORIDE 80 MG: 80 TABLET ORAL at 21:22

## 2021-06-19 RX ADMIN — NYSTATIN: 100000 POWDER TOPICAL at 21:20

## 2021-06-19 RX ADMIN — SACUBITRIL AND VALSARTAN 1 TABLET: 24; 26 TABLET, FILM COATED ORAL at 21:20

## 2021-06-19 RX ADMIN — OXYCODONE HYDROCHLORIDE 15 MG: 15 TABLET ORAL at 16:50

## 2021-06-19 RX ADMIN — ACETAMINOPHEN 650 MG: 325 TABLET ORAL at 02:21

## 2021-06-19 RX ADMIN — ASPIRIN 81 MG: 81 TABLET, COATED ORAL at 09:41

## 2021-06-19 RX ADMIN — PANTOPRAZOLE SODIUM 40 MG: 40 TABLET, DELAYED RELEASE ORAL at 05:13

## 2021-06-19 RX ADMIN — OXYCODONE HYDROCHLORIDE 15 MG: 15 TABLET ORAL at 09:41

## 2021-06-19 RX ADMIN — HEPARIN SODIUM 2500 UNITS: 5000 INJECTION INTRAVENOUS; SUBCUTANEOUS at 08:52

## 2021-06-19 RX ADMIN — ALLOPURINOL 300 MG: 300 TABLET ORAL at 09:41

## 2021-06-19 RX ADMIN — ARIPIPRAZOLE 10 MG: 10 TABLET ORAL at 09:41

## 2021-06-19 RX ADMIN — SOTALOL HYDROCHLORIDE 80 MG: 80 TABLET ORAL at 13:58

## 2021-06-19 RX ADMIN — VALSARTAN 160 MG: 160 TABLET, FILM COATED ORAL at 09:42

## 2021-06-19 NOTE — PROGRESS NOTES
[facility] HOSPITALIST PROGRESS NOTE     Patient Identification:  Name:  Adriano Henry  Age:  50 y.o.  Sex:  male  :  1970  MRN:  [id number]  Visit Number:  [id number]  ROOM: [address] [place] [address]     Primary Care Provider:  Radames Melo APRN    Length of stay in inpatient status:  3    Subjective     Chief Compliant:    Chief Complaint   Patient presents with   • Chest Pain   • Shortness of Breath   • Suicidal       History of Presenting Illness:    Patient cardioverted today and now in sinus rhythm. Denied complaints other than chronic pain.     ROS:  Otherwise 10 point ROS negative other than documented above in HPI.     Objective     Current Hospital Meds:allopurinol, 300 mg, Oral, Daily  ARIPiprazole, 10 mg, Oral, Daily  aspirin, 81 mg, Oral, Daily  busPIRone, 7.5 mg, Oral, Q12H  DULoxetine, 60 mg, Oral, BID  fluticasone, 2 spray, Each Nare, Daily  gabapentin, 400 mg, Oral, TID  levETIRAcetam, 1,500 mg, Oral, Q12H  multivitamin with minerals, 1 tablet, Oral, Daily  nystatin, , Topical, Q12H  pantoprazole, 40 mg, Oral, QAM  sodium chloride, 3 mL, Intravenous, Q12H  tamsulosin, 0.4 mg, Oral, Daily  thiamine (VITAMIN B1) IVPB, 200 mg, Intravenous, Daily  traZODone, 100 mg, Oral, Nightly  valsartan, 160 mg, Oral, Q24H    heparin (porcine), 5.88 Units/kg/hr, Last Rate: 5.88 Units/kg/hr (21 1803)        Current Antimicrobial Therapy:  Anti-Infectives (From admission, onward)    None        Current Diuretic Therapy:  Diuretics (From admission, onward)    None        ----------------------------------------------------------------------------------------------------------------------  Vital Signs:  Temp:  [97.7 °F (36.5 °C)-98.2 °F (36.8 °C)] 98 °F (36.7 °C)  Heart Rate:  [59-96] 85  Resp:  [18-20] 18  BP: (114-138)/() 114/59  SpO2:  [94 %-99 %] 98 %  on  Flow (L/min):  [2] 2;   Device (Oxygen Therapy): nasal cannula  Body mass index is 53.86 kg/m².    Wt Readings from Last 3  Encounters:   06/18/21 (!) 170 kg (375 lb 6.4 oz)   02/18/18 (!) 198 kg (436 lb)   09/14/17 (!) 189 kg (417 lb)     Intake & Output (last 3 days)       06/16 0701 - 06/17 0700 06/17 0701 - 06/18 0700 06/18 0701 - 06/19 0700    P.O. 960 720 360    I.V. (mL/kg)   50 (0.3)    Total Intake(mL/kg) 960 (5.6) 720 (4.2) 410 (2.4)    Urine (mL/kg/hr) 1150 (0.3)  950 (0.4)    Total Output 1150  950    Net -190 +720 -540           Urine Unmeasured Occurrence 3 x 9 x         Diet Regular; Cardiac  ----------------------------------------------------------------------------------------------------------------------  Physical exam:  Constitutional:  Well-developed and well-nourished.  No respiratory distress. Obese.      HENT:  Head:  Normocephalic and atraumatic.  Mouth:  Moist mucous membranes.    Eyes:  Conjunctivae and EOM are normal. No scleral icterus.    Neck:  Neck supple.  No JVD present.    Cardiovascular:  Normal rate, regular rhythm and normal heart sounds with no murmur.  Pulmonary/Chest:  No respiratory distress, no wheezes, no crackles, with normal breath sounds and good air movement.  Abdominal:  Soft.  Bowel sounds are normal.  No distension and no tenderness.   Musculoskeletal:  No edema, no tenderness, and no deformity.  No red or swollen joints anywhere.    Neurological:  Alert and oriented to person, place, and time.  No cranial nerve deficit.  No tongue deviation.  No facial droop.  No slurred speech.   Skin:  Skin is warm and dry. No rash noted. No pallor.   Peripheral vascular:  Pulses in all 4 extremities with no clubbing, no cyanosis, no edema.  ----------------------------------------------------------------------------------------------------------------------  Tele:    ----------------------------------------------------------------------------------------------------------------------  Results from last 7 days   Lab Units 06/18/21  0258 06/17/21  1900 06/17/21  0106 06/15/21  0128   CRP mg/dL  --    --   --  <0.30   WBC 10*3/mm3 5.86 6.08 6.32 6.98   HEMOGLOBIN g/dL 13.1 12.2* 12.6* 13.9   HEMATOCRIT % 42.3 38.6 40.4 42.8   MCV fL 99.3* 97.2* 97.8* 94.7   MCHC g/dL 31.0* 31.6 31.2* 32.5   PLATELETS 10*3/mm3 181 194 177 248   INR   --  1.13*  --   --          Results from last 7 days   Lab Units 06/18/21  0258 06/17/21  0106 06/16/21  0557 06/15/21  0128   SODIUM mmol/L 138 141 138 131*   POTASSIUM mmol/L 4.4 4.4 4.6 4.0   MAGNESIUM mg/dL  --  2.0 1.9 1.5*   CHLORIDE mmol/L 107 107 106 94*   CO2 mmol/L 23.3 26.2 26.3 22.8   BUN mg/dL 8 9 6 7   CREATININE mg/dL 0.52* 0.55* 0.55* 0.65*   EGFR IF NONAFRICN AM mL/min/1.73 >150 >150 >150 130   CALCIUM mg/dL 8.5* 8.6 8.9 8.8   GLUCOSE mg/dL 83 99 123* 107*   ALBUMIN g/dL  --   --   --  3.74   BILIRUBIN mg/dL  --   --   --  0.4   ALK PHOS U/L  --   --   --  118*   AST (SGOT) U/L  --   --   --  88*   ALT (SGPT) U/L  --   --   --  48*   Estimated Creatinine Clearance: 266.2 mL/min (A) (by C-G formula based on SCr of 0.52 mg/dL (L)).  No results found for: AMMONIA  Results from last 7 days   Lab Units 06/15/21  0908 06/15/21  0324 06/15/21  0128   CK TOTAL U/L  --   --  61   TROPONIN T ng/mL <0.010 <0.010 <0.010             Glucose   Date/Time Value Ref Range Status   06/18/2021 1033 100 70 - 130 mg/dL Final   06/17/2021 0611 101 70 - 130 mg/dL Final   06/16/2021/16/2021 1955 96 70 - 130 mg/dL Final     Lab Results   Component Value Date    TSH 0.559 06/15/2021     No results found for: PREGTESTUR, PREGSERUM, HCG, HCGQUANT  Pain Management Panel     Pain Management Panel Latest Ref Rng & Units 6/15/2021    AMPHETAMINES SCREEN, URINE Negative Positive(A)    BARBITURATES SCREEN Negative Negative    BENZODIAZEPINE SCREEN, URINE Negative Negative    BUPRENORPHINEUR Negative Negative    COCAINE SCREEN, URINE Negative Negative    METHADONE SCREEN, URINE Negative Negative        Brief Urine Lab Results  (Last result in the past 365 days)      Color   Clarity   Blood   Leuk Est   Nitrite    Protein   CREAT   Urine HCG        06/15/21 0228 Yellow Clear Negative Trace Negative Negative             No results found for: BLOODCX  No results found for: URINECX  No results found for: WOUNDCX  No results found for: STOOLCX  No results found for: RESPCX  No results found for: AFBCX  Results from last 7 days   Lab Units 06/15/21  0128   CRP mg/dL <0.30       I have personally looked at the labs and they are summarized above.  ----------------------------------------------------------------------------------------------------------------------  Detailed radiology reports for the last 24 hours:    Imaging Results (Last 24 Hours)     ** No results found for the last 24 hours. **        Assessment & Plan    #Afib  #Sick sinus syndrome   - [id number]  - TSH wnl   - Initially in RVR, now with SVR with pauses up to 3.9 seconds on 6/16 and pause up to 3.2 this AM. Asymptomatic.   - Hold all AV lesa blockade.   - Cardiology cardioverted on 6/18, will monitor for bradycardia and pauses.     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   - Compensated on exam. Heart failure may in part be from tachyarrhythmia or ischemi cardiomyopathy.   - BB held for SVR. Valsartan per cardiology.     #Electrical tazing   #Chest pain   - Troponin negativeX3, EKG without significant ischemic changes, will repeat now RVR has resolved   - Stress test intermediate with no ischemia noted.   - Continue ASA statin    #SI/HI  - Psych consulted. Appreciate recs. Suicide precautions.     #Illicit amphetamine use   - Patient reports he snorts meth  - Psych following     #ETOH abuse   #Mild hepatocellular transaminitis   #Positive hepatitis C antibody   - [facility]  - Outpatient f/u     #Morbid obesity   - Complicates all aspects of care    #Seizure disorder  - Continue home keppra    #Hypomagnesemia  - Replaced    #Hx of gastric bypass and  ayush filter     F: PO  E: Replace as needed   N: Cardiac     Code status: Full     Dispo: Pending clinical improvement       VTE Prophylaxis:   Mechanical Order History:     None      Pharmalogical Order History:      Ordered     Dose Route Frequency Stop    06/17/21 1837  heparin (porcine) 5000 UNIT/ML injection 5,000 Units      5,000 Units IV Once 06/17/21 2051 06/17/21 1837  heparin 60004 units/250 mL (100 units/mL) in 0.45 % NaCl infusion  9.99 mL/hr      5.88 Units/kg/hr IV Titrated --    06/17/21 1837  heparin (porcine) 5000 UNIT/ML injection 5,000 Units      5,000 Units IV As Needed --    06/17/21 1837  heparin (porcine) 5000 UNIT/ML injection 2,500 Units      2,500 Units IV As Needed --    06/15/21 1425  rivaroxaban (XARELTO) tablet 20 mg  Status:  Discontinued      20 mg PO Nightly 06/17/21 0758                Suman Fulton MD  [facility]  06/18/21  20:01 EDT

## 2021-06-19 NOTE — PROGRESS NOTES
[facility] HOSPITALIST PROGRESS NOTE     Patient Identification:  Name:  Adriano Henry  Age:  50 y.o.  Sex:  male  :  1970  MRN:  [id number]  Visit Number:  [id number]  ROOM: [address] [place] [address]     Primary Care Provider:  Radames Melo APRN    Length of stay in inpatient status:  4    Subjective     Chief Compliant:    Chief Complaint   Patient presents with   • Chest Pain   • Shortness of Breath   • Suicidal       History of Presenting Illness:    Patient remains in sinus rhythm. No new complaints. Is hopeful to get to go home soon.     ROS:  Otherwise 10 point ROS negative other than documented above in HPI.     Objective     Current Hospital Meds:allopurinol, 300 mg, Oral, Daily  ARIPiprazole, 10 mg, Oral, Daily  aspirin, 81 mg, Oral, Daily  busPIRone, 7.5 mg, Oral, Q12H  DULoxetine, 60 mg, Oral, BID  fluticasone, 2 spray, Each Nare, Daily  gabapentin, 400 mg, Oral, TID  levETIRAcetam, 1,500 mg, Oral, Q12H  multivitamin with minerals, 1 tablet, Oral, Daily  nystatin, , Topical, Q12H  pantoprazole, 40 mg, Oral, QAM  sacubitril-valsartan, 1 tablet, Oral, Q12H  sodium chloride, 3 mL, Intravenous, Q12H  sotalol, 80 mg, Oral, Q12H  tamsulosin, 0.4 mg, Oral, Daily  thiamine (VITAMIN B1) IVPB, 200 mg, Intravenous, Daily  traZODone, 100 mg, Oral, Nightly    heparin (porcine), 5.88 Units/kg/hr, Last Rate: 9.88 Units/kg/hr (21 4651)  Pharmacy Consult,         Current Antimicrobial Therapy:  Anti-Infectives (From admission, onward)    None        Current Diuretic Therapy:  Diuretics (From admission, onward)    None        ----------------------------------------------------------------------------------------------------------------------  Vital Signs:  Temp:  [97.4 °F (36.3 °C)-98.1 °F (36.7 °C)] 97.9 °F (36.6 °C)  Heart Rate:  [63-92] 65  Resp:  [18-20] 20  BP: ()/(51-76) 92/53  SpO2:  [95 %-98 %] 96 %  on  Flow (L/min):  [2] 2;   Device (Oxygen Therapy): nasal cannula  Body mass  index is 51.74 kg/m².    Wt Readings from Last 3 Encounters:   06/19/21 (!) 164 kg (360 lb 9.6 oz)   02/18/18 (!) 198 kg (436 lb)   09/14/17 (!) 189 kg (417 lb)     Intake & Output (last 3 days)       06/16 0701 - 06/17 0700 06/17 0701 - 06/18 0700 06/18 0701 - 06/19 0700 06/19 0701 - 06/20 0700    P.O. 960     I.V. (mL/kg)   50 (0.3)     Total Intake(mL/kg) 960 (5.6) 720 (4.2) 890 (5.4) 1320 (8)    Urine (mL/kg/hr) 1150 (0.3)  1850 (0.5) 175 (0.1)    Total Output 1150  1850 175    Net -190 +720 -960 +1145            Urine Unmeasured Occurrence 3 x 9 x          Diet Regular; Cardiac  ----------------------------------------------------------------------------------------------------------------------  Physical exam:  Constitutional:  Well-developed and well-nourished.  No respiratory distress. Obese.       HENT:  Head:  Normocephalic and atraumatic.  Mouth:  Moist mucous membranes.    Eyes:  Conjunctivae and EOM are normal. No scleral icterus.    Neck:  Neck supple.  No JVD present.    Cardiovascular:  Normal rate, regular rhythm and normal heart sounds with no murmur.  Pulmonary/Chest:  No respiratory distress, no wheezes, no crackles, with normal breath sounds and good air movement.  Abdominal:  Soft.  Bowel sounds are normal.  No distension and no tenderness.   Musculoskeletal:  No edema, no tenderness, and no deformity.  No red or swollen joints anywhere.    Neurological:  Alert and oriented to person, place, and time.  No cranial nerve deficit.  No tongue deviation.  No facial droop.  No slurred speech.   Skin:  Skin is warm and dry. No rash noted. No pallor.   Peripheral vascular:  Pulses in all 4 extremities with no clubbing, no cyanosis, no edema.  ----------------------------------------------------------------------------------------------------------------------  Tele:     ----------------------------------------------------------------------------------------------------------------------  Results from last 7 days   Lab Units 06/19/21  0026 06/18/21  0258 06/17/21  1900 06/15/21  0128   CRP mg/dL  --   --   --  <0.30   WBC 10*3/mm3 5.38 5.86 6.08 6.98   HEMOGLOBIN g/dL 11.9* 13.1 12.2* 13.9   HEMATOCRIT % 37.4* 42.3 38.6 42.8   MCV fL 95.7 99.3* 97.2* 94.7   MCHC g/dL 31.8 31.0* 31.6 32.5   PLATELETS 10*3/mm3 183 181 194 248   INR   --   --  1.13*  --          Results from last 7 days   Lab Units 06/19/21  0026 06/18/21 0258 06/17/21  0106 06/16/21  0557 06/15/21  0128   SODIUM mmol/L 140 138 141 138 131*   POTASSIUM mmol/L 3.8 4.4 4.4 4.6 4.0   MAGNESIUM mg/dL  --   --  2.0 1.9 1.5*   CHLORIDE mmol/L 106 107 107 106 94*   CO2 mmol/L 28.4 23.3 26.2 26.3 22.8   BUN mg/dL 7 8 9 6 7   CREATININE mg/dL 0.54* 0.52* 0.55* 0.55* 0.65*   EGFR IF NONAFRICN AM mL/min/1.73 >150 >150 >150 >150 130   CALCIUM mg/dL 8.3* 8.5* 8.6 8.9 8.8   GLUCOSE mg/dL 124* 83 99 123* 107*   ALBUMIN g/dL  --   --   --   --  3.74   BILIRUBIN mg/dL  --   --   --   --  0.4   ALK PHOS U/L  --   --   --   --  118*   AST (SGOT) U/L  --   --   --   --  88*   ALT (SGPT) U/L  --   --   --   --  48*   Estimated Creatinine Clearance: 249.5 mL/min (A) (by C-G formula based on SCr of 0.54 mg/dL (L)).  No results found for: AMMONIA  Results from last 7 days   Lab Units 06/15/21  0908 06/15/21  0324 06/15/21  0128   CK TOTAL U/L  --   --  61   TROPONIN T ng/mL <0.010 <0.010 <0.010             Glucose   Date/Time Value Ref Range Status   06/18/2021 1033 100 70 - 130 mg/dL Final   06/17/2021 0611 101 70 - 130 mg/dL Final   06/16/2021 1955 96 70 - 130 mg/dL Final     Lab Results   Component Value Date    TSH 0.559 06/15/2021     No results found for: PREGTESTUR, PREGSERUM, HCG, HCGQUANT  Pain Management Panel     Pain Management Panel Latest Ref Rng & Units 6/15/2021    AMPHETAMINES SCREEN, URINE Negative Positive(A)     BARBITURATES SCREEN Negative Negative    BENZODIAZEPINE SCREEN, URINE Negative Negative    BUPRENORPHINEUR Negative Negative    COCAINE SCREEN, URINE Negative Negative    METHADONE SCREEN, URINE Negative Negative        Brief Urine Lab Results  (Last result in the past 365 days)      Color   Clarity   Blood   Leuk Est   Nitrite   Protein   CREAT   Urine HCG        06/15/21 0228 Yellow Clear Negative Trace Negative Negative             No results found for: BLOODCX  No results found for: URINECX  No results found for: WOUNDCX  No results found for: STOOLCX  No results found for: RESPCX  No results found for: AFBCX  Results from last 7 days   Lab Units 06/15/21  0128   CRP mg/dL <0.30       I have personally looked at the labs and they are summarized above.  ----------------------------------------------------------------------------------------------------------------------  Detailed radiology reports for the last 24 hours:    Imaging Results (Last 24 Hours)     ** No results found for the last 24 hours. **        Assessment & Plan    #Afib  #Sick sinus syndrome   - [id number] 1  - TSH wnl   - Initially in RVR, now with SVR with pauses up to 3.9 seconds on 6/16. Asymptomatic.   - Hold all AV lesa blockade.   - Cardiology cardioverted on 6/18, will monitor for bradycardia and pauses.  - Maintaining sinus rhythm. HR overnight down to 40's, asymptomatic, with no significant pauses, will continue to monitor on tele.     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   - Compensated on exam. Heart failure may in part be from tachyarrhythmia or ischemi cardiomyopathy.   - BB held for SVR. Entresto per cardiology.     #Electrical tazing   #Chest pain   - Troponin negativeX3, EKG without significant ischemic changes, will repeat now RVR has resolved   - Stress test intermediate with no ischemia noted.   - Continue ASA  statin    #SI/HI  - Psych consulted. Appreciate recs. Suicide precautions.     #Illicit amphetamine use   - Patient reports he snorts meth  - Psych following     #ETOH abuse   #Mild hepatocellular transaminitis   #Positive hepatitis C antibody   - [facility]  - Outpatient f/u     #Morbid obesity   - Complicates all aspects of care    #Seizure disorder  - Continue home keppra    #Hypomagnesemia  - Replaced    #Gout  - Continue allopurinol     #Hx of gastric bypass and ayush filter     F: PO  E: Replace as needed   N: Cardiac     Code status: Full     Dispo: Pending clinical improvement       VTE Prophylaxis:   Mechanical Order History:     None      Pharmalogical Order History:      Ordered     Dose Route Frequency Stop    06/17/21 1837  heparin (porcine) 5000 UNIT/ML injection 5,000 Units      5,000 Units IV Once 06/17/21 2051 06/17/21 1837  heparin 70640 units/250 mL (100 units/mL) in 0.45 % NaCl infusion  9.99 mL/hr      5.88 Units/kg/hr IV Titrated --    06/17/21 1837  heparin (porcine) 5000 UNIT/ML injection 5,000 Units      5,000 Units IV As Needed --    06/17/21 1837  heparin (porcine) 5000 UNIT/ML injection 2,500 Units      2,500 Units IV As Needed --    06/15/21 1425  rivaroxaban (XARELTO) tablet 20 mg  Status:  Discontinued      20 mg PO Nightly 06/17/21 0758                    Suman Fulton MD  [facility]  06/19/21  18:18 EDT

## 2021-06-19 NOTE — PLAN OF CARE
Goal Outcome Evaluation:  Plan of Care Reviewed With: patient        Progress: improving  Outcome Summary: Pt currently resting quietly in bed with no complaints noted at this time. Pt encouraged to ambulate more in room and in halls today; has declined thus far. Will continue to encourage pt to be more active. V/S stable with no signs or respiratory distress noted. Pt's heart rhythm continues to remain in SR. Will continue to monitor pt and follow plan of care.

## 2021-06-19 NOTE — PROGRESS NOTES
Patient Identification:  Name:  Adriano Henry  Age:  50 y.o.  Sex:  male  :  1970  MRN:  [id number]  Visit Number:  [id number]    Chief Complaint:   Atrial fibrillation with RVR, chest pain    Subjective:  The patient underwent cardioversion yesterday, converted to normal sinus rhythm. He has been maintaining sinus rhythm overnight with no pauses noted on telemetry. He is on heparin gtt for possible Permanent pacemaker implantation Monday, 21. He states he is feeling well. Denies any chest pains or shortness of breath.  ----------------------------------------------------------------------------------------------------------------------  Current Hospital Meds:  allopurinol, 300 mg, Oral, Daily  ARIPiprazole, 10 mg, Oral, Daily  aspirin, 81 mg, Oral, Daily  busPIRone, 7.5 mg, Oral, Q12H  DULoxetine, 60 mg, Oral, BID  fluticasone, 2 spray, Each Nare, Daily  gabapentin, 400 mg, Oral, TID  levETIRAcetam, 1,500 mg, Oral, Q12H  multivitamin with minerals, 1 tablet, Oral, Daily  nystatin, , Topical, Q12H  pantoprazole, 40 mg, Oral, QAM  sodium chloride, 3 mL, Intravenous, Q12H  tamsulosin, 0.4 mg, Oral, Daily  thiamine (VITAMIN B1) IVPB, 200 mg, Intravenous, Daily  traZODone, 100 mg, Oral, Nightly  valsartan, 160 mg, Oral, Q24H      heparin (porcine), 5.88 Units/kg/hr, Last Rate: 11.88 Units/kg/hr (21 0852)      ----------------------------------------------------------------------------------------------------------------------  Vital Signs:  Temp:  [97.4 °F (36.3 °C)-98.2 °F (36.8 °C)] 98.1 °F (36.7 °C)  Heart Rate:  [60-96] 77  Resp:  [18-20] 18  BP: (101-138)/() 101/51      21  0250 21  0500 21  0500   Weight: (!) 170 kg (375 lb) (!) 170 kg (375 lb 6.4 oz) (!) 164 kg (360 lb 9.6 oz)     Body mass index is 51.74 kg/m².    Intake/Output Summary (Last 24 hours) at 2021 0945  Last data filed at 2021 0851  Gross per 24 hour   Intake 1370 ml   Output 1500 ml   Net  -130 ml     Diet Regular; Cardiac  ----------------------------------------------------------------------------------------------------------------------  Physical exam:    HEENT:  Head:  Normocephalic and atraumatic.     Eyes:  Conjunctivae and EOM are normal.  Pupils are equal, round, and reactive to light.  No scleral icterus.    Neck:  Neck supple.  No JVD present.  No bruit.  Cardiovascular: Normal rate, regular rhythm, S1 S2+, NO S3 / S4  Pulmonary/Chest:  Vesicular breath sounds B/L, Clear to auscultation, with no added sounds.  Abdominal:  Soft.  Bowel sounds are normal.  No distension and no tenderness.  No organomegaly.  Neurological:  Alert and oriented to person, place, and time. No focal defecits  Skin:  Skin is warm and dry. No rash noted. No pallor.   Musculoskeletal:  No tenderness, and no deformity.  No red or swollen joints anywhere.   Lower extremities: No edema, Peripheral vascular:  2+ Pulses B/L DP.  ----------------------------------------------------------------------------------------------------------------------    ----------------------------------------------------------------------------------------------------------------------  Results from last 7 days   Lab Units 06/15/21  0908 06/15/21  0324 06/15/21  0128   CK TOTAL U/L  --   --  61   TROPONIN T ng/mL <0.010 <0.010 <0.010     Results from last 7 days   Lab Units 06/19/21  0026 06/18/21  0258 06/17/21  1900 06/15/21  0128   CRP mg/dL  --   --   --  <0.30   WBC 10*3/mm3 5.38 5.86 6.08 6.98   HEMOGLOBIN g/dL 11.9* 13.1 12.2* 13.9   HEMATOCRIT % 37.4* 42.3 38.6 42.8   MCV fL 95.7 99.3* 97.2* 94.7   MCHC g/dL 31.8 31.0* 31.6 32.5   PLATELETS 10*3/mm3 183 181 194 248   INR   --   --  1.13*  --          Results from last 7 days   Lab Units 06/19/21  0026 06/18/21  0258 06/17/21  0106 06/16/21  0557 06/15/21  0128   SODIUM mmol/L 140 138 141 138 131*   POTASSIUM mmol/L 3.8 4.4 4.4 4.6 4.0   MAGNESIUM mg/dL  --   --  2.0 1.9 1.5*   CHLORIDE  mmol/L 106 107 107 106 94*   CO2 mmol/L 28.4 23.3 26.2 26.3 22.8   BUN mg/dL 7 8 9 6 7   CREATININE mg/dL 0.54* 0.52* 0.55* 0.55* 0.65*   EGFR IF NONAFRICN AM mL/min/1.73 >150 >150 >150 >150 130   CALCIUM mg/dL 8.3* 8.5* 8.6 8.9 8.8   GLUCOSE mg/dL 124* 83 99 123* 107*   ALBUMIN g/dL  --   --   --   --  3.74   BILIRUBIN mg/dL  --   --   --   --  0.4   ALK PHOS U/L  --   --   --   --  118*   AST (SGOT) U/L  --   --   --   --  88*   ALT (SGPT) U/L  --   --   --   --  48*   Estimated Creatinine Clearance: 249.5 mL/min (A) (by C-G formula based on SCr of 0.54 mg/dL (L)).    No results found for: AMMONIA      No results found for: BLOODCX  No results found for: URINECX  No results found for: WOUNDCX  No results found for: STOOLCX    I have personally looked at the labs and they are summarized above.  ----------------------------------------------------------------------------------------------------------------------  Imaging Results (Last 24 Hours)     ** No results found for the last 24 hours. **        ----------------------------------------------------------------------------------------------------------------------    Assessment:  1.  Persistent atrial fibrillation with pauses up to 3.9 seconds status post direct-current cardioversion patient is maintaining normal sinus rhythm with no further pauses  2.  Ischemic cardiomyopathy with left ventricular ejection fraction 45%  3.  Recent chest pain with a stress test negative for ischemia with possible previous anteroapical infarction  4.  Type 2 diabetes mellitus  5.  Morbid obesity    Plan:  1.  Patient is maintaining normal sinus rhythm with no further pauses considering starting patient on sotalol to maintain normal sinus rhythm however will monitor for now for any further pauses  2.  Continue with heparin for now as patient may require pacing if he has any more pauses  3.  We will switch losartan to Entresto will get pharmacy consultation for cost of Entresto  4.   Patient advised to abstain from alcohol as this can also caused atrial fibrillation recurrence  5.  Advised to lose weight    Electronically signed by ALECIA Saenz, 06/19/21, 11:40 AM EDT.  Electronically signed by Waqar Jones MD, 06/19/21, 11:53 AM EDT.

## 2021-06-19 NOTE — PLAN OF CARE
Goal Outcome Evaluation:  Plan of Care Reviewed With: patient resting in bed at this time with no acute distress noted. Patient has had complaints of back pain this shift with PRN medication given and tolerable pain level noted. Will continue to monitor and follow plan of care with interventions implemented as needed.           Progress: improving

## 2021-06-20 LAB
APTT PPP: 42.5 SECONDS (ref 25.5–35.4)
APTT PPP: 52.2 SECONDS (ref 25.5–35.4)
APTT PPP: 89.8 SECONDS (ref 25.5–35.4)
BASOPHILS # BLD AUTO: 0.03 10*3/MM3 (ref 0–0.2)
BASOPHILS NFR BLD AUTO: 0.5 % (ref 0–1.5)
DEPRECATED RDW RBC AUTO: 52.8 FL (ref 37–54)
EOSINOPHIL # BLD AUTO: 0.21 10*3/MM3 (ref 0–0.4)
EOSINOPHIL NFR BLD AUTO: 3.7 % (ref 0.3–6.2)
ERYTHROCYTE [DISTWIDTH] IN BLOOD BY AUTOMATED COUNT: 14.8 % (ref 12.3–15.4)
HCT VFR BLD AUTO: 40.2 % (ref 37.5–51)
HGB BLD-MCNC: 12.6 G/DL (ref 13–17.7)
IMM GRANULOCYTES # BLD AUTO: 0.01 10*3/MM3 (ref 0–0.05)
IMM GRANULOCYTES NFR BLD AUTO: 0.2 % (ref 0–0.5)
LYMPHOCYTES # BLD AUTO: 0.88 10*3/MM3 (ref 0.7–3.1)
LYMPHOCYTES NFR BLD AUTO: 15.7 % (ref 19.6–45.3)
MCH RBC QN AUTO: 30.5 PG (ref 26.6–33)
MCHC RBC AUTO-ENTMCNC: 31.3 G/DL (ref 31.5–35.7)
MCV RBC AUTO: 97.3 FL (ref 79–97)
MONOCYTES # BLD AUTO: 0.56 10*3/MM3 (ref 0.1–0.9)
MONOCYTES NFR BLD AUTO: 10 % (ref 5–12)
NEUTROPHILS NFR BLD AUTO: 3.93 10*3/MM3 (ref 1.7–7)
NEUTROPHILS NFR BLD AUTO: 69.9 % (ref 42.7–76)
NRBC BLD AUTO-RTO: 0 /100 WBC (ref 0–0.2)
PLATELET # BLD AUTO: 167 10*3/MM3 (ref 140–450)
PMV BLD AUTO: 10.3 FL (ref 6–12)
QT INTERVAL: 490 MS
QT INTERVAL: 502 MS
QT INTERVAL: 504 MS
QT INTERVAL: 506 MS
QTC INTERVAL: 511 MS
QTC INTERVAL: 513 MS
QTC INTERVAL: 517 MS
QTC INTERVAL: 539 MS
RBC # BLD AUTO: 4.13 10*6/MM3 (ref 4.14–5.8)
WBC # BLD AUTO: 5.62 10*3/MM3 (ref 3.4–10.8)

## 2021-06-20 PROCEDURE — 85730 THROMBOPLASTIN TIME PARTIAL: CPT | Performed by: INTERNAL MEDICINE

## 2021-06-20 PROCEDURE — 99232 SBSQ HOSP IP/OBS MODERATE 35: CPT | Performed by: INTERNAL MEDICINE

## 2021-06-20 PROCEDURE — 93005 ELECTROCARDIOGRAM TRACING: CPT | Performed by: SPECIALIST

## 2021-06-20 PROCEDURE — 93010 ELECTROCARDIOGRAM REPORT: CPT | Performed by: INTERNAL MEDICINE

## 2021-06-20 PROCEDURE — 94799 UNLISTED PULMONARY SVC/PX: CPT

## 2021-06-20 PROCEDURE — 99232 SBSQ HOSP IP/OBS MODERATE 35: CPT | Performed by: SPECIALIST

## 2021-06-20 PROCEDURE — 85025 COMPLETE CBC W/AUTO DIFF WBC: CPT | Performed by: INTERNAL MEDICINE

## 2021-06-20 PROCEDURE — 93005 ELECTROCARDIOGRAM TRACING: CPT | Performed by: INTERNAL MEDICINE

## 2021-06-20 PROCEDURE — 25010000002 THIAMINE PER 100 MG: Performed by: INTERNAL MEDICINE

## 2021-06-20 PROCEDURE — 25010000002 HEPARIN (PORCINE) PER 1000 UNITS: Performed by: INTERNAL MEDICINE

## 2021-06-20 RX ADMIN — HEPARIN SODIUM 9.88 UNITS/KG/HR: 10000 INJECTION, SOLUTION INTRAVENOUS at 05:27

## 2021-06-20 RX ADMIN — OXYCODONE HYDROCHLORIDE 15 MG: 15 TABLET ORAL at 21:38

## 2021-06-20 RX ADMIN — HEPARIN SODIUM 2500 UNITS: 5000 INJECTION INTRAVENOUS; SUBCUTANEOUS at 15:28

## 2021-06-20 RX ADMIN — GABAPENTIN 400 MG: 400 CAPSULE ORAL at 20:03

## 2021-06-20 RX ADMIN — TRAZODONE HYDROCHLORIDE 100 MG: 50 TABLET ORAL at 20:05

## 2021-06-20 RX ADMIN — SOTALOL HYDROCHLORIDE 80 MG: 80 TABLET ORAL at 20:03

## 2021-06-20 RX ADMIN — PANTOPRAZOLE SODIUM 40 MG: 40 TABLET, DELAYED RELEASE ORAL at 05:28

## 2021-06-20 RX ADMIN — GABAPENTIN 400 MG: 400 CAPSULE ORAL at 09:06

## 2021-06-20 RX ADMIN — HEPARIN SODIUM 5000 UNITS: 5000 INJECTION INTRAVENOUS; SUBCUTANEOUS at 22:39

## 2021-06-20 RX ADMIN — SACUBITRIL AND VALSARTAN 1 TABLET: 24; 26 TABLET, FILM COATED ORAL at 09:07

## 2021-06-20 RX ADMIN — OXYCODONE HYDROCHLORIDE 15 MG: 15 TABLET ORAL at 10:34

## 2021-06-20 RX ADMIN — GABAPENTIN 400 MG: 400 CAPSULE ORAL at 15:28

## 2021-06-20 RX ADMIN — SOTALOL HYDROCHLORIDE 80 MG: 80 TABLET ORAL at 09:06

## 2021-06-20 RX ADMIN — SACUBITRIL AND VALSARTAN 1 TABLET: 24; 26 TABLET, FILM COATED ORAL at 20:04

## 2021-06-20 RX ADMIN — TAMSULOSIN HYDROCHLORIDE 0.4 MG: 0.4 CAPSULE ORAL at 09:06

## 2021-06-20 RX ADMIN — OXYCODONE HYDROCHLORIDE 15 MG: 15 TABLET ORAL at 00:13

## 2021-06-20 RX ADMIN — HEPARIN SODIUM 9.88 UNITS/KG/HR: 10000 INJECTION, SOLUTION INTRAVENOUS at 22:37

## 2021-06-20 RX ADMIN — ARIPIPRAZOLE 10 MG: 10 TABLET ORAL at 09:06

## 2021-06-20 RX ADMIN — DULOXETINE HYDROCHLORIDE 60 MG: 60 CAPSULE, DELAYED RELEASE ORAL at 20:05

## 2021-06-20 RX ADMIN — DULOXETINE HYDROCHLORIDE 60 MG: 60 CAPSULE, DELAYED RELEASE ORAL at 09:07

## 2021-06-20 RX ADMIN — LEVETIRACETAM 1500 MG: 500 TABLET, FILM COATED ORAL at 20:05

## 2021-06-20 RX ADMIN — OXYCODONE HYDROCHLORIDE 15 MG: 15 TABLET ORAL at 16:59

## 2021-06-20 RX ADMIN — Medication 1 TABLET: at 09:07

## 2021-06-20 RX ADMIN — BUSPIRONE HYDROCHLORIDE 7.5 MG: 10 TABLET ORAL at 09:07

## 2021-06-20 RX ADMIN — ASPIRIN 81 MG: 81 TABLET, COATED ORAL at 09:07

## 2021-06-20 RX ADMIN — NYSTATIN: 100000 POWDER TOPICAL at 09:06

## 2021-06-20 RX ADMIN — BUSPIRONE HYDROCHLORIDE 7.5 MG: 10 TABLET ORAL at 20:04

## 2021-06-20 RX ADMIN — SODIUM CHLORIDE, PRESERVATIVE FREE 3 ML: 5 INJECTION INTRAVENOUS at 09:05

## 2021-06-20 RX ADMIN — HEPARIN SODIUM 7.88 UNITS/KG/HR: 10000 INJECTION, SOLUTION INTRAVENOUS at 06:22

## 2021-06-20 RX ADMIN — LEVETIRACETAM 1500 MG: 500 TABLET, FILM COATED ORAL at 09:06

## 2021-06-20 RX ADMIN — NYSTATIN: 100000 POWDER TOPICAL at 20:07

## 2021-06-20 RX ADMIN — THIAMINE HYDROCHLORIDE 200 MG: 100 INJECTION, SOLUTION INTRAMUSCULAR; INTRAVENOUS at 11:41

## 2021-06-20 RX ADMIN — ALLOPURINOL 300 MG: 300 TABLET ORAL at 09:07

## 2021-06-20 RX ADMIN — SODIUM CHLORIDE, PRESERVATIVE FREE 3 ML: 5 INJECTION INTRAVENOUS at 20:05

## 2021-06-20 RX ADMIN — FLUTICASONE PROPIONATE 2 SPRAY: 50 SPRAY, METERED NASAL at 09:06

## 2021-06-20 RX ADMIN — HEPARIN SODIUM 9.88 UNITS/KG/HR: 10000 INJECTION, SOLUTION INTRAVENOUS at 15:29

## 2021-06-20 NOTE — PROGRESS NOTES
Patient Identification:  Name:  Adriano Henry  Age:  50 y.o.  Sex:  male  :  1970  MRN:  [id number]  Visit Number:  [id number]    Chief Complaint:   Atrial fibrillation with RVR, chest pain    Subjective:  The patient is maintaining sinus rhythm in the 60's on sotalol which was initiated yesterday. QTc this a.m. 515 ms with Right bundle branch block noted. No pauses noted on telemetry. States he feels great. He denies any shortness of breath or chest pain.   ----------------------------------------------------------------------------------------------------------------------  Current Hospital Meds:  allopurinol, 300 mg, Oral, Daily  ARIPiprazole, 10 mg, Oral, Daily  aspirin, 81 mg, Oral, Daily  busPIRone, 7.5 mg, Oral, Q12H  DULoxetine, 60 mg, Oral, BID  fluticasone, 2 spray, Each Nare, Daily  gabapentin, 400 mg, Oral, TID  levETIRAcetam, 1,500 mg, Oral, Q12H  multivitamin with minerals, 1 tablet, Oral, Daily  nystatin, , Topical, Q12H  pantoprazole, 40 mg, Oral, QAM  sacubitril-valsartan, 1 tablet, Oral, Q12H  sodium chloride, 3 mL, Intravenous, Q12H  sotalol, 80 mg, Oral, Q12H  tamsulosin, 0.4 mg, Oral, Daily  thiamine (VITAMIN B1) IVPB, 200 mg, Intravenous, Daily  traZODone, 100 mg, Oral, Nightly      heparin (porcine), 5.88 Units/kg/hr, Last Rate: 7.88 Units/kg/hr (21)  Pharmacy Consult,       ----------------------------------------------------------------------------------------------------------------------  Vital Signs:  Temp:  [97.8 °F (36.6 °C)-98 °F (36.7 °C)] 98 °F (36.7 °C)  Heart Rate:  [63-81] 72  Resp:  [18-20] 18  BP: ()/(53-76) 114/72      21  0500 21  0500 21  0527   Weight: (!) 170 kg (375 lb 6.4 oz) (!) 164 kg (360 lb 9.6 oz) (!) 166 kg (367 lb)     Body mass index is 52.66 kg/m².    Intake/Output Summary (Last 24 hours) at 2021 0925  Last data filed at 2021 0540  Gross per 24 hour   Intake 1619 ml   Output 1375 ml   Net 244 ml     Diet  Regular; Cardiac  ----------------------------------------------------------------------------------------------------------------------  Physical exam:    HEENT:  Head:  Normocephalic and atraumatic.     Eyes:  Conjunctivae and EOM are normal.  Pupils are equal, round, and reactive to light.  No scleral icterus.    Neck:  Neck supple.  No JVD present.  No bruit.  Cardiovascular: Normal rate, regular rhythm, S1 S2+, NO S3 / S4  Pulmonary/Chest:  Vesicular breath sounds B/L, Clear to auscultation, with no added sounds.  Abdominal:  Soft.  Bowel sounds are normal.  No distension and no tenderness.  No organomegaly.  Neurological:  Alert and oriented to person, place, and time. No focal defecits  Skin:  Skin is warm and dry. No rash noted. No pallor.   Musculoskeletal:  No tenderness, and no deformity.  No red or swollen joints anywhere.   Lower extremities: No edema, Peripheral vascular:  2+ Pulses B/L DP.  ----------------------------------------------------------------------------------------------------------------------    ----------------------------------------------------------------------------------------------------------------------  Results from last 7 days   Lab Units 06/15/21  0908 06/15/21  0324 06/15/21  0128   CK TOTAL U/L  --   --  61   TROPONIN T ng/mL <0.010 <0.010 <0.010     Results from last 7 days   Lab Units 06/19/21  0026 06/18/21  0258 06/17/21  1900 06/15/21  0128   CRP mg/dL  --   --   --  <0.30   WBC 10*3/mm3 5.38 5.86 6.08 6.98   HEMOGLOBIN g/dL 11.9* 13.1 12.2* 13.9   HEMATOCRIT % 37.4* 42.3 38.6 42.8   MCV fL 95.7 99.3* 97.2* 94.7   MCHC g/dL 31.8 31.0* 31.6 32.5   PLATELETS 10*3/mm3 183 181 194 248   INR   --   --  1.13*  --          Results from last 7 days   Lab Units 06/19/21  0026 06/18/21  0258 06/17/21  0106 06/16/21  0557 06/15/21  0128   SODIUM mmol/L 140 138 141 138 131*   POTASSIUM mmol/L 3.8 4.4 4.4 4.6 4.0   MAGNESIUM mg/dL  --   --  2.0 1.9 1.5*   CHLORIDE mmol/L 106 107  107 106 94*   CO2 mmol/L 28.4 23.3 26.2 26.3 22.8   BUN mg/dL 7 8 9 6 7   CREATININE mg/dL 0.54* 0.52* 0.55* 0.55* 0.65*   EGFR IF NONAFRICN AM mL/min/1.73 >150 >150 >150 >150 130   CALCIUM mg/dL 8.3* 8.5* 8.6 8.9 8.8   GLUCOSE mg/dL 124* 83 99 123* 107*   ALBUMIN g/dL  --   --   --   --  3.74   BILIRUBIN mg/dL  --   --   --   --  0.4   ALK PHOS U/L  --   --   --   --  118*   AST (SGOT) U/L  --   --   --   --  88*   ALT (SGPT) U/L  --   --   --   --  48*   Estimated Creatinine Clearance: 251.8 mL/min (A) (by C-G formula based on SCr of 0.54 mg/dL (L)).    No results found for: AMMONIA      No results found for: BLOODCX  No results found for: URINECX  No results found for: WOUNDCX  No results found for: STOOLCX    I have personally looked at the labs and they are summarized above.  ----------------------------------------------------------------------------------------------------------------------  Imaging Results (Last 24 Hours)     ** No results found for the last 24 hours. **        ----------------------------------------------------------------------------------------------------------------------    Assessment:  1.  Persistent atrial fibrillation with pauses up to 3.9 seconds status post direct-current cardioversion patient is maintaining normal sinus rhythm with no further pauses  2.  Ischemic cardiomyopathy with left ventricular ejection fraction 45%  3.  Recent chest pain with a stress test negative for ischemia with possible previous anteroapical infarction  4.  Type 2 diabetes mellitus  5.  Morbid obesity    Plan:  1.  Patient remains in normal sinus rhythm, no further pauses he has been tolerating sotalol well his QTC has been stable we will monitor  2.  No clinical CHF continue current management  3.  Continue heparin for 1 more day pending assessment if you need pacemaker depending if you have any several pauses otherwise tomorrow if stable can restart oral anticoagulation          Electronically  signed by ALECIA Saenz, 06/20/21, 11:56 AM EDT.    Electronically signed by Waqar Jones MD, 06/20/21, 12:05 PM EDT.

## 2021-06-20 NOTE — PLAN OF CARE
Goal Outcome Evaluation:  Plan of Care Reviewed With: patient        Progress: improving  Outcome Summary: Pt resting in bed. Pt voices no concerns or complaints at this time. No s/s of acute distress. Will continue to monitor and follow care of plan.

## 2021-06-20 NOTE — PROGRESS NOTES
[facility] HOSPITALIST PROGRESS NOTE     Patient Identification:  Name:  Adriano Henry  Age:  50 y.o.  Sex:  male  :  1970  MRN:  [id number]  Visit Number:  [id number]  ROOM: [address] [place] [address]     Primary Care Provider:  Radames Melo APRN    Length of stay in inpatient status:  5    Subjective     Chief Compliant:    Chief Complaint   Patient presents with   • Chest Pain   • Shortness of Breath   • Suicidal       History of Presenting Illness:    Patient denies any new complaints. Initially wanted to leave A but after cardiology reported why he needed to stay.     ROS:  Otherwise 10 point ROS negative other than documented above in HPI.     Objective     Current Hospital Meds:allopurinol, 300 mg, Oral, Daily  ARIPiprazole, 10 mg, Oral, Daily  aspirin, 81 mg, Oral, Daily  busPIRone, 7.5 mg, Oral, Q12H  DULoxetine, 60 mg, Oral, BID  fluticasone, 2 spray, Each Nare, Daily  gabapentin, 400 mg, Oral, TID  levETIRAcetam, 1,500 mg, Oral, Q12H  multivitamin with minerals, 1 tablet, Oral, Daily  nystatin, , Topical, Q12H  pantoprazole, 40 mg, Oral, QAM  sacubitril-valsartan, 1 tablet, Oral, Q12H  sodium chloride, 3 mL, Intravenous, Q12H  sotalol, 80 mg, Oral, Q12H  tamsulosin, 0.4 mg, Oral, Daily  thiamine (VITAMIN B1) IVPB, 200 mg, Intravenous, Daily  traZODone, 100 mg, Oral, Nightly    heparin (porcine), 5.88 Units/kg/hr, Last Rate: 9.88 Units/kg/hr (21 1529)  Pharmacy Consult,         Current Antimicrobial Therapy:  Anti-Infectives (From admission, onward)    None        Current Diuretic Therapy:  Diuretics (From admission, onward)    None        ----------------------------------------------------------------------------------------------------------------------  Vital Signs:  Temp:  [97.8 °F (36.6 °C)-98.2 °F (36.8 °C)] 97.9 °F (36.6 °C)  Heart Rate:  [63-81] 75  Resp:  [18] 18  BP: ()/(49-76) 98/54  SpO2:  [95 %-99 %] 99 %  on  Flow (L/min):  [2] 2;   Device (Oxygen Therapy):  nasal cannula  Body mass index is 52.66 kg/m².    Wt Readings from Last 3 Encounters:   06/20/21 (!) 166 kg (367 lb)   02/18/18 (!) 198 kg (436 lb)   09/14/17 (!) 189 kg (417 lb)     Intake & Output (last 3 days)       06/18 0701 - 06/19 0700 06/19 0701 - 06/20 0700 06/20 0701 - 06/21 0700    P.O. 840 1320 600    I.V. (mL/kg) 50 (0.3) 779 (4.7) 197 (1.2)    Total Intake(mL/kg) 890 (5.4) 2099 (12.6) 797 (4.8)    Urine (mL/kg/hr) 1850 (0.5) 1375 (0.3) 1200 (0.6)    Total Output 1850 1375 1200    Net -960 +724 -403               Diet Regular; Cardiac  NPO Diet  ----------------------------------------------------------------------------------------------------------------------  Physical exam:  Constitutional:  Well-developed and well-nourished.  No respiratory distress. Obese.   HENT:  Head:  Normocephalic and atraumatic.  Mouth:  Moist mucous membranes.    Eyes:  Conjunctivae and EOM are normal. No scleral icterus.    Neck:  Neck supple.  No JVD present.    Cardiovascular:  Normal rate, regular rhythm and normal heart sounds with no murmur.  Pulmonary/Chest:  No respiratory distress, no wheezes, no crackles, with normal breath sounds and good air movement.  Abdominal:  Soft.  Bowel sounds are normal.  No distension and no tenderness.   Musculoskeletal:  No edema, no tenderness, and no deformity.  No red or swollen joints anywhere.    Neurological:  Alert and oriented to person, place, and time.  No cranial nerve deficit.  No tongue deviation.  No facial droop.  No slurred speech.   Skin:  Skin is warm and dry. No rash noted. No pallor.   Peripheral vascular:  Pulses in all 4 extremities with no clubbing, no cyanosis, no edema.  ----------------------------------------------------------------------------------------------------------------------  Tele:    ----------------------------------------------------------------------------------------------------------------------  Results from last 7 days   Lab Units  06/19/21  0026 06/18/21  0258 06/17/21  1900 06/15/21  0128   CRP mg/dL  --   --   --  <0.30   WBC 10*3/mm3 5.38 5.86 6.08 6.98   HEMOGLOBIN g/dL 11.9* 13.1 12.2* 13.9   HEMATOCRIT % 37.4* 42.3 38.6 42.8   MCV fL 95.7 99.3* 97.2* 94.7   MCHC g/dL 31.8 31.0* 31.6 32.5   PLATELETS 10*3/mm3 183 181 194 248   INR   --   --  1.13*  --          Results from last 7 days   Lab Units 06/19/21  0026 06/18/21 0258 06/17/21  0106 06/16/21  0557 06/15/21  0128   SODIUM mmol/L 140 138 141 138 131*   POTASSIUM mmol/L 3.8 4.4 4.4 4.6 4.0   MAGNESIUM mg/dL  --   --  2.0 1.9 1.5*   CHLORIDE mmol/L 106 107 107 106 94*   CO2 mmol/L 28.4 23.3 26.2 26.3 22.8   BUN mg/dL 7 8 9 6 7   CREATININE mg/dL 0.54* 0.52* 0.55* 0.55* 0.65*   EGFR IF NONAFRICN AM mL/min/1.73 >150 >150 >150 >150 130   CALCIUM mg/dL 8.3* 8.5* 8.6 8.9 8.8   GLUCOSE mg/dL 124* 83 99 123* 107*   ALBUMIN g/dL  --   --   --   --  3.74   BILIRUBIN mg/dL  --   --   --   --  0.4   ALK PHOS U/L  --   --   --   --  118*   AST (SGOT) U/L  --   --   --   --  88*   ALT (SGPT) U/L  --   --   --   --  48*   Estimated Creatinine Clearance: 251.8 mL/min (A) (by C-G formula based on SCr of 0.54 mg/dL (L)).  No results found for: AMMONIA  Results from last 7 days   Lab Units 06/15/21  0908 06/15/21  0324 06/15/21  0128   CK TOTAL U/L  --   --  61   TROPONIN T ng/mL <0.010 <0.010 <0.010             Glucose   Date/Time Value Ref Range Status   06/18/2021 1033 100 70 - 130 mg/dL Final     Lab Results   Component Value Date    TSH 0.559 06/15/2021     No results found for: PREGTESTUR, PREGSERUM, HCG, HCGQUANT  Pain Management Panel     Pain Management Panel Latest Ref Rng & Units 6/15/2021    AMPHETAMINES SCREEN, URINE Negative Positive(A)    BARBITURATES SCREEN Negative Negative    BENZODIAZEPINE SCREEN, URINE Negative Negative    BUPRENORPHINEUR Negative Negative    COCAINE SCREEN, URINE Negative Negative    METHADONE SCREEN, URINE Negative Negative        Brief Urine Lab Results  (Last  result in the past 365 days)      Color   Clarity   Blood   Leuk Est   Nitrite   Protein   CREAT   Urine HCG        06/15/21 0228 Yellow Clear Negative Trace Negative Negative             No results found for: BLOODCX  No results found for: URINECX  No results found for: WOUNDCX  No results found for: STOOLCX  No results found for: RESPCX  No results found for: AFBCX  Results from last 7 days   Lab Units 06/15/21  0128   CRP mg/dL <0.30       I have personally looked at the labs and they are summarized above.  ----------------------------------------------------------------------------------------------------------------------  Detailed radiology reports for the last 24 hours:    Imaging Results (Last 24 Hours)     ** No results found for the last 24 hours. **        Assessment & Plan    #Afib  #Sick sinus syndrome   - [id number]  - TSH wnl   - Initially in RVR, then converted to SVR with pauses up to 3.9 seconds on 6/16. Asymptomatic.   - Hold all AV lesa blockade.   - Cardiology cardioverted on 6/18, will monitor for bradycardia and pauses.  - Maintaining sinus rhythm. asymptomatic, with no significant pauses, will continue to monitor on tele.   - Cardiology started sotalol and monitoring QTC.     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   - Compensated on exam. Heart failure may in part be from tachyarrhythmia or ischemi cardiomyopathy.   - BB held for SVR. Entresto per cardiology.     #Electrical tazing   #Chest pain   - Troponin negativeX3, EKG without significant ischemic changes  - Stress test intermediate with no ischemia noted.   - Continue ASA statin. Cardiology following.     #SI/HI  - Psych consulted. Appreciate recs. No longer reporting SI/HI.     #Illicit amphetamine use   - Patient reports he snorts meth  - Psych following     #ETOH abuse   #Mild hepatocellular transaminitis   #Positive  hepatitis C antibody   - CIWA  - Outpatient f/u     #Morbid obesity   - Complicates all aspects of care    #Seizure disorder  - Continue home keppra    #Hypomagnesemia  - Replaced    #Gout  - Continue allopurinol     #Hx of gastric bypass and ayush filter     F: PO  E: Replace as needed   N: Cardiac     Code status: Full     Dispo: Pending clinical improvement       VTE Prophylaxis:   Mechanical Order History:     None      Pharmalogical Order History:      Ordered     Dose Route Frequency Stop    06/17/21 1837  heparin (porcine) 5000 UNIT/ML injection 5,000 Units      5,000 Units IV Once 06/17/21 2051 06/17/21 1837  heparin 09753 units/250 mL (100 units/mL) in 0.45 % NaCl infusion  9.99 mL/hr      5.88 Units/kg/hr IV Titrated --    06/17/21 1837  heparin (porcine) 5000 UNIT/ML injection 5,000 Units      5,000 Units IV As Needed --    06/17/21 1837  heparin (porcine) 5000 UNIT/ML injection 2,500 Units      2,500 Units IV As Needed --    06/15/21 1425  rivaroxaban (XARELTO) tablet 20 mg  Status:  Discontinued      20 mg PO Nightly 06/17/21 0758                Suman Fulton MD  [facility]  06/20/21  19:49 EDT

## 2021-06-20 NOTE — PLAN OF CARE
Goal Outcome Evaluation:  Plan of Care Reviewed With: patient resting in bed with no acute distress noted at this time. Patient has denied SOA when asked this shift. Patient had complaints of back pain with PRN medication given as ordered and tolerable pain level noted. Will continue to monitor and follow plan of care with interventions implemented as needed             Progress: improving

## 2021-06-21 VITALS
TEMPERATURE: 97.9 F | HEIGHT: 70 IN | SYSTOLIC BLOOD PRESSURE: 128 MMHG | WEIGHT: 315 LBS | BODY MASS INDEX: 45.1 KG/M2 | HEART RATE: 68 BPM | DIASTOLIC BLOOD PRESSURE: 69 MMHG | RESPIRATION RATE: 20 BRPM | OXYGEN SATURATION: 93 %

## 2021-06-21 LAB
APTT PPP: 47.5 SECONDS (ref 25.5–35.4)
APTT PPP: >100 SECONDS (ref 25.5–35.4)

## 2021-06-21 PROCEDURE — 85730 THROMBOPLASTIN TIME PARTIAL: CPT | Performed by: INTERNAL MEDICINE

## 2021-06-21 PROCEDURE — 93010 ELECTROCARDIOGRAM REPORT: CPT | Performed by: INTERNAL MEDICINE

## 2021-06-21 PROCEDURE — 94799 UNLISTED PULMONARY SVC/PX: CPT

## 2021-06-21 PROCEDURE — 25010000002 THIAMINE PER 100 MG: Performed by: INTERNAL MEDICINE

## 2021-06-21 PROCEDURE — 93005 ELECTROCARDIOGRAM TRACING: CPT | Performed by: SPECIALIST

## 2021-06-21 PROCEDURE — 99239 HOSP IP/OBS DSCHRG MGMT >30: CPT | Performed by: INTERNAL MEDICINE

## 2021-06-21 PROCEDURE — 99232 SBSQ HOSP IP/OBS MODERATE 35: CPT | Performed by: SPECIALIST

## 2021-06-21 RX ORDER — NYSTATIN 100000 [USP'U]/G
POWDER TOPICAL EVERY 12 HOURS SCHEDULED
Qty: 30 G | Refills: 0 | Status: SHIPPED | OUTPATIENT
Start: 2021-06-21 | End: 2021-06-26

## 2021-06-21 RX ORDER — SOTALOL HYDROCHLORIDE 80 MG/1
80 TABLET ORAL EVERY 12 HOURS SCHEDULED
Qty: 60 TABLET | Refills: 0 | Status: SHIPPED | OUTPATIENT
Start: 2021-06-21 | End: 2021-07-22 | Stop reason: SDUPTHER

## 2021-06-21 RX ORDER — ASPIRIN 81 MG/1
81 TABLET ORAL DAILY
Qty: 30 TABLET | Refills: 0 | Status: SHIPPED | OUTPATIENT
Start: 2021-06-21 | End: 2022-05-26

## 2021-06-21 RX ORDER — COLCHICINE 0.6 MG/1
0.6 TABLET ORAL 2 TIMES DAILY PRN
Status: ON HOLD
Start: 2021-06-21 | End: 2022-07-25

## 2021-06-21 RX ADMIN — Medication 1 TABLET: at 10:48

## 2021-06-21 RX ADMIN — LEVETIRACETAM 1500 MG: 500 TABLET, FILM COATED ORAL at 10:48

## 2021-06-21 RX ADMIN — NYSTATIN: 100000 POWDER TOPICAL at 09:47

## 2021-06-21 RX ADMIN — SOTALOL HYDROCHLORIDE 80 MG: 80 TABLET ORAL at 10:49

## 2021-06-21 RX ADMIN — ARIPIPRAZOLE 10 MG: 10 TABLET ORAL at 10:49

## 2021-06-21 RX ADMIN — THIAMINE HYDROCHLORIDE 200 MG: 100 INJECTION, SOLUTION INTRAMUSCULAR; INTRAVENOUS at 10:50

## 2021-06-21 RX ADMIN — OXYCODONE HYDROCHLORIDE 15 MG: 15 TABLET ORAL at 09:46

## 2021-06-21 RX ADMIN — ASPIRIN 81 MG: 81 TABLET, COATED ORAL at 10:48

## 2021-06-21 RX ADMIN — OXYCODONE HYDROCHLORIDE 15 MG: 15 TABLET ORAL at 02:32

## 2021-06-21 RX ADMIN — BUSPIRONE HYDROCHLORIDE 7.5 MG: 10 TABLET ORAL at 10:45

## 2021-06-21 RX ADMIN — FLUTICASONE PROPIONATE 2 SPRAY: 50 SPRAY, METERED NASAL at 09:47

## 2021-06-21 RX ADMIN — SODIUM CHLORIDE, PRESERVATIVE FREE 3 ML: 5 INJECTION INTRAVENOUS at 09:47

## 2021-06-21 RX ADMIN — TAMSULOSIN HYDROCHLORIDE 0.4 MG: 0.4 CAPSULE ORAL at 10:49

## 2021-06-21 RX ADMIN — ALLOPURINOL 300 MG: 300 TABLET ORAL at 10:49

## 2021-06-21 RX ADMIN — DULOXETINE HYDROCHLORIDE 60 MG: 60 CAPSULE, DELAYED RELEASE ORAL at 10:49

## 2021-06-21 RX ADMIN — GABAPENTIN 400 MG: 400 CAPSULE ORAL at 10:50

## 2021-06-21 RX ADMIN — SACUBITRIL AND VALSARTAN 1 TABLET: 24; 26 TABLET, FILM COATED ORAL at 10:47

## 2021-06-21 NOTE — PLAN OF CARE
Goal Outcome Evaluation:              Outcome Summary: Patient is resting in bed with no acute distress noted. Patient is currently not therapeutic with Heparin therapy. Will recheck PTT at 0330. checked with Soledad JEFFERSON before giving 2100 dose of Sotalol d/t QTC greater than 500. She stated dto go ahead and give. EKG got 3 hous after dose and . Will continue to monitor and follow plan of care.

## 2021-06-21 NOTE — PROGRESS NOTES
LOS: 6 days     Name: Adriano Henry  Age/Sex: 50 y.o. male  :  1970        PCP: Radames Melo APRN  REF: No Known Provider    Active Problems:    Afib (CMS/[facility])      Reason for follow-up: Atrial fibrillation    Subjective       Subjective     Adriano Henry is a 50 year old male with a past medical history significant for atrial fibrillation, COPD, hypertension, and seizures. Patient presented to the ER with complaints of chest pain.     Interval History: Remains in normal sinus rhythm.  No pauses noted overnight.  QTC stable on sotalol.  Patient reports he is feeling better today.  No bleeding reported. Anticipated discharge today.     Vital Signs  Temp:  [97.6 °F (36.4 °C)-98.2 °F (36.8 °C)] 97.9 °F (36.6 °C)  Heart Rate:  [60-84] 68  Resp:  [18-20] 20  BP: ()/(49-74) 128/69     Vital Signs (last 72 hrs)        0700  -   0659  07  -   0659  0700  -   0659  0700  -   1127   Most Recent    Temp (°F) 97.4 -  98.2    97.8 -  98    97.6 -  98.2      97.9     97.9 (36.6)    Heart Rate 60 -  96    63 -  81    60 -  84    68 -  70     68    Resp 18 -  20    18 -  20    18 -  20      20     20    /51 -  135/85    92/53 -  127/76    98/54 -  147/74    125/65 -  129/72     128/69    SpO2 (%) 94 -  99    95 -  96    93 -  99       93        Documented weights    06/15/21 0110 06/15/21 0556 21 0442 21 0250   Weight: (!) 194 kg (428 lb) (!) 173 kg (381 lb 4.8 oz) (!) 177 kg (389 lb 8 oz) (!) 170 kg (375 lb)    21 0500 21 0500 21 0527 21 0500   Weight: (!) 170 kg (375 lb 6.4 oz) (!) 164 kg (360 lb 9.6 oz) (!) 166 kg (367 lb) (!) 167 kg (367 lb 3.2 oz)      Body mass index is 52.69 kg/m².    Intake/Output Summary (Last 24 hours) at 2021 1127  Last data filed at 2021 0500  Gross per 24 hour   Intake 437 ml   Output 1450 ml   Net -1013 ml     Objective    Objective       Physical Exam:     General  Appearance:    Alert, cooperative, in no acute distress   Head:    Normocephalic, without obvious abnormality, atraumatic   Eyes:            Conjunctivae and sclerae normal, no   icterus, no pallor, corneas clear.   Neck:   No adenopathy, supple, trachea midline, no thyromegaly, no   carotid bruit, no JVD   Lungs:     Clear to auscultation,respirations regular, even and                  unlabored    Heart:    Regular rhythm and normal rate, normal S1 and S2, no            murmur, no gallop, no rub, no click   Chest Wall:    No abnormalities observed   Abdomen:     Normal bowel sounds, no masses, no organomegaly, soft        non-tender, non-distended, no guarding, no rebound                tenderness   Extremities:   Moves all extremities well, no edema, no cyanosis, no             redness   Pulses:   Pulses palpable and equal bilaterally   Skin:   No bleeding, bruising or rash       Neurologic:   Alert and oriented      Results review       Results Review:   Results from last 7 days   Lab Units 06/20/21 2124 06/19/21  0026 06/18/21  0258 06/17/21  1900 06/17/21  0106 06/16/21  0557 06/15/21  0128   WBC 10*3/mm3 5.62 5.38 5.86 6.08 6.32 7.04 6.98   HEMOGLOBIN g/dL 12.6* 11.9* 13.1 12.2* 12.6* 13.6 13.9   PLATELETS 10*3/mm3 167 183 181 194 177 228 248     Results from last 7 days   Lab Units 06/19/21  0026 06/18/21  0258 06/17/21  0106 06/16/21  0557 06/15/21  0128   SODIUM mmol/L 140 138 141 138 131*   POTASSIUM mmol/L 3.8 4.4 4.4 4.6 4.0   CHLORIDE mmol/L 106 107 107 106 94*   CO2 mmol/L 28.4 23.3 26.2 26.3 22.8   BUN mg/dL 7 8 9 6 7   CREATININE mg/dL 0.54* 0.52* 0.55* 0.55* 0.65*   CALCIUM mg/dL 8.3* 8.5* 8.6 8.9 8.8   GLUCOSE mg/dL 124* 83 99 123* 107*   ALT (SGPT) U/L  --   --   --   --  48*   AST (SGOT) U/L  --   --   --   --  88*     Results from last 7 days   Lab Units 06/15/21  0908 06/15/21  0324 06/15/21  0128   CK TOTAL U/L  --   --  61   TROPONIN T ng/mL <0.010 <0.010 <0.010     Lab Results   Component  Value Date    INR 1.13 (H) 06/17/2021    INR 1.63 (H) 08/15/2017     Lab Results   Component Value Date    MG 2.0 06/17/2021    MG 1.9 06/16/2021    MG 1.5 (L) 06/15/2021     Lab Results   Component Value Date    TSH 0.559 06/15/2021      Imaging Results (Last 48 Hours)     ** No results found for the last 48 hours. **        Echo   Results for orders placed during the hospital encounter of 06/15/21    Adult Transthoracic Echo Complete W/ Cont if Necessary Per Protocol    Interpretation Summary  · Normal left ventricular cavity size and wall thickness noted  · The following left ventricular wall segments are hypokinetic: apical anterior, apical lateral, apical inferior, apical septal and apex hypokinetic.  · Left ventricular ejection fraction appears to be 41 - 45%. Left ventricular systolic function is moderately decreased.  · Left ventricular diastolic function is consistent with (grade II w/high LAP) pseudonormalization.  · The left atrial cavity is moderately dilated. Left atrial volume is moderately increased.  · The aortic valve is not well visualized. The aortic valve is grossly normal in structure. Trace aortic valve regurgitation is present. No aortic valve stenosis is present.  · There is moderate, posterior mitral leaflet thickening present. Moderate mitral valve regurgitation is present. No significant mitral valve stenosis is present.  · There is no evidence of pericardial effusion.     I reviewed the patient's new clinical results.    Telemetry: NSR 60-70 bpm      Medication Review:   allopurinol, 300 mg, Oral, Daily  ARIPiprazole, 10 mg, Oral, Daily  aspirin, 81 mg, Oral, Daily  busPIRone, 7.5 mg, Oral, Q12H  DULoxetine, 60 mg, Oral, BID  fluticasone, 2 spray, Each Nare, Daily  gabapentin, 400 mg, Oral, TID  levETIRAcetam, 1,500 mg, Oral, Q12H  multivitamin with minerals, 1 tablet, Oral, Daily  nystatin, , Topical, Q12H  pantoprazole, 40 mg, Oral, QAM  sacubitril-valsartan, 1 tablet, Oral,  Q12H  sodium chloride, 3 mL, Intravenous, Q12H  sotalol, 80 mg, Oral, Q12H  tamsulosin, 0.4 mg, Oral, Daily  thiamine (VITAMIN B1) IVPB, 200 mg, Intravenous, Daily  traZODone, 100 mg, Oral, Nightly        heparin (porcine), 5.88 Units/kg/hr, Last Rate: 10.88 Units/kg/hr (06/21/21 0441)  Pharmacy Consult,         Assessment      Assessment:  1.  Persistent atrial fibrillation with pauses up to 3.9 seconds status post direct-current cardioversion patient is maintaining normal sinus rhythm with no further pauses  2.  Ischemic cardiomyopathy with left ventricular ejection fraction 45%  3.  Recent chest pain with a stress test negative for ischemia with possible previous anteroapical infarction  4.  Type 2 diabetes mellitus  5.  Morbid obesity        Plan     Recommendations:  1. Patient is maintaining normal sinus rhythm with no further pauses he is asymptomatic we will continue current medications  2. His QTC is stable continue current dose of sotalol  3. Restart Xarelto and can discontinue heparin  4. Patient is compensated follow-up with outpatient heart failure clinic  5. Patient can be discharged home if is okay with medicine service    I discussed the patients findings and my recommendations with patient and family    Electronically signed by ALECIA Breen, 06/21/21, 11:27 AM EDT.  Electronically signed by Waqar Jones MD, 06/21/21, 11:50 AM EDT.  Please note that portions of this note were completed with a voice recognition program.

## 2021-06-21 NOTE — CASE MANAGEMENT/SOCIAL WORK
Discharge Planning Assessment   [facility]     Patient Name: Adriano Henry  MRN: [id number]  Today's Date: 6/21/2021    Admit Date: 6/15/2021    Discharge Needs Assessment    No documentation.    Discharge Plan     Row Name 06/21/21 1126       Plan    Final Discharge Disposition Code  01 - home or self-care    Final Note  Pt to be discharged home on this date.        ZEESHAN ManjarrezW
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
## 2021-06-22 ENCOUNTER — READMISSION MANAGEMENT (OUTPATIENT)
Dept: CALL CENTER | Facility: HOSPITAL | Age: 51
End: 2021-06-22

## 2021-06-22 LAB
QT INTERVAL: 462 MS
QT INTERVAL: 494 MS
QT INTERVAL: 504 MS
QT INTERVAL: 534 MS
QTC INTERVAL: 512 MS
QTC INTERVAL: 515 MS
QTC INTERVAL: 519 MS
QTC INTERVAL: 544 MS

## 2021-06-22 NOTE — PAYOR COMM NOTE
"[facility]  NPI:[id number]    Utilization Review  Contact: Marcelina Weathers RN  Phone: [phone]  Fax:[phone]    DISCHARGE NOTIFICATION   PENDING REF#[id number].      Gio Henry (50 y.o. Male)     Date of Birth Social Security Number Address Home Phone MRN    1970  [address] [facility] [address]  [id number]    [employer] Mar[employer] Status          None        Admission Date Admission Type Admitting Provider Attending Provider Department, Room/Bed    6/15/21 Emergency Deon Castro MD  [facility] 3 SOUTH, 3319/1P    Discharge Date Discharge Disposition Discharge Destination        6/21/2021 Home or Self Care              Attending Provider: (none)   Allergies: No Known Allergies    Isolation: None   Infection: None   Code Status: Prior    Ht: 177.8 cm (70\")   Wt: 167 kg (367 lb 3.2 oz)    Admission Cmt: None   Principal Problem: None                Active Insurance as of 6/15/2021     Primary Coverage     Payor Plan Insurance Group Employer/Plan Group    ANTHEM MEDICARE REPLACEMENT ANTHEM MEDICARE ADVANTAGE [id number]     Payor Plan Address Payor Plan Phone Number Payor Plan Fax Number Effective Dates    [id number] [phone]  7/1/2020 - None Entered    [facility] [address]       Subscriber Name Subscriber Birth Date Member ID       GIO HENRY 1970 [id number]           Secondary Coverage     Payor Plan Insurance Group Employer/Plan Group    [id number]      Payor Plan Address Payor Plan Phone Number Payor Plan Fax Number Effective Dates    [address]   6/1/2021 - None Entered    [facility] [id number]       Subscriber Name Subscriber Birth Date Member ID       GIO HENRY 1970 [id number]                 Emergency Contacts      (Rel.) Home Phone Work Phone Mobile Phone    CarlRenetta (Spouse) -- -- [phone]            Discharge Summary    No notes of this type " exist for this encounter.

## 2021-06-22 NOTE — OUTREACH NOTE
Prep Survey      Responses   [facility] facility patient discharged from?  [facility]   Is LACE score < 7 ?  No   Emergency Room discharge w/ pulse ox?  No   Eligibility  Readm Mgmt   Discharge diagnosis  C/a-fib   Does the patient have one of the following disease processes/diagnoses(primary or secondary)?  Other   Does the patient have Home health ordered?  No   Is there a DME ordered?  No   Prep survey completed?  Yes          Carlita Herrera RN

## 2021-06-23 ENCOUNTER — READMISSION MANAGEMENT (OUTPATIENT)
Dept: CALL CENTER | Facility: HOSPITAL | Age: 51
End: 2021-06-23

## 2021-06-23 NOTE — OUTREACH NOTE
Medical Week 1 Survey      Responses   [facility] patient discharged from?  [facility]   Does the patient have one of the following disease processes/diagnoses(primary or secondary)?  Other   Week 1 attempt successful?  No   Unsuccessful attempts  Attempt 1          Karie Britton RN

## 2021-06-24 ENCOUNTER — READMISSION MANAGEMENT (OUTPATIENT)
Dept: CALL CENTER | Facility: HOSPITAL | Age: 51
End: 2021-06-24

## 2021-06-24 NOTE — OUTREACH NOTE
Medical Week 1 Survey      Responses   [facility] patient discharged from?  [facility]   Does the patient have one of the following disease processes/diagnoses(primary or secondary)?  Other   Week 1 attempt successful?  No   Unsuccessful attempts  Attempt 2          Indira Wyatt RN

## 2021-06-29 ENCOUNTER — READMISSION MANAGEMENT (OUTPATIENT)
Dept: CALL CENTER | Facility: HOSPITAL | Age: 51
End: 2021-06-29

## 2021-06-29 NOTE — OUTREACH NOTE
Medical Week 2 Survey      Responses   [facility] patient discharged from?  [facility]   Does the patient have one of the following disease processes/diagnoses(primary or secondary)?  Other   Week 2 attempt successful?  No   Revoke  Phone number issues [Only one phone # listed for patient/spouse. Number doesn't ring and then goes to fast busy. ]          Patricia Richardson RN

## 2021-07-21 ENCOUNTER — OFFICE VISIT (OUTPATIENT)
Dept: CARDIOLOGY | Facility: CLINIC | Age: 51
End: 2021-07-21

## 2021-07-21 VITALS
WEIGHT: 315 LBS | SYSTOLIC BLOOD PRESSURE: 176 MMHG | TEMPERATURE: 98.7 F | BODY MASS INDEX: 45.1 KG/M2 | HEART RATE: 62 BPM | HEIGHT: 70 IN | DIASTOLIC BLOOD PRESSURE: 86 MMHG

## 2021-07-21 DIAGNOSIS — I50.22 CHRONIC HFREF (HEART FAILURE WITH REDUCED EJECTION FRACTION) (HCC): Primary | ICD-10-CM

## 2021-07-21 DIAGNOSIS — R07.2 PRECORDIAL PAIN: ICD-10-CM

## 2021-07-21 DIAGNOSIS — I48.0 PAROXYSMAL ATRIAL FIBRILLATION (HCC): ICD-10-CM

## 2021-07-21 DIAGNOSIS — I50.20 HEART FAILURE WITH REDUCED EJECTION FRACTION (HCC): ICD-10-CM

## 2021-07-21 PROCEDURE — 99214 OFFICE O/P EST MOD 30 MIN: CPT | Performed by: PHYSICIAN ASSISTANT

## 2021-07-21 RX ORDER — SACUBITRIL AND VALSARTAN 49; 51 MG/1; MG/1
1 TABLET, FILM COATED ORAL 2 TIMES DAILY
Qty: 180 TABLET | Refills: 2 | Status: SHIPPED | OUTPATIENT
Start: 2021-07-21 | End: 2021-12-01 | Stop reason: SDUPTHER

## 2021-07-21 RX ORDER — SPIRONOLACTONE 25 MG/1
25 TABLET ORAL DAILY
Qty: 90 TABLET | Refills: 3 | Status: SHIPPED | OUTPATIENT
Start: 2021-07-21 | End: 2021-12-01 | Stop reason: SDUPTHER

## 2021-07-21 RX ORDER — NITROGLYCERIN 0.4 MG/1
0.4 TABLET SUBLINGUAL
Qty: 90 TABLET | Refills: 2 | Status: SHIPPED | OUTPATIENT
Start: 2021-07-21 | End: 2021-10-25

## 2021-07-21 RX ORDER — NITROGLYCERIN 0.4 MG/1
0.4 TABLET SUBLINGUAL
COMMUNITY
End: 2021-07-21 | Stop reason: SDUPTHER

## 2021-07-21 NOTE — PROGRESS NOTES
Radames Melo APRN  Adriano Henry  1970 07/21/2021    Patient Active Problem List   Diagnosis   • Cellulitis and abscess of lower extremity   • Hematoma   • Paroxysmal atrial fibrillation (CMS/HCC)   • Heart failure with reduced ejection fraction (CMS/HCC)       Dear Radames Melo APRN:    Subjective     History of Present Illness:    Chief Complaint   Patient presents with   • Follow-up     HOSPITAL FOLLOW UP   • Chest Pain     INTERMINTENT       Adriano Henry is a pleasant 50 y.o. male with a past medical history significant for heart failure with reduced ejection fraction of 41 to 45% as well as heart failure with preserved ejection fraction with grade 2 diastolic dysfunction, he has been having persistent chest pains with recent stress test showing medium size infarct  he does deny any known history of CAD although he did have a recent stress test that did show medium size infarct in inferior and apex wall.  He does also have paroxysmal atrial fibrillation maintaining sinus rhythm with the assistance of sotalol and anticoagulated with Xarelto.  He also has history of essential hypertension and dyslipidemia.  He comes in today for routine cardiology follow-up after recent hospital visit.    Mr. Henry with open questions did deny any cardiac issues however upon further questioning he has been having some chest pains reportedly 5-6 times since he was hospitalized.  With one episode being severe enough where they did call an ambulance but did not go with him to the ER.  He reported that the chest pain did resolve after a dose of nitroglycerin via sublingual.  He does have chronic shortness of breath but denies worsening orthopnea from his baseline or PND.  He also denies palpitations, dizziness, or syncope.  He also denied any episodes of suicidal ideation.          No Known Allergies:      Current Outpatient Medications:   •  albuterol (PROVENTIL HFA;VENTOLIN HFA) 108 (90 Base)  MCG/ACT inhaler, Inhale 2 puffs Every 6 (Six) Hours As Needed for Shortness of Air., Disp: , Rfl:   •  allopurinol (ZYLOPRIM) 300 MG tablet, Take 300 mg by mouth Daily., Disp: , Rfl:   •  ARIPiprazole (ABILIFY) 10 MG tablet, Take 10 mg by mouth Daily., Disp: , Rfl:   •  aspirin 81 MG EC tablet, Take 1 tablet by mouth Daily., Disp: 30 tablet, Rfl: 0  •  azelastine (ASTELIN) 0.1 % nasal spray, 1 spray into the nostril(s) as directed by provider 2 (Two) Times a Day. Use in each nostril as directed, Disp: , Rfl:   •  colchicine 0.6 MG tablet, Take 1 tablet by mouth 2 (Two) Times a Day As Needed for Muscle / Joint Pain (gout flare)., Disp: , Rfl:   •  DULoxetine (CYMBALTA) 60 MG capsule, Take 60 mg by mouth 2 (Two) Times a Day., Disp: , Rfl:   •  gabapentin (NEURONTIN) 400 MG capsule, Take 400 mg by mouth 3 (Three) Times a Day., Disp: , Rfl:   •  levETIRAcetam (KEPPRA) 750 MG tablet, Take 1,500 mg by mouth 2 (Two) Times a Day., Disp: , Rfl:   •  metFORMIN (GLUCOPHAGE) 500 MG tablet, Take 500 mg by mouth Daily., Disp: , Rfl:   •  multivitamin with minerals (DAILY-KINGA MENS FORMULA PO), Take 1 tablet by mouth Daily., Disp: , Rfl:   •  niacin (SLO-NIACIN) 500 MG CR tablet, Take 500 mg by mouth Daily., Disp: , Rfl:   •  nitroglycerin (NITROSTAT) 0.4 MG SL tablet, Place 1 tablet under the tongue Every 5 (Five) Minutes As Needed for Chest Pain. Take no more than 3 doses in 15 minutes., Disp: 90 tablet, Rfl: 2  •  omeprazole (priLOSEC) 20 MG capsule, Take 20 mg by mouth Daily., Disp: , Rfl:   •  oxyCODONE (ROXICODONE) 15 MG immediate release tablet, Take 15 mg by mouth 4 (Four) Times a Day As Needed for Moderate Pain ., Disp: , Rfl:   •  rivaroxaban (XARELTO) 20 MG tablet, Take 20 mg by mouth every night at bedtime. AFIB, Disp: , Rfl:   •  rosuvastatin (CRESTOR) 10 MG tablet, Take 10 mg by mouth Every Night., Disp: , Rfl:   •  sotalol (BETAPACE) 80 MG tablet, Take 1 tablet by mouth Every 12 (Twelve) Hours., Disp: 60 tablet,  "Rfl: 0  •  tamsulosin (FLOMAX) 0.4 MG capsule 24 hr capsule, Take 1 capsule by mouth Daily., Disp: , Rfl:   •  thiamine (VITAMIN B-1) 100 MG tablet  tablet, Take 100 mg by mouth 2 (two) times a day., Disp: , Rfl:   •  tiZANidine (ZANAFLEX) 4 MG tablet, Take 4 mg by mouth 2 (Two) Times a Day As Needed for Muscle Spasms (muscle pain / tightness)., Disp: , Rfl:   •  traZODone (DESYREL) 100 MG tablet, Take 100 mg by mouth Every Night., Disp: , Rfl:   •  busPIRone (BUSPAR) 7.5 MG tablet, Take 7.5 mg by mouth 2 (two) times a day., Disp: , Rfl:   •  sacubitril-valsartan (Entresto) 49-51 MG tablet, Take 1 tablet by mouth 2 (Two) Times a Day., Disp: 180 tablet, Rfl: 2  •  spironolactone (ALDACTONE) 25 MG tablet, Take 1 tablet by mouth Daily., Disp: 90 tablet, Rfl: 3    The following portions of the patient's history were reviewed and updated as appropriate: allergies, current medications, past family history, past medical history, past social history, past surgical history and problem list.    Social History     Tobacco Use   • Smoking status: Current Every Day Smoker     Packs/day: 1.00     Years: 10.00     Pack years: 10.00     Types: Cigarettes   Substance Use Topics   • Alcohol use: Yes   • Drug use: No         Objective   Vitals:    07/21/21 1502   BP: 176/86   Pulse: 62   Temp: 98.7 °F (37.1 °C)   Weight: (!) 170 kg (374 lb 9.6 oz)   Height: 177.8 cm (70\")     Body mass index is 53.75 kg/m².    Constitutional:       General: Not in acute distress.     Appearance: Healthy appearance. Well-developed and not in distress. Not diaphoretic.   Eyes:      Conjunctiva/sclera: Conjunctivae normal.      Pupils: Pupils are equal, round, and reactive to light.   HENT:      Head: Normocephalic and atraumatic.   Neck:      Vascular: No carotid bruit or JVD.   Pulmonary:      Effort: Pulmonary effort is normal. No respiratory distress.      Breath sounds: Normal breath sounds.   Cardiovascular:      Normal rate. Regular rhythm. "   Skin:     General: Skin is cool.   Neurological:      Mental Status: Alert, oriented to person, place, and time and oriented to person, place and time.         Lab Results   Component Value Date     06/19/2021    K 3.8 06/19/2021     06/19/2021    CO2 28.4 06/19/2021    BUN 7 06/19/2021    CREATININE 0.54 (L) 06/19/2021    GLUCOSE 124 (H) 06/19/2021    CALCIUM 8.3 (L) 06/19/2021    AST 88 (H) 06/15/2021    ALT 48 (H) 06/15/2021    ALKPHOS 118 (H) 06/15/2021     Lab Results   Component Value Date    CKTOTAL 61 06/15/2021     Lab Results   Component Value Date    WBC 5.62 06/20/2021    HGB 12.6 (L) 06/20/2021    HCT 40.2 06/20/2021     06/20/2021     Lab Results   Component Value Date    INR 1.13 (H) 06/17/2021    INR 1.63 (H) 08/15/2017     Lab Results   Component Value Date    MG 2.0 06/17/2021     Lab Results   Component Value Date    TSH 0.559 06/15/2021      No results found for: BNP    During this visit the following were done:  Labs Reviewed []    Labs Ordered []    Radiology Reports Reviewed []    Radiology Ordered []    PCP Records Reviewed []    Referring Provider Records Reviewed []    ER Records Reviewed []    Hospital Records Reviewed []    History Obtained From Family []    Radiology Images Reviewed []    Other Reviewed []    Records Requested []       Procedures    Assessment/Plan    Diagnosis Plan   1. Chronic HFrEF (heart failure with reduced ejection fraction) (CMS/[facility])  Basic Metabolic Panel   2. Heart failure with reduced ejection fraction (CMS/HCC)     3. Paroxysmal atrial fibrillation (CMS/[facility])     4. Precordial pain              Recommendations:  1. Chronic HFrEF  1. We will continue optimizing medical therapy for his heart failure as well as coronary artery disease.  We will increase Entresto to 49-51 mg twice daily.  2. We will also start patient on Aldactone 25 mg daily.  I did provide written instruction and explained to him and his wife multiple times before he left,  until he was able to repeat the instructions to me. The instructions were:  he is to take the higher dose of Entresto for 4 to 5 days if blood pressure is still greater than 110/70 to start Aldactone and to get BMP in 2 weeks.  3. I will continue aspirin and Crestor.  2. Paroxysmal atrial fibrillation  1. We will continue sotalol for rate control and Xarelto for anticoagulation.      Return in about 4 weeks (around 8/18/2021).    As always, I appreciate very much the opportunity to participate in the cardiovascular care of your patients.      With Best Regards,    Mark Durán PA-C

## 2021-07-21 NOTE — PROGRESS NOTES
Radames Melo APRN  Adriano Henry  1970 07/21/2021    Patient Active Problem List   Diagnosis   • Cellulitis and abscess of lower extremity   • Hematoma   • Afib (CMS/HCC)       Dear Radames Melo APRN:    Subjective     Adriano Henry is a 50 y.o. male with the problems as listed above, presents    Chief Complaint   Patient presents with   • Follow-up       History of Present Illness:        No Known Allergies:      Current Outpatient Medications:   •  albuterol (PROVENTIL HFA;VENTOLIN HFA) 108 (90 Base) MCG/ACT inhaler, Inhale 2 puffs Every 6 (Six) Hours As Needed for Shortness of Air., Disp: , Rfl:   •  allopurinol (ZYLOPRIM) 300 MG tablet, Take 300 mg by mouth Daily., Disp: , Rfl:   •  ARIPiprazole (ABILIFY) 10 MG tablet, Take 10 mg by mouth Daily., Disp: , Rfl:   •  aspirin 81 MG EC tablet, Take 1 tablet by mouth Daily., Disp: 30 tablet, Rfl: 0  •  azelastine (ASTELIN) 0.1 % nasal spray, 1 spray into the nostril(s) as directed by provider 2 (Two) Times a Day. Use in each nostril as directed, Disp: , Rfl:   •  busPIRone (BUSPAR) 7.5 MG tablet, Take 7.5 mg by mouth 2 (two) times a day., Disp: , Rfl:   •  colchicine 0.6 MG tablet, Take 1 tablet by mouth 2 (Two) Times a Day As Needed for Muscle / Joint Pain (gout flare)., Disp: , Rfl:   •  DULoxetine (CYMBALTA) 60 MG capsule, Take 60 mg by mouth 2 (Two) Times a Day., Disp: , Rfl:   •  gabapentin (NEURONTIN) 400 MG capsule, Take 400 mg by mouth 3 (Three) Times a Day., Disp: , Rfl:   •  levETIRAcetam (KEPPRA) 750 MG tablet, Take 1,500 mg by mouth 2 (Two) Times a Day., Disp: , Rfl:   •  metFORMIN (GLUCOPHAGE) 500 MG tablet, Take 500 mg by mouth Daily., Disp: , Rfl:   •  multivitamin with minerals (DAILY-KINGA MENS FORMULA PO), Take 1 tablet by mouth Daily., Disp: , Rfl:   •  niacin (SLO-NIACIN) 500 MG CR tablet, Take 500 mg by mouth Daily., Disp: , Rfl:   •  omeprazole (priLOSEC) 20 MG capsule, Take 20 mg by mouth Daily., Disp: , Rfl:    •  oxyCODONE (ROXICODONE) 15 MG immediate release tablet, Take 15 mg by mouth 4 (Four) Times a Day As Needed for Moderate Pain ., Disp: , Rfl:   •  rivaroxaban (XARELTO) 20 MG tablet, Take 20 mg by mouth every night at bedtime. AFIB, Disp: , Rfl:   •  rosuvastatin (CRESTOR) 10 MG tablet, Take 10 mg by mouth Every Night., Disp: , Rfl:   •  sacubitril-valsartan (ENTRESTO) 24-26 MG tablet, Take 1 tablet by mouth Every 12 (Twelve) Hours., Disp: 60 tablet, Rfl: 0  •  sotalol (BETAPACE) 80 MG tablet, Take 1 tablet by mouth Every 12 (Twelve) Hours., Disp: 60 tablet, Rfl: 0  •  tamsulosin (FLOMAX) 0.4 MG capsule 24 hr capsule, Take 1 capsule by mouth Daily., Disp: , Rfl:   •  thiamine (VITAMIN B-1) 100 MG tablet  tablet, Take 100 mg by mouth 2 (two) times a day., Disp: , Rfl:   •  tiZANidine (ZANAFLEX) 4 MG tablet, Take 4 mg by mouth 2 (Two) Times a Day As Needed for Muscle Spasms (muscle pain / tightness)., Disp: , Rfl:   •  traZODone (DESYREL) 100 MG tablet, Take 100 mg by mouth Every Night., Disp: , Rfl:       The following portions of the patient's history were reviewed and updated as appropriate: allergies, current medications, past family history, past medical history, past social history, past surgical history and problem list.    Social History     Tobacco Use   • Smoking status: Current Every Day Smoker     Packs/day: 1.00     Years: 10.00     Pack years: 10.00     Types: Cigarettes   Substance Use Topics   • Alcohol use: Yes   • Drug use: No       ROS    Objective   Vitals:    07/21/21 1502   Temp: 98.7 °F (37.1 °C)     There is no height or weight on file to calculate BMI.        Physical Exam    Lab Results   Component Value Date     06/19/2021    K 3.8 06/19/2021     06/19/2021    CO2 28.4 06/19/2021    BUN 7 06/19/2021    CREATININE 0.54 (L) 06/19/2021    GLUCOSE 124 (H) 06/19/2021    CALCIUM 8.3 (L) 06/19/2021    AST 88 (H) 06/15/2021    ALT 48 (H) 06/15/2021    ALKPHOS 118 (H) 06/15/2021      Lab Results   Component Value Date    CKTOTAL 61 06/15/2021     Lab Results   Component Value Date    WBC 5.62 06/20/2021    HGB 12.6 (L) 06/20/2021    HCT 40.2 06/20/2021     06/20/2021     Lab Results   Component Value Date    INR 1.13 (H) 06/17/2021    INR 1.63 (H) 08/15/2017     Lab Results   Component Value Date    MG 2.0 06/17/2021     Lab Results   Component Value Date    TSH 0.559 06/15/2021      No results found for: BNP    During this visit the following were done:  Labs Reviewed [x]    Labs Ordered []    Radiology Reports Reviewed [x]    Radiology Ordered []    PCP Records Reviewed []    Referring Provider Records Reviewed []    ER Records Reviewed [x]    Hospital Records Reviewed [x]    History Obtained From Family []    Radiology Images Reviewed [x]    Other Reviewed [x]    Records Requested []      Procedures      Assessment/Plan   No diagnosis found.             Recommendations:       No follow-ups on file.    As always, Radames Melo, ALECIA  I appreciate very much the opportunity to participate in the cardiovascular care of your patients. Please do not hesitate to call me with any questions with regards to Adriano Henry evaluation and management.           With Best Regards,        Shashank Mendes MD, [facility]    Please note that portions of this note were completed with a voice recognition program.

## 2021-07-22 ENCOUNTER — TELEPHONE (OUTPATIENT)
Dept: CARDIOLOGY | Facility: CLINIC | Age: 51
End: 2021-07-22

## 2021-07-22 RX ORDER — SOTALOL HYDROCHLORIDE 80 MG/1
80 TABLET ORAL EVERY 12 HOURS SCHEDULED
Qty: 60 TABLET | Refills: 3 | Status: SHIPPED | OUTPATIENT
Start: 2021-07-22 | End: 2021-11-22

## 2021-07-22 NOTE — TELEPHONE ENCOUNTER
Patient said they was put on Beta paste when he was in the hospital and he is out. He wants to know if he needs to keep taking it or not and if so will Mark be the one who needs to call it in?     Thanks!

## 2021-10-25 RX ORDER — NITROGLYCERIN 0.4 MG/1
TABLET SUBLINGUAL
Qty: 25 TABLET | Refills: 4 | Status: ON HOLD | OUTPATIENT
Start: 2021-10-25 | End: 2022-07-25

## 2021-11-22 RX ORDER — SOTALOL HYDROCHLORIDE 80 MG/1
TABLET ORAL
Qty: 60 TABLET | Refills: 3 | Status: SHIPPED | OUTPATIENT
Start: 2021-11-22 | End: 2021-12-01 | Stop reason: SDUPTHER

## 2021-12-01 RX ORDER — SPIRONOLACTONE 25 MG/1
25 TABLET ORAL DAILY
Qty: 30 TABLET | Refills: 0 | Status: SHIPPED | OUTPATIENT
Start: 2021-12-01 | End: 2022-01-12

## 2021-12-01 RX ORDER — SOTALOL HYDROCHLORIDE 80 MG/1
80 TABLET ORAL EVERY 12 HOURS
Qty: 60 TABLET | Refills: 0 | Status: SHIPPED | OUTPATIENT
Start: 2021-12-01 | End: 2022-01-12

## 2021-12-01 RX ORDER — SACUBITRIL AND VALSARTAN 49; 51 MG/1; MG/1
1 TABLET, FILM COATED ORAL 2 TIMES DAILY
Qty: 180 TABLET | Refills: 2 | Status: SHIPPED | OUTPATIENT
Start: 2021-12-01 | End: 2022-01-12

## 2022-01-12 ENCOUNTER — OFFICE VISIT (OUTPATIENT)
Dept: CARDIOLOGY | Facility: CLINIC | Age: 52
End: 2022-01-12

## 2022-01-12 VITALS
SYSTOLIC BLOOD PRESSURE: 85 MMHG | OXYGEN SATURATION: 98 % | DIASTOLIC BLOOD PRESSURE: 58 MMHG | TEMPERATURE: 96.8 F | BODY MASS INDEX: 45.1 KG/M2 | HEIGHT: 70 IN | HEART RATE: 71 BPM | WEIGHT: 315 LBS

## 2022-01-12 DIAGNOSIS — I48.0 PAROXYSMAL ATRIAL FIBRILLATION: Primary | ICD-10-CM

## 2022-01-12 DIAGNOSIS — I50.20 HEART FAILURE WITH REDUCED EJECTION FRACTION: ICD-10-CM

## 2022-01-12 PROCEDURE — 93000 ELECTROCARDIOGRAM COMPLETE: CPT | Performed by: PHYSICIAN ASSISTANT

## 2022-01-12 PROCEDURE — 99214 OFFICE O/P EST MOD 30 MIN: CPT | Performed by: PHYSICIAN ASSISTANT

## 2022-01-12 RX ORDER — SPIRONOLACTONE 25 MG/1
25 TABLET ORAL DAILY
Qty: 30 TABLET | Refills: 0 | Status: SHIPPED | OUTPATIENT
Start: 2022-01-12 | End: 2022-05-26

## 2022-01-12 RX ORDER — SACUBITRIL AND VALSARTAN 24; 26 MG/1; MG/1
1 TABLET, FILM COATED ORAL 2 TIMES DAILY
Qty: 60 TABLET | Refills: 3 | Status: SHIPPED | OUTPATIENT
Start: 2022-01-12 | End: 2022-05-12

## 2022-01-12 NOTE — PROGRESS NOTES
Radames Melo, ALECIA  Adriano Henry  1970 01/12/2022    Patient Active Problem List   Diagnosis   • Cellulitis and abscess of lower extremity   • Hematoma   • Paroxysmal atrial fibrillation (HCC)   • Heart failure with reduced ejection fraction (HCC)       Dear Radames Melo APRN:    Subjective     History of Present Illness:    Chief Complaint   Patient presents with   • Follow-up     routine    • Med Management     verbal        Adriano Henry is a pleasant 51 y.o. male with a past medical history significant for chronic HFrEF and cardiomyopathy with LVEF of 40 to 45% as well as grade 2 HFpEF.  Although he does not have any confirmed coronary artery disease he has had chest pains in the past with an abnormal stress test showing a medium size infarct previously.  He does also have paroxysmal atrial fibrillation that is anticoagulated with xarelto. He comes in today for cardiology follow up.     clinically Adriano has no complaints today. He does deny any chest pains, shortness of breath, orthopnea, PND, or worsening pedal edema. He also denies any palpitations where he felt he was in atrial fibrillation, even though, he is in atrial fibrillation today on EKG. He is also significantly hypotensive today but thankfully completely asymptomatic. He denies any bleeding issues with carelto.      No Known Allergies:      Current Outpatient Medications:   •  albuterol (PROVENTIL HFA;VENTOLIN HFA) 108 (90 Base) MCG/ACT inhaler, Inhale 2 puffs Every 6 (Six) Hours As Needed for Shortness of Air., Disp: , Rfl:   •  allopurinol (ZYLOPRIM) 300 MG tablet, Take 300 mg by mouth Daily., Disp: , Rfl:   •  ARIPiprazole (ABILIFY) 10 MG tablet, Take 10 mg by mouth Daily., Disp: , Rfl:   •  azelastine (ASTELIN) 0.1 % nasal spray, 1 spray into the nostril(s) as directed by provider 2 (Two) Times a Day. Use in each nostril as directed, Disp: , Rfl:   •  busPIRone (BUSPAR) 7.5 MG tablet, Take 7.5 mg by mouth 2  (two) times a day., Disp: , Rfl:   •  colchicine 0.6 MG tablet, Take 1 tablet by mouth 2 (Two) Times a Day As Needed for Muscle / Joint Pain (gout flare)., Disp: , Rfl:   •  DULoxetine (CYMBALTA) 60 MG capsule, Take 60 mg by mouth 2 (Two) Times a Day., Disp: , Rfl:   •  gabapentin (NEURONTIN) 400 MG capsule, Take 400 mg by mouth 3 (Three) Times a Day., Disp: , Rfl:   •  levETIRAcetam (KEPPRA) 750 MG tablet, Take 1,500 mg by mouth 2 (Two) Times a Day., Disp: , Rfl:   •  metFORMIN (GLUCOPHAGE) 500 MG tablet, Take 500 mg by mouth Daily., Disp: , Rfl:   •  multivitamin with minerals (DAILY-KINGA MENS FORMULA PO), Take 1 tablet by mouth Daily., Disp: , Rfl:   •  niacin (SLO-NIACIN) 500 MG CR tablet, Take 500 mg by mouth Daily., Disp: , Rfl:   •  nitroglycerin (NITROSTAT) 0.4 MG SL tablet, PLACE 1 TABLET UNDER TONGUE EVERY 5 MINUTES AS NEEDED FOR CHEST PAIN *AFTER 3 DOSES GO TO THE ER*, Disp: 25 tablet, Rfl: 4  •  omeprazole (priLOSEC) 20 MG capsule, Take 20 mg by mouth Daily., Disp: , Rfl:   •  oxyCODONE (ROXICODONE) 15 MG immediate release tablet, Take 15 mg by mouth 4 (Four) Times a Day As Needed for Moderate Pain ., Disp: , Rfl:   •  rivaroxaban (XARELTO) 20 MG tablet, Take 20 mg by mouth every night at bedtime. AFIB, Disp: , Rfl:   •  rosuvastatin (CRESTOR) 10 MG tablet, Take 10 mg by mouth Every Night., Disp: , Rfl:   •  spironolactone (ALDACTONE) 25 MG tablet, Take 1 tablet by mouth Daily., Disp: 30 tablet, Rfl: 0  •  tamsulosin (FLOMAX) 0.4 MG capsule 24 hr capsule, Take 1 capsule by mouth Daily., Disp: , Rfl:   •  thiamine (VITAMIN B-1) 100 MG tablet  tablet, Take 100 mg by mouth 2 (two) times a day., Disp: , Rfl:   •  tiZANidine (ZANAFLEX) 4 MG tablet, Take 4 mg by mouth 2 (Two) Times a Day As Needed for Muscle Spasms (muscle pain / tightness)., Disp: , Rfl:   •  traZODone (DESYREL) 100 MG tablet, Take 100 mg by mouth Every Night., Disp: , Rfl:   •  aspirin 81 MG EC tablet, Take 1 tablet by mouth Daily., Disp: 30  "tablet, Rfl: 0  •  sacubitril-valsartan (Entresto) 24-26 MG tablet, Take 1 tablet by mouth 2 (Two) Times a Day., Disp: 60 tablet, Rfl: 3    The following portions of the patient's history were reviewed and updated as appropriate: allergies, current medications, past family history, past medical history, past social history, past surgical history and problem list.    Social History     Tobacco Use   • Smoking status: Current Every Day Smoker     Packs/day: 1.00     Years: 10.00     Pack years: 10.00     Types: Cigarettes   • Smokeless tobacco: Not on file   Substance Use Topics   • Alcohol use: Yes   • Drug use: No         Objective   Vitals:    01/12/22 1459   BP: (!) 85/58   Pulse: 71   Temp: 96.8 °F (36 °C)   SpO2: 98%   Weight: (!) 183 kg (403 lb 6.4 oz)   Height: 177.8 cm (70\")     Body mass index is 57.88 kg/m².    Constitutional:       General: Not in acute distress.     Appearance: Healthy appearance. Well-developed and not in distress. Not diaphoretic.   Eyes:      Conjunctiva/sclera: Conjunctivae normal.      Pupils: Pupils are equal, round, and reactive to light.   HENT:      Head: Normocephalic and atraumatic.   Neck:      Vascular: No carotid bruit or JVD.   Pulmonary:      Effort: Pulmonary effort is normal. No respiratory distress.      Breath sounds: Normal breath sounds.   Cardiovascular:      Normal rate. Irregularly irregular rhythm.   Skin:     General: Skin is cool.   Neurological:      Mental Status: Alert, oriented to person, place, and time and oriented to person, place and time.         Lab Results   Component Value Date     06/19/2021    K 3.8 06/19/2021     06/19/2021    CO2 28.4 06/19/2021    BUN 7 06/19/2021    CREATININE 0.54 (L) 06/19/2021    GLUCOSE 124 (H) 06/19/2021    CALCIUM 8.3 (L) 06/19/2021    AST 88 (H) 06/15/2021    ALT 48 (H) 06/15/2021    ALKPHOS 118 (H) 06/15/2021     Lab Results   Component Value Date    CKTOTAL 61 06/15/2021     Lab Results   Component Value " Date    WBC 5.62 06/20/2021    HGB 12.6 (L) 06/20/2021    HCT 40.2 06/20/2021     06/20/2021     Lab Results   Component Value Date    INR 1.13 (H) 06/17/2021    INR 1.63 (H) 08/15/2017     Lab Results   Component Value Date    MG 2.0 06/17/2021     Lab Results   Component Value Date    TSH 0.559 06/15/2021      No results found for: BNP    During this visit the following were done:  Labs Reviewed []    Labs Ordered []    Radiology Reports Reviewed []    Radiology Ordered []    PCP Records Reviewed []    Referring Provider Records Reviewed []    ER Records Reviewed []    Hospital Records Reviewed []    History Obtained From Family []    Radiology Images Reviewed []    Other Reviewed []    Records Requested []         ECG 12 Lead    Date/Time: 1/12/2022 3:00 PM  Performed by: Mark Durán PA-C  Authorized by: Mark Durán PA-C   Comparison: compared with previous ECG   Similar to previous ECG  Comparison to previous ECG: Patient now in atrial fibrillation with QTC of 486  Rhythm: atrial fibrillation  Conduction: right bundle branch block    Clinical impression: abnormal EKG            Assessment/Plan    Diagnosis Plan   1. Paroxysmal atrial fibrillation (HCC)     2. Heart failure with reduced ejection fraction (HCC)              Recommendations:  1. Hypotension  1. Will reduce dose of entresto back down to 24-26  2. Advised to hold this afternoons dose.   2. PAF  1. He is in atrial fibrillation today despite sotalol. I did discuss the case with Dr. Mendes, will discontinue sotalol as he is completely asymptomatic and rate controlled. Will follow up with Dr. Mendes in 3-4 weeks to discuss rate vs further rhythm control.   2. Continue xarelto and metoprolol  3. Chronic HFrEF  1. Euvolemic today. I will continue regimen mentioned above. If BP improves will start metoprolol at next visit.       No follow-ups on file.    As always, I appreciate very much the opportunity to participate in the cardiovascular  care of your patients.      With Best Regards,    Mark Durán PA-C

## 2022-03-15 RX ORDER — NITROGLYCERIN 0.4 MG/1
TABLET SUBLINGUAL
Qty: 25 TABLET | Refills: 4 | OUTPATIENT
Start: 2022-03-15

## 2022-05-12 ENCOUNTER — HOSPITAL ENCOUNTER (EMERGENCY)
Facility: HOSPITAL | Age: 52
Discharge: LEFT BEFORE DISCHARGE | End: 2022-05-12
Admitting: EMERGENCY MEDICINE

## 2022-05-12 VITALS
RESPIRATION RATE: 18 BRPM | BODY MASS INDEX: 45.1 KG/M2 | OXYGEN SATURATION: 95 % | HEART RATE: 98 BPM | DIASTOLIC BLOOD PRESSURE: 58 MMHG | TEMPERATURE: 97.8 F | HEIGHT: 70 IN | SYSTOLIC BLOOD PRESSURE: 137 MMHG | WEIGHT: 315 LBS

## 2022-05-12 PROCEDURE — 99281 EMR DPT VST MAYX REQ PHY/QHP: CPT

## 2022-05-12 RX ORDER — SOTALOL HYDROCHLORIDE 80 MG/1
TABLET ORAL
Qty: 60 TABLET | Refills: 0 | Status: SHIPPED | OUTPATIENT
Start: 2022-05-12 | End: 2022-05-19

## 2022-05-12 RX ORDER — SACUBITRIL AND VALSARTAN 24; 26 MG/1; MG/1
TABLET, FILM COATED ORAL
Qty: 60 TABLET | Refills: 3 | Status: SHIPPED | OUTPATIENT
Start: 2022-05-12 | End: 2022-05-19 | Stop reason: SDUPTHER

## 2022-05-12 NOTE — ED NOTES
MEDICAL SCREENING:    Reason for Visit: constipation    Patient initially seen in triage.  The patient was advised further evaluation and diagnostic testing will be needed, some of the treatment and testing will be initiated in the lobby in order to begin the process.  The patient will be returned to the waiting area for the time being and possibly be re-assessed by a subsequent ED provider.  The patient will be brought back to the treatment area in as timely manner as possible.         Dejon Wick II, PA  05/12/22 0058

## 2022-05-12 NOTE — ED NOTES
Pt requesting to leave; signed AMA papers and left with family. Pt states he did not want to wait any longer.

## 2022-05-19 ENCOUNTER — OFFICE VISIT (OUTPATIENT)
Dept: CARDIOLOGY | Facility: CLINIC | Age: 52
End: 2022-05-19

## 2022-05-19 VITALS
WEIGHT: 315 LBS | BODY MASS INDEX: 45.1 KG/M2 | TEMPERATURE: 98 F | DIASTOLIC BLOOD PRESSURE: 62 MMHG | HEART RATE: 66 BPM | HEIGHT: 70 IN | SYSTOLIC BLOOD PRESSURE: 113 MMHG | OXYGEN SATURATION: 98 %

## 2022-05-19 DIAGNOSIS — I50.22 CHRONIC HFREF (HEART FAILURE WITH REDUCED EJECTION FRACTION): ICD-10-CM

## 2022-05-19 DIAGNOSIS — I48.0 PAROXYSMAL ATRIAL FIBRILLATION: Primary | ICD-10-CM

## 2022-05-19 PROCEDURE — 99203 OFFICE O/P NEW LOW 30 MIN: CPT | Performed by: INTERNAL MEDICINE

## 2022-05-19 RX ORDER — SACUBITRIL AND VALSARTAN 24; 26 MG/1; MG/1
1 TABLET, FILM COATED ORAL 2 TIMES DAILY
Qty: 60 TABLET | Refills: 3 | Status: SHIPPED | OUTPATIENT
Start: 2022-05-19 | End: 2022-07-18 | Stop reason: DRUGHIGH

## 2022-05-19 RX ORDER — METOPROLOL SUCCINATE 25 MG/1
25 TABLET, EXTENDED RELEASE ORAL DAILY
Qty: 30 TABLET | Refills: 3 | Status: SHIPPED | OUTPATIENT
Start: 2022-05-19 | End: 2022-05-26 | Stop reason: DRUGHIGH

## 2022-05-19 RX ORDER — BUPRENORPHINE HYDROCHLORIDE AND NALOXONE HYDROCHLORIDE DIHYDRATE 8; 2 MG/1; MG/1
2.25 TABLET SUBLINGUAL DAILY
COMMUNITY

## 2022-05-19 NOTE — PROGRESS NOTES
Fernando Whitaker APRN  Adriano Henry  1970 05/19/2022    Patient Active Problem List   Diagnosis   • Cellulitis and abscess of lower extremity   • Hematoma   • Paroxysmal atrial fibrillation (HCC)   • Heart failure with reduced ejection fraction (HCC)       Dear Fernando Whitaker APRN:    Subjective     Adriano Henry is a 51 y.o. male with the problems as listed above, presents    Chief complaint: Follow-up of persistent atrial fibrillation and chronic HFrEF.    History of Present Illness: Mr. Henry is a pleasant 49-year-old  male with history of persistent atrial fibrillation and chronic heart failure with reduced ejection fraction.  He is here for regular cardiology follow-up.  On today's visit he denies any complaints of significant shortness of breath, palpitations or dizziness.  He denies any bleeding problems from Xarelto.  He denies any chest pains.    No Known Allergies:      Current Outpatient Medications:   •  albuterol (PROVENTIL HFA;VENTOLIN HFA) 108 (90 Base) MCG/ACT inhaler, Inhale 2 puffs Every 6 (Six) Hours As Needed for Shortness of Air., Disp: , Rfl:   •  allopurinol (ZYLOPRIM) 300 MG tablet, Take 300 mg by mouth Daily., Disp: , Rfl:   •  ARIPiprazole (ABILIFY) 10 MG tablet, Take 10 mg by mouth Daily., Disp: , Rfl:   •  aspirin 81 MG EC tablet, Take 1 tablet by mouth Daily., Disp: 30 tablet, Rfl: 0  •  azelastine (ASTELIN) 0.1 % nasal spray, 1 spray into the nostril(s) as directed by provider 2 (Two) Times a Day. Use in each nostril as directed, Disp: , Rfl:   •  buprenorphine-naloxone (SUBOXONE) 8-2 MG per SL tablet, Place 1 tablet under the tongue Daily., Disp: , Rfl:   •  busPIRone (BUSPAR) 7.5 MG tablet, Take 7.5 mg by mouth 2 (two) times a day., Disp: , Rfl:   •  colchicine 0.6 MG tablet, Take 1 tablet by mouth 2 (Two) Times a Day As Needed for Muscle / Joint Pain (gout flare)., Disp: , Rfl:   •  DULoxetine (CYMBALTA) 60 MG capsule, Take 60 mg by mouth 2 (Two) Times a Day.,  Disp: , Rfl:   •  Entresto 24-26 MG tablet, TAKE ONE TABLET BY MOUTH TWICE A DAY, Disp: 60 tablet, Rfl: 3  •  gabapentin (NEURONTIN) 400 MG capsule, Take 400 mg by mouth 3 (Three) Times a Day., Disp: , Rfl:   •  levETIRAcetam (KEPPRA) 750 MG tablet, Take 1,500 mg by mouth 2 (Two) Times a Day., Disp: , Rfl:   •  metFORMIN (GLUCOPHAGE) 500 MG tablet, Take 500 mg by mouth Daily., Disp: , Rfl:   •  multivitamin with minerals tablet tablet, Take 1 tablet by mouth Daily., Disp: , Rfl:   •  niacin (SLO-NIACIN) 500 MG CR tablet, Take 500 mg by mouth Daily., Disp: , Rfl:   •  nitroglycerin (NITROSTAT) 0.4 MG SL tablet, PLACE 1 TABLET UNDER TONGUE EVERY 5 MINUTES AS NEEDED FOR CHEST PAIN *AFTER 3 DOSES GO TO THE ER*, Disp: 25 tablet, Rfl: 4  •  omeprazole (priLOSEC) 20 MG capsule, Take 20 mg by mouth Daily., Disp: , Rfl:   •  rivaroxaban (XARELTO) 20 MG tablet, Take 20 mg by mouth every night at bedtime. AFIB, Disp: , Rfl:   •  rosuvastatin (CRESTOR) 10 MG tablet, Take 10 mg by mouth Every Night., Disp: , Rfl:   •  spironolactone (ALDACTONE) 25 MG tablet, Take 1 tablet by mouth Daily., Disp: 30 tablet, Rfl: 0  •  tamsulosin (FLOMAX) 0.4 MG capsule 24 hr capsule, Take 1 capsule by mouth Daily., Disp: , Rfl:   •  thiamine (VITAMIN B-1) 100 MG tablet  tablet, Take 100 mg by mouth 2 (two) times a day., Disp: , Rfl:   •  tiZANidine (ZANAFLEX) 4 MG tablet, Take 4 mg by mouth 2 (Two) Times a Day As Needed for Muscle Spasms (muscle pain / tightness)., Disp: , Rfl:   •  traZODone (DESYREL) 100 MG tablet, Take 100 mg by mouth Every Night., Disp: , Rfl:   •  metoprolol succinate XL (TOPROL-XL) 25 MG 24 hr tablet, Take 1 tablet by mouth Daily., Disp: 30 tablet, Rfl: 3      The following portions of the patient's history were reviewed and updated as appropriate: allergies, current medications, past family history, past medical history, past social history, past surgical history and problem list.    Social History     Tobacco Use   •  "Smoking status: Current Every Day Smoker     Packs/day: 1.00     Years: 10.00     Pack years: 10.00     Types: Cigarettes   Substance Use Topics   • Alcohol use: Yes   • Drug use: No       Review of Systems   Constitutional: Negative for chills and fever.   HENT: Negative for nosebleeds and sore throat.    Respiratory: Negative for cough, hemoptysis and wheezing.    Gastrointestinal: Negative for abdominal pain, hematemesis, hematochezia, melena, nausea and vomiting.   Genitourinary: Negative for dysuria and hematuria.   Neurological: Negative for headaches.       Objective   Vitals:    05/19/22 1413   BP: 113/62   BP Location: Right arm   Patient Position: Sitting   Cuff Size: Adult   Pulse: 66   Temp: 98 °F (36.7 °C)   TempSrc: Infrared   SpO2: 98%   Weight: (!) 220 kg (485 lb)   Height: 177.8 cm (70\")     Body mass index is 69.59 kg/m².    Vitals reviewed.   Constitutional:       Appearance: Well-developed.   Eyes:      Conjunctiva/sclera: Conjunctivae normal.   HENT:      Head: Normocephalic.   Neck:      Thyroid: No thyromegaly.      Vascular: No JVD.      Trachea: No tracheal deviation.   Pulmonary:      Effort: No respiratory distress.      Breath sounds: Normal breath sounds. No wheezing. No rales.   Cardiovascular:      PMI at left midclavicular line. Normal rate. Regular rhythm. Normal S1. Normal S2.      Murmurs: There is no murmur.      No gallop. No click. No rub.   Pulses:     Intact distal pulses.   Edema:     Pretibial: bilateral 1+ edema of the pretibial area.     Ankle: bilateral 1+ edema of the ankle.     Feet: bilateral 1+ edema of the feet.  Abdominal:      General: Bowel sounds are normal.      Palpations: Abdomen is soft. There is no abdominal mass.      Tenderness: There is no abdominal tenderness.   Musculoskeletal:      Cervical back: Normal range of motion and neck supple. Skin:     General: Skin is warm and dry.   Neurological:      Mental Status: Alert and oriented to person, place, and " time.      Cranial Nerves: No cranial nerve deficit.         Lab Results   Component Value Date     06/19/2021    K 3.8 06/19/2021     06/19/2021    CO2 28.4 06/19/2021    BUN 7 06/19/2021    CREATININE 0.54 (L) 06/19/2021    GLUCOSE 124 (H) 06/19/2021    CALCIUM 8.3 (L) 06/19/2021    AST 88 (H) 06/15/2021    ALT 48 (H) 06/15/2021    ALKPHOS 118 (H) 06/15/2021     Lab Results   Component Value Date    CKTOTAL 61 06/15/2021     Lab Results   Component Value Date    WBC 5.62 06/20/2021    HGB 12.6 (L) 06/20/2021    HCT 40.2 06/20/2021     06/20/2021     Lab Results   Component Value Date    INR 1.13 (H) 06/17/2021    INR 1.63 (H) 08/15/2017     Lab Results   Component Value Date    MG 2.0 06/17/2021     Lab Results   Component Value Date    TSH 0.559 06/15/2021        Assessment & Plan    Diagnosis Plan   1. Paroxysmal atrial fibrillation (HCC)     2. Chronic HFrEF (heart failure with reduced ejection fraction), appears compensated.         Recommendations:  1. Since sotalol was not effective, we may consider of dofetilide.  I will have the office staff to check on the cost of this and if is this is affordable then we will consider initiating this with observation in the hospital for 72 hours.  I have discussed this with Mr. Henry and he is agreeable.  2. For now will add metoprolol succinate 25 mg daily.    Return in about 4 weeks (around 6/16/2022) for or sooner if needed.    As always, Fernando Whitaker, ALECIA  I appreciate very much the opportunity to participate in the cardiovascular care of your patients. Please do not hesitate to call me with any questions with regards to Adriano Henry's evaluation and management.       With Best Regards,        Shashank Mendes MD, [facility]    Please note that portions of this note were completed with a voice recognition program.

## 2022-05-25 ENCOUNTER — TELEPHONE (OUTPATIENT)
Dept: CARDIOLOGY | Facility: CLINIC | Age: 52
End: 2022-05-25

## 2022-05-25 NOTE — TELEPHONE ENCOUNTER
Patient has some questions about how he needs to be taking his medication. He wants to know how much of the metorprolol he needs to be taking?     Thanks!

## 2022-05-25 NOTE — TELEPHONE ENCOUNTER
Pts wife cb stating they are wanting to try the medication the provider recommended since it is covered by ins, they are asking for a cb

## 2022-05-25 NOTE — TELEPHONE ENCOUNTER
Pt's wife called and stated that Adriano is already taking Metoprolol 100 mg QD and has been for awhile now. She stated that his previous PCP started him on it.  She was also checking on if the Tikosyn was covered.     Called pt's pharmacy and Tikosyn is covered by his insurance and they are faxing over his med list.     He has been on Metoprolol Succ 100 mg QD since 1/1/19 Dr. Barger was who prescribed it.

## 2022-05-26 NOTE — TELEPHONE ENCOUNTER
Spoke with Mark and he stated to have them to continue 100 mg and to make their follow up with  so he can discuss Tikosyn.   Advised pt and advised his wife. She expressed understanding. Made pt an appt with  on 07/18/2022 at 10:45 am.

## 2022-06-09 RX ORDER — SOTALOL HYDROCHLORIDE 80 MG/1
TABLET ORAL
Qty: 60 TABLET | Refills: 0 | OUTPATIENT
Start: 2022-06-09

## 2022-07-18 ENCOUNTER — OFFICE VISIT (OUTPATIENT)
Dept: CARDIOLOGY | Facility: CLINIC | Age: 52
End: 2022-07-18

## 2022-07-18 VITALS
SYSTOLIC BLOOD PRESSURE: 90 MMHG | HEIGHT: 70 IN | WEIGHT: 315 LBS | OXYGEN SATURATION: 98 % | HEART RATE: 82 BPM | DIASTOLIC BLOOD PRESSURE: 59 MMHG | BODY MASS INDEX: 45.1 KG/M2

## 2022-07-18 DIAGNOSIS — R07.2 PRECORDIAL PAIN: ICD-10-CM

## 2022-07-18 DIAGNOSIS — I25.5 ISCHEMIC CARDIOMYOPATHY: ICD-10-CM

## 2022-07-18 DIAGNOSIS — I48.11 LONGSTANDING PERSISTENT ATRIAL FIBRILLATION: Primary | ICD-10-CM

## 2022-07-18 DIAGNOSIS — Z72.0 TOBACCO ABUSE: ICD-10-CM

## 2022-07-18 PROCEDURE — 99214 OFFICE O/P EST MOD 30 MIN: CPT | Performed by: INTERNAL MEDICINE

## 2022-07-18 PROCEDURE — 93000 ELECTROCARDIOGRAM COMPLETE: CPT | Performed by: INTERNAL MEDICINE

## 2022-07-18 RX ORDER — ASPIRIN 81 MG/1
81 TABLET ORAL DAILY
Qty: 30 TABLET | Refills: 5 | Status: SHIPPED | OUTPATIENT
Start: 2022-07-18

## 2022-07-18 RX ORDER — SACUBITRIL AND VALSARTAN 49; 51 MG/1; MG/1
1 TABLET, FILM COATED ORAL 2 TIMES DAILY
Qty: 60 TABLET | Refills: 3 | Status: SHIPPED | OUTPATIENT
Start: 2022-07-18 | End: 2022-09-16 | Stop reason: DRUGHIGH

## 2022-07-18 RX ORDER — ISOSORBIDE MONONITRATE 60 MG/1
60 TABLET, EXTENDED RELEASE ORAL EVERY MORNING
Qty: 30 TABLET | Refills: 3 | Status: SHIPPED | OUTPATIENT
Start: 2022-07-18 | End: 2022-11-03

## 2022-07-18 NOTE — PROGRESS NOTES
Fernando Whitaker APRN  Adriano Henry  1970 07/18/2022    Patient Active Problem List   Diagnosis   • Cellulitis and abscess of lower extremity   • Hematoma   • Paroxysmal atrial fibrillation (HCC)   • Heart failure with reduced ejection fraction (HCC)       Dear Fernando Whitaker APRN:    Subjective     Adriano Henry is a 51 y.o. male with the problems as listed above, presents    Chief complaint: Follow-up of persistent atrial fibrillation, chronic HFrEF.    History of Present Illness: Mr. Henry is a pleasant 49-year-old  male with persistent atrial fibrillation and cardiomyopathy with LV ejection fraction of about 41 to 45% with chronic HFrEF, is here for discussion regarding initiation of Tikosyn therapy for his persistent atrial fibrillation.  On today's visit he denies any significant dyspnea.  He has chronic bilateral leg edema.  He has some intermittent chest pains which she describes as dull pains in the epigastric region with radiation up into the chest.  These seem to occur at random and resolve with several nitroglycerin.  His last nuclear stress test in June 2021 revealed medium size infarct in the inferior wall and LV apex with no evidence of significant myocardial ischemia.  His weight is still around 438 although he has lost about 50 pounds since 5/19/2022.  Patient denies any dizziness.          No Known Allergies:      Current Outpatient Medications:   •  albuterol (PROVENTIL HFA;VENTOLIN HFA) 108 (90 Base) MCG/ACT inhaler, Inhale 2 puffs Every 6 (Six) Hours As Needed for Shortness of Air., Disp: , Rfl:   •  allopurinol (ZYLOPRIM) 300 MG tablet, Take 300 mg by mouth Daily., Disp: , Rfl:   •  ARIPiprazole (ABILIFY) 10 MG tablet, Take 10 mg by mouth Daily., Disp: , Rfl:   •  azelastine (ASTELIN) 0.1 % nasal spray, 1 spray into the nostril(s) as directed by provider 2 (Two) Times a Day. Use in each nostril as directed, Disp: , Rfl:   •  budesonide-formoterol (SYMBICORT) 160-4.5  MCG/ACT inhaler, Inhale 2 puffs 2 (Two) Times a Day., Disp: , Rfl:   •  buprenorphine-naloxone (SUBOXONE) 8-2 MG per SL tablet, Place 1 tablet under the tongue Daily., Disp: , Rfl:   •  cloNIDine (CATAPRES) 0.2 MG tablet, Take 0.2 mg by mouth 2 (Two) Times a Day., Disp: , Rfl:   •  colchicine 0.6 MG tablet, Take 1 tablet by mouth 2 (Two) Times a Day As Needed for Muscle / Joint Pain (gout flare)., Disp: , Rfl:   •  DULoxetine (CYMBALTA) 60 MG capsule, Take 60 mg by mouth 2 (Two) Times a Day., Disp: , Rfl:   •  furosemide (LASIX) 40 MG tablet, Take 40 mg by mouth As Needed., Disp: , Rfl:   •  gabapentin (NEURONTIN) 400 MG capsule, Take 400 mg by mouth 3 (Three) Times a Day., Disp: , Rfl:   •  levETIRAcetam (KEPPRA) 750 MG tablet, Take 1,500 mg by mouth 2 (Two) Times a Day., Disp: , Rfl:   •  meloxicam (MOBIC) 15 MG tablet, Take 15 mg by mouth Daily., Disp: , Rfl:   •  metFORMIN (GLUCOPHAGE) 500 MG tablet, Take 500 mg by mouth Daily., Disp: , Rfl:   •  metoprolol succinate XL (TOPROL-XL) 100 MG 24 hr tablet, Take 100 mg by mouth Daily., Disp: , Rfl:   •  multivitamin with minerals tablet tablet, Take 1 tablet by mouth Daily., Disp: , Rfl:   •  niacin (SLO-NIACIN) 500 MG CR tablet, Take 500 mg by mouth Daily., Disp: , Rfl:   •  nitroglycerin (NITROSTAT) 0.4 MG SL tablet, PLACE 1 TABLET UNDER TONGUE EVERY 5 MINUTES AS NEEDED FOR CHEST PAIN *AFTER 3 DOSES GO TO THE ER*, Disp: 25 tablet, Rfl: 4  •  omeprazole (priLOSEC) 20 MG capsule, Take 20 mg by mouth Daily., Disp: , Rfl:   •  potassium chloride 10 MEQ CR tablet, Take 10 mEq by mouth 2 (Two) Times a Day., Disp: , Rfl:   •  rivaroxaban (XARELTO) 20 MG tablet, Take 1 tablet by mouth every night at bedtime. AFIB, Disp: 30 tablet, Rfl: 5  •  rosuvastatin (CRESTOR) 10 MG tablet, Take 10 mg by mouth Every Night., Disp: , Rfl:   •  tamsulosin (FLOMAX) 0.4 MG capsule 24 hr capsule, Take 1 capsule by mouth Daily., Disp: , Rfl:   •  tamsulosin (FLOMAX) 0.4 MG capsule 24 hr  "capsule, Take 1 capsule by mouth Daily., Disp: , Rfl:   •  tiZANidine (ZANAFLEX) 4 MG tablet, Take 4 mg by mouth 2 (Two) Times a Day As Needed for Muscle Spasms (muscle pain / tightness)., Disp: , Rfl:   •  traZODone (DESYREL) 100 MG tablet, Take 100 mg by mouth Every Night., Disp: , Rfl:   •  aspirin (aspirin) 81 MG EC tablet, Take 1 tablet by mouth Daily., Disp: 30 tablet, Rfl: 5  •  doxycycline (VIBRAMYCIN) 100 MG capsule, Take 100 mg by mouth 2 (Two) Times a Day., Disp: , Rfl:   •  isosorbide mononitrate (IMDUR) 60 MG 24 hr tablet, Take 1 tablet by mouth Every Morning., Disp: 30 tablet, Rfl: 3  •  polyethylene glycol (MIRALAX) 17 g packet, Take 17 g by mouth Daily., Disp: , Rfl:   •  sacubitril-valsartan (Entresto) 49-51 MG tablet, Take 1 tablet by mouth 2 (Two) Times a Day., Disp: 60 tablet, Rfl: 3      The following portions of the patient's history were reviewed and updated as appropriate: allergies, current medications, past family history, past medical history, past social history, past surgical history and problem list.    Social History     Tobacco Use   • Smoking status: Current Every Day Smoker     Packs/day: 1.00     Years: 10.00     Pack years: 10.00     Types: Cigarettes   Substance Use Topics   • Alcohol use: Yes   • Drug use: No       Review of Systems   Constitutional: Negative for chills and fever.   HENT: Negative for nosebleeds and sore throat.    Cardiovascular: Positive for chest pain.   Respiratory: Negative for cough, hemoptysis and wheezing.    Gastrointestinal: Negative for abdominal pain, hematemesis, hematochezia, melena, nausea and vomiting.   Genitourinary: Negative for dysuria and hematuria.   Neurological: Negative for headaches.     Objective   Vitals:    07/18/22 0935   BP: 90/59   Pulse: 82   SpO2: 98%   Weight: (!) 199 kg (438 lb)   Height: 177.8 cm (70\")     Body mass index is 62.85 kg/m².    Constitutional:       Appearance: Well-developed.   Eyes:      Pupils: Pupils are " equal, round, and reactive to light.   Neck:      Thyroid: No thyromegaly.      Vascular: No JVD.      Trachea: No tracheal deviation.      Lymphadenopathy: No cervical adenopathy.   Pulmonary:      Effort: Pulmonary effort is normal.      Breath sounds: Normal breath sounds.   Cardiovascular:      PMI at left midclavicular line. Normal rate. Irregularly irregular rhythm. Normal S1. Normal S2.      Murmurs: There is no murmur.      No gallop. No click. No rub.   Pulses:     Intact distal pulses.   Edema:     Pretibial: 1+ edema of the right pretibial area.     Ankle: 1+ edema of the right ankle.     Feet: 1+ edema of the right foot.  Abdominal:      General: Bowel sounds are normal.      Palpations: Abdomen is soft. There is no abdominal mass.      Tenderness: There is no abdominal tenderness.   Musculoskeletal: Normal range of motion.      Cervical back: Neck supple. Skin:     General: Skin is warm and dry.      Findings: No rash.   Neurological:      Mental Status: Alert and oriented to person, place, and time.         Lab Results   Component Value Date     06/19/2021    K 3.8 06/19/2021     06/19/2021    CO2 28.4 06/19/2021    BUN 7 06/19/2021    CREATININE 0.54 (L) 06/19/2021    GLUCOSE 124 (H) 06/19/2021    CALCIUM 8.3 (L) 06/19/2021    AST 88 (H) 06/15/2021    ALT 48 (H) 06/15/2021    ALKPHOS 118 (H) 06/15/2021     Lab Results   Component Value Date    CKTOTAL 61 06/15/2021     Lab Results   Component Value Date    WBC 5.62 06/20/2021    HGB 12.6 (L) 06/20/2021    HCT 40.2 06/20/2021     06/20/2021     Lab Results   Component Value Date    INR 1.13 (H) 06/17/2021    INR 1.63 (H) 08/15/2017     Lab Results   Component Value Date    MG 2.0 06/17/2021     Lab Results   Component Value Date    TSH 0.559 06/15/2021        ECG 12 Lead    Date/Time: 7/18/2022 9:39 AM  Performed by: Shashank Mendes MD  Authorized by: Shashank Mendes MD   Comparison: compared with previous ECG from  1/12/2022  Similar to previous ECG  Rhythm: atrial fibrillation  BPM: 62  Conduction: right bundle branch block  Other findings: non-specific ST-T wave changes              Assessment & Plan :   Diagnosis Plan   1. Longstanding persistent atrial fibrillation (HCC)     2. Ischemic cardiomyopathy with LV ejection fraction of 41 to 45%, appears compensated.     3. Precordial pain     4. Tobacco abuse          Recommendations:  1. For his chest pains, will add enteric-coated aspirin 81 mg daily.  2. Continue with Xarelto for stroke prevention.  3. Add Imdur 60 mg daily for symptom relief of angina.  4. If he has recurrent/persistent angina then may consider further cardiac evaluation with cardiac catheterization although this may be a technically difficult given his body weight.  5. I have strongly reemphasized for him to quit smoking.  He said he is trying to quit and is down to half pack a day from 2 packs a day that he was smoking before.  6. I discussed with him and his wife about initiating Tikosyn which apparently he can get without co-pay.  I told him that he would need to be admitted to the hospital for 3 days for close cardiac monitoring while he gets started on Tikosyn therapy.  He is agreeable.  We will schedule this for next week.  7. Continue with Entresto and metoprolol succinate for his cardiomyopathy.  8. I told him to cut back on the clonidine to 0.1 mg (half of 0.2 mg) twice a day if the blood pressure is running less than 110 on the top.  9. I reminded patient to discontinue lisinopril since he is on Entresto.  10. Will discontinue the clonidine and perhaps optimize the dose of Entresto to help with his cardiomyopathy.  So we will increase the dose to 49/51 mg p.o. twice daily.    Return in about 5 weeks (around 8/22/2022).    As always, Fernando Whitaker APRN  I appreciate very much the opportunity to participate in the cardiovascular care of your patients. Please do not hesitate to call me with any  questions with regards to Adriano Henry evaluation and management.       With Best Regards,        Shashank Mendes MD, FACC    Please note that portions of this note were completed with a voice recognition program.

## 2022-07-21 ENCOUNTER — TELEPHONE (OUTPATIENT)
Dept: CARDIOLOGY | Facility: CLINIC | Age: 52
End: 2022-07-21

## 2022-07-21 NOTE — TELEPHONE ENCOUNTER
Called House Supervisor at [facility] to get bed for Monday to initiate Tikosyn. They wrote down the patient but we will have to call on Monday morinng to ensure they have a bed

## 2022-07-22 NOTE — TELEPHONE ENCOUNTER
Spoke with patient's wife she is aware.  They will have to go early in the morning and if bed is still available

## 2022-07-25 ENCOUNTER — HOSPITAL ENCOUNTER (INPATIENT)
Facility: HOSPITAL | Age: 52
LOS: 2 days | Discharge: HOME HEALTH | End: 2022-07-27
Attending: INTERNAL MEDICINE | Admitting: INTERNAL MEDICINE

## 2022-07-25 PROBLEM — I48.19 ATRIAL FIBRILLATION, PERSISTENT (HCC): Status: ACTIVE | Noted: 2022-07-25

## 2022-07-25 LAB
ANION GAP SERPL CALCULATED.3IONS-SCNC: 9.7 MMOL/L (ref 5–15)
BASOPHILS # BLD AUTO: 0.03 10*3/MM3 (ref 0–0.2)
BASOPHILS NFR BLD AUTO: 0.6 % (ref 0–1.5)
BUN SERPL-MCNC: 14 MG/DL (ref 6–20)
BUN/CREAT SERPL: 16.9 (ref 7–25)
CALCIUM SPEC-SCNC: 9 MG/DL (ref 8.6–10.5)
CHLORIDE SERPL-SCNC: 101 MMOL/L (ref 98–107)
CO2 SERPL-SCNC: 24.3 MMOL/L (ref 22–29)
CREAT SERPL-MCNC: 0.83 MG/DL (ref 0.76–1.27)
DEPRECATED RDW RBC AUTO: 63.7 FL (ref 37–54)
EGFRCR SERPLBLD CKD-EPI 2021: 105.3 ML/MIN/1.73
EOSINOPHIL # BLD AUTO: 0.34 10*3/MM3 (ref 0–0.4)
EOSINOPHIL NFR BLD AUTO: 7.3 % (ref 0.3–6.2)
ERYTHROCYTE [DISTWIDTH] IN BLOOD BY AUTOMATED COUNT: 18.7 % (ref 12.3–15.4)
GLUCOSE SERPL-MCNC: 111 MG/DL (ref 65–99)
HCT VFR BLD AUTO: 39.2 % (ref 37.5–51)
HGB BLD-MCNC: 12.4 G/DL (ref 13–17.7)
IMM GRANULOCYTES # BLD AUTO: 0.02 10*3/MM3 (ref 0–0.05)
IMM GRANULOCYTES NFR BLD AUTO: 0.4 % (ref 0–0.5)
LYMPHOCYTES # BLD AUTO: 0.97 10*3/MM3 (ref 0.7–3.1)
LYMPHOCYTES NFR BLD AUTO: 20.8 % (ref 19.6–45.3)
MAGNESIUM SERPL-MCNC: 1.6 MG/DL (ref 1.6–2.6)
MAGNESIUM SERPL-MCNC: 2.2 MG/DL (ref 1.6–2.6)
MCH RBC QN AUTO: 29.3 PG (ref 26.6–33)
MCHC RBC AUTO-ENTMCNC: 31.6 G/DL (ref 31.5–35.7)
MCV RBC AUTO: 92.7 FL (ref 79–97)
MONOCYTES # BLD AUTO: 0.4 10*3/MM3 (ref 0.1–0.9)
MONOCYTES NFR BLD AUTO: 8.6 % (ref 5–12)
NEUTROPHILS NFR BLD AUTO: 2.9 10*3/MM3 (ref 1.7–7)
NEUTROPHILS NFR BLD AUTO: 62.3 % (ref 42.7–76)
NRBC BLD AUTO-RTO: 0 /100 WBC (ref 0–0.2)
PLATELET # BLD AUTO: 185 10*3/MM3 (ref 140–450)
PMV BLD AUTO: 11.9 FL (ref 6–12)
POTASSIUM SERPL-SCNC: 4.4 MMOL/L (ref 3.5–5.2)
RBC # BLD AUTO: 4.23 10*6/MM3 (ref 4.14–5.8)
SARS-COV-2 RNA RESP QL NAA+PROBE: NOT DETECTED
SODIUM SERPL-SCNC: 135 MMOL/L (ref 136–145)
TROPONIN T SERPL-MCNC: <0.01 NG/ML (ref 0–0.03)
WBC NRBC COR # BLD: 4.66 10*3/MM3 (ref 3.4–10.8)

## 2022-07-25 PROCEDURE — 93010 ELECTROCARDIOGRAM REPORT: CPT | Performed by: INTERNAL MEDICINE

## 2022-07-25 PROCEDURE — 94799 UNLISTED PULMONARY SVC/PX: CPT

## 2022-07-25 PROCEDURE — 93005 ELECTROCARDIOGRAM TRACING: CPT | Performed by: NURSE PRACTITIONER

## 2022-07-25 PROCEDURE — 83735 ASSAY OF MAGNESIUM: CPT | Performed by: NURSE PRACTITIONER

## 2022-07-25 PROCEDURE — 99223 1ST HOSP IP/OBS HIGH 75: CPT | Performed by: INTERNAL MEDICINE

## 2022-07-25 PROCEDURE — 94761 N-INVAS EAR/PLS OXIMETRY MLT: CPT

## 2022-07-25 PROCEDURE — 83735 ASSAY OF MAGNESIUM: CPT | Performed by: INTERNAL MEDICINE

## 2022-07-25 PROCEDURE — U0003 INFECTIOUS AGENT DETECTION BY NUCLEIC ACID (DNA OR RNA); SEVERE ACUTE RESPIRATORY SYNDROME CORONAVIRUS 2 (SARS-COV-2) (CORONAVIRUS DISEASE [COVID-19]), AMPLIFIED PROBE TECHNIQUE, MAKING USE OF HIGH THROUGHPUT TECHNOLOGIES AS DESCRIBED BY CMS-2020-01-R: HCPCS | Performed by: NURSE PRACTITIONER

## 2022-07-25 PROCEDURE — 93005 ELECTROCARDIOGRAM TRACING: CPT | Performed by: INTERNAL MEDICINE

## 2022-07-25 PROCEDURE — 85025 COMPLETE CBC W/AUTO DIFF WBC: CPT | Performed by: NURSE PRACTITIONER

## 2022-07-25 PROCEDURE — 0 MAGNESIUM SULFATE 4 GM/100ML SOLUTION

## 2022-07-25 PROCEDURE — 84484 ASSAY OF TROPONIN QUANT: CPT | Performed by: NURSE PRACTITIONER

## 2022-07-25 PROCEDURE — 94640 AIRWAY INHALATION TREATMENT: CPT

## 2022-07-25 PROCEDURE — 80048 BASIC METABOLIC PNL TOTAL CA: CPT | Performed by: NURSE PRACTITIONER

## 2022-07-25 RX ORDER — ACETAMINOPHEN 500 MG
500 TABLET ORAL EVERY 6 HOURS PRN
Status: DISCONTINUED | OUTPATIENT
Start: 2022-07-25 | End: 2022-07-27 | Stop reason: HOSPADM

## 2022-07-25 RX ORDER — GABAPENTIN 400 MG/1
400 CAPSULE ORAL EVERY 8 HOURS SCHEDULED
Status: DISCONTINUED | OUTPATIENT
Start: 2022-07-25 | End: 2022-07-27 | Stop reason: HOSPADM

## 2022-07-25 RX ORDER — SOTALOL HYDROCHLORIDE 80 MG/1
80 TABLET ORAL 2 TIMES DAILY
Status: ON HOLD | COMMUNITY
End: 2022-07-25

## 2022-07-25 RX ORDER — MAGNESIUM SULFATE HEPTAHYDRATE 40 MG/ML
4 INJECTION, SOLUTION INTRAVENOUS AS NEEDED
Status: DISCONTINUED | OUTPATIENT
Start: 2022-07-25 | End: 2022-07-27 | Stop reason: HOSPADM

## 2022-07-25 RX ORDER — ARIPIPRAZOLE 10 MG/1
10 TABLET ORAL DAILY
Status: DISCONTINUED | OUTPATIENT
Start: 2022-07-25 | End: 2022-07-27 | Stop reason: HOSPADM

## 2022-07-25 RX ORDER — MAGNESIUM SULFATE HEPTAHYDRATE 40 MG/ML
4 INJECTION, SOLUTION INTRAVENOUS ONCE
Status: COMPLETED | OUTPATIENT
Start: 2022-07-25 | End: 2022-07-25

## 2022-07-25 RX ORDER — TRAZODONE HYDROCHLORIDE 50 MG/1
100 TABLET ORAL NIGHTLY
Status: DISCONTINUED | OUTPATIENT
Start: 2022-07-25 | End: 2022-07-27 | Stop reason: HOSPADM

## 2022-07-25 RX ORDER — MAGNESIUM SULFATE HEPTAHYDRATE 40 MG/ML
2 INJECTION, SOLUTION INTRAVENOUS AS NEEDED
Status: DISCONTINUED | OUTPATIENT
Start: 2022-07-25 | End: 2022-07-27 | Stop reason: HOSPADM

## 2022-07-25 RX ORDER — FLUOXETINE HYDROCHLORIDE 20 MG/1
20 CAPSULE ORAL DAILY
Status: DISCONTINUED | OUTPATIENT
Start: 2022-07-25 | End: 2022-07-27 | Stop reason: HOSPADM

## 2022-07-25 RX ORDER — NYSTATIN 100000 [USP'U]/G
POWDER TOPICAL EVERY 12 HOURS SCHEDULED
Status: DISCONTINUED | OUTPATIENT
Start: 2022-07-25 | End: 2022-07-27 | Stop reason: HOSPADM

## 2022-07-25 RX ORDER — FUROSEMIDE 40 MG/1
40 TABLET ORAL AS NEEDED
Status: DISCONTINUED | OUTPATIENT
Start: 2022-07-25 | End: 2022-07-27 | Stop reason: HOSPADM

## 2022-07-25 RX ORDER — SODIUM CHLORIDE 0.9 % (FLUSH) 0.9 %
10 SYRINGE (ML) INJECTION EVERY 12 HOURS SCHEDULED
Status: DISCONTINUED | OUTPATIENT
Start: 2022-07-25 | End: 2022-07-27 | Stop reason: HOSPADM

## 2022-07-25 RX ORDER — BUDESONIDE AND FORMOTEROL FUMARATE DIHYDRATE 160; 4.5 UG/1; UG/1
2 AEROSOL RESPIRATORY (INHALATION)
Status: DISCONTINUED | OUTPATIENT
Start: 2022-07-25 | End: 2022-07-27 | Stop reason: HOSPADM

## 2022-07-25 RX ORDER — SODIUM CHLORIDE 0.9 % (FLUSH) 0.9 %
10 SYRINGE (ML) INJECTION AS NEEDED
Status: DISCONTINUED | OUTPATIENT
Start: 2022-07-25 | End: 2022-07-27 | Stop reason: HOSPADM

## 2022-07-25 RX ORDER — COLCHICINE 0.6 MG/1
0.6 TABLET ORAL 3 TIMES DAILY PRN
COMMUNITY

## 2022-07-25 RX ORDER — POTASSIUM CHLORIDE 750 MG/1
10 TABLET, FILM COATED, EXTENDED RELEASE ORAL DAILY
Status: DISCONTINUED | OUTPATIENT
Start: 2022-07-26 | End: 2022-07-27 | Stop reason: HOSPADM

## 2022-07-25 RX ORDER — ALLOPURINOL 300 MG/1
300 TABLET ORAL DAILY
Status: DISCONTINUED | OUTPATIENT
Start: 2022-07-25 | End: 2022-07-27 | Stop reason: HOSPADM

## 2022-07-25 RX ORDER — ALBUTEROL SULFATE 2.5 MG/3ML
2.5 SOLUTION RESPIRATORY (INHALATION) EVERY 4 HOURS PRN
Status: DISCONTINUED | OUTPATIENT
Start: 2022-07-25 | End: 2022-07-27 | Stop reason: HOSPADM

## 2022-07-25 RX ORDER — METOPROLOL SUCCINATE 25 MG/1
25 TABLET, EXTENDED RELEASE ORAL DAILY
Status: ON HOLD | COMMUNITY
End: 2022-07-25

## 2022-07-25 RX ORDER — TIZANIDINE 4 MG/1
4 TABLET ORAL 2 TIMES DAILY PRN
Status: DISCONTINUED | OUTPATIENT
Start: 2022-07-25 | End: 2022-07-27 | Stop reason: HOSPADM

## 2022-07-25 RX ORDER — LISINOPRIL 20 MG/1
20 TABLET ORAL 2 TIMES DAILY
Status: ON HOLD | COMMUNITY
End: 2022-07-25

## 2022-07-25 RX ORDER — LEVETIRACETAM 500 MG/1
1500 TABLET ORAL EVERY 12 HOURS SCHEDULED
Status: DISCONTINUED | OUTPATIENT
Start: 2022-07-25 | End: 2022-07-27 | Stop reason: HOSPADM

## 2022-07-25 RX ORDER — NITROGLYCERIN 400 UG/1
1 SPRAY ORAL
COMMUNITY
End: 2023-02-17

## 2022-07-25 RX ORDER — MULTIPLE VITAMINS W/ MINERALS TAB 9MG-400MCG
1 TAB ORAL DAILY
Status: DISCONTINUED | OUTPATIENT
Start: 2022-07-26 | End: 2022-07-27 | Stop reason: HOSPADM

## 2022-07-25 RX ORDER — ISOSORBIDE MONONITRATE 60 MG/1
60 TABLET, EXTENDED RELEASE ORAL EVERY MORNING
Status: DISCONTINUED | OUTPATIENT
Start: 2022-07-26 | End: 2022-07-27 | Stop reason: HOSPADM

## 2022-07-25 RX ORDER — METOPROLOL SUCCINATE 50 MG/1
100 TABLET, EXTENDED RELEASE ORAL DAILY
Status: DISCONTINUED | OUTPATIENT
Start: 2022-07-25 | End: 2022-07-27 | Stop reason: HOSPADM

## 2022-07-25 RX ORDER — PANTOPRAZOLE SODIUM 40 MG/1
40 TABLET, DELAYED RELEASE ORAL EVERY MORNING
Status: DISCONTINUED | OUTPATIENT
Start: 2022-07-26 | End: 2022-07-27 | Stop reason: HOSPADM

## 2022-07-25 RX ORDER — ROSUVASTATIN CALCIUM 10 MG/1
10 TABLET, COATED ORAL NIGHTLY
Status: DISCONTINUED | OUTPATIENT
Start: 2022-07-25 | End: 2022-07-27 | Stop reason: HOSPADM

## 2022-07-25 RX ORDER — SPIRONOLACTONE 25 MG/1
25 TABLET ORAL DAILY
Status: ON HOLD | COMMUNITY
End: 2022-07-25

## 2022-07-25 RX ORDER — NITROGLYCERIN 0.4 MG/1
0.4 TABLET SUBLINGUAL
Status: DISCONTINUED | OUTPATIENT
Start: 2022-07-25 | End: 2022-07-27 | Stop reason: HOSPADM

## 2022-07-25 RX ORDER — POTASSIUM CHLORIDE 7.45 MG/ML
10 INJECTION INTRAVENOUS
Status: DISCONTINUED | OUTPATIENT
Start: 2022-07-25 | End: 2022-07-27 | Stop reason: HOSPADM

## 2022-07-25 RX ORDER — ASPIRIN 81 MG/1
81 TABLET ORAL DAILY
Status: DISCONTINUED | OUTPATIENT
Start: 2022-07-25 | End: 2022-07-27 | Stop reason: HOSPADM

## 2022-07-25 RX ORDER — BUDESONIDE AND FORMOTEROL FUMARATE DIHYDRATE 80; 4.5 UG/1; UG/1
2 AEROSOL RESPIRATORY (INHALATION)
Status: ON HOLD | COMMUNITY
End: 2022-07-25

## 2022-07-25 RX ORDER — BUPRENORPHINE HYDROCHLORIDE AND NALOXONE HYDROCHLORIDE DIHYDRATE 8; 2 MG/1; MG/1
2.25 TABLET SUBLINGUAL DAILY
Status: DISCONTINUED | OUTPATIENT
Start: 2022-07-26 | End: 2022-07-27 | Stop reason: HOSPADM

## 2022-07-25 RX ORDER — TAMSULOSIN HYDROCHLORIDE 0.4 MG/1
0.4 CAPSULE ORAL DAILY
Status: DISCONTINUED | OUTPATIENT
Start: 2022-07-25 | End: 2022-07-27 | Stop reason: HOSPADM

## 2022-07-25 RX ORDER — AZELASTINE 1 MG/ML
1 SPRAY, METERED NASAL 2 TIMES DAILY
Status: DISCONTINUED | OUTPATIENT
Start: 2022-07-25 | End: 2022-07-25 | Stop reason: RX

## 2022-07-25 RX ORDER — COLCHICINE 0.6 MG/1
0.6 TABLET ORAL 3 TIMES DAILY PRN
Status: DISCONTINUED | OUTPATIENT
Start: 2022-07-25 | End: 2022-07-27 | Stop reason: HOSPADM

## 2022-07-25 RX ORDER — MELOXICAM 7.5 MG/1
15 TABLET ORAL DAILY
Status: DISCONTINUED | OUTPATIENT
Start: 2022-07-26 | End: 2022-07-27 | Stop reason: HOSPADM

## 2022-07-25 RX ORDER — FLUOXETINE HYDROCHLORIDE 20 MG/1
20 CAPSULE ORAL DAILY
COMMUNITY

## 2022-07-25 RX ADMIN — ACETAMINOPHEN 500 MG: 500 TABLET ORAL at 23:48

## 2022-07-25 RX ADMIN — LEVETIRACETAM 1500 MG: 500 TABLET, FILM COATED ORAL at 21:24

## 2022-07-25 RX ADMIN — BUDESONIDE AND FORMOTEROL FUMARATE DIHYDRATE 2 PUFF: 160; 4.5 AEROSOL RESPIRATORY (INHALATION) at 22:30

## 2022-07-25 RX ADMIN — Medication 10 ML: at 21:25

## 2022-07-25 RX ADMIN — SILVER SULFADIAZINE 1 APPLICATION: 10 CREAM TOPICAL at 21:24

## 2022-07-25 RX ADMIN — NYSTATIN: 100000 POWDER TOPICAL at 21:24

## 2022-07-25 RX ADMIN — RIVAROXABAN 20 MG: 20 TABLET, FILM COATED ORAL at 21:24

## 2022-07-25 RX ADMIN — ASPIRIN 81 MG: 81 TABLET, COATED ORAL at 21:24

## 2022-07-25 RX ADMIN — TAMSULOSIN HYDROCHLORIDE 0.4 MG: 0.4 CAPSULE ORAL at 21:36

## 2022-07-25 RX ADMIN — MAGNESIUM SULFATE HEPTAHYDRATE 4 G: 40 INJECTION, SOLUTION INTRAVENOUS at 16:00

## 2022-07-25 RX ADMIN — METOPROLOL SUCCINATE 100 MG: 50 TABLET, FILM COATED, EXTENDED RELEASE ORAL at 21:35

## 2022-07-25 RX ADMIN — ROSUVASTATIN CALCIUM 10 MG: 10 TABLET, FILM COATED ORAL at 21:24

## 2022-07-25 RX ADMIN — Medication 10 ML: at 12:54

## 2022-07-26 ENCOUNTER — APPOINTMENT (OUTPATIENT)
Dept: CARDIOLOGY | Facility: HOSPITAL | Age: 52
End: 2022-07-26

## 2022-07-26 ENCOUNTER — ANESTHESIA EVENT (OUTPATIENT)
Dept: CARDIOLOGY | Facility: HOSPITAL | Age: 52
End: 2022-07-26

## 2022-07-26 ENCOUNTER — ANESTHESIA (OUTPATIENT)
Dept: CARDIOLOGY | Facility: HOSPITAL | Age: 52
End: 2022-07-26

## 2022-07-26 LAB
ANION GAP SERPL CALCULATED.3IONS-SCNC: 10 MMOL/L (ref 5–15)
BASOPHILS # BLD AUTO: 0.02 10*3/MM3 (ref 0–0.2)
BASOPHILS NFR BLD AUTO: 0.5 % (ref 0–1.5)
BUN SERPL-MCNC: 15 MG/DL (ref 6–20)
BUN/CREAT SERPL: 20.3 (ref 7–25)
CALCIUM SPEC-SCNC: 9.1 MG/DL (ref 8.6–10.5)
CHLORIDE SERPL-SCNC: 104 MMOL/L (ref 98–107)
CO2 SERPL-SCNC: 24 MMOL/L (ref 22–29)
CREAT SERPL-MCNC: 0.74 MG/DL (ref 0.76–1.27)
DEPRECATED RDW RBC AUTO: 64.1 FL (ref 37–54)
EGFRCR SERPLBLD CKD-EPI 2021: 109 ML/MIN/1.73
EOSINOPHIL # BLD AUTO: 0.26 10*3/MM3 (ref 0–0.4)
EOSINOPHIL NFR BLD AUTO: 6.6 % (ref 0.3–6.2)
ERYTHROCYTE [DISTWIDTH] IN BLOOD BY AUTOMATED COUNT: 18.9 % (ref 12.3–15.4)
GLUCOSE SERPL-MCNC: 107 MG/DL (ref 65–99)
HCT VFR BLD AUTO: 38 % (ref 37.5–51)
HGB BLD-MCNC: 12.2 G/DL (ref 13–17.7)
IMM GRANULOCYTES # BLD AUTO: 0.01 10*3/MM3 (ref 0–0.05)
IMM GRANULOCYTES NFR BLD AUTO: 0.3 % (ref 0–0.5)
LYMPHOCYTES # BLD AUTO: 0.82 10*3/MM3 (ref 0.7–3.1)
LYMPHOCYTES NFR BLD AUTO: 21 % (ref 19.6–45.3)
MAGNESIUM SERPL-MCNC: 2 MG/DL (ref 1.6–2.6)
MAXIMAL PREDICTED HEART RATE: 168 BPM
MCH RBC QN AUTO: 29.9 PG (ref 26.6–33)
MCHC RBC AUTO-ENTMCNC: 32.1 G/DL (ref 31.5–35.7)
MCV RBC AUTO: 93.1 FL (ref 79–97)
MONOCYTES # BLD AUTO: 0.42 10*3/MM3 (ref 0.1–0.9)
MONOCYTES NFR BLD AUTO: 10.7 % (ref 5–12)
NEUTROPHILS NFR BLD AUTO: 2.38 10*3/MM3 (ref 1.7–7)
NEUTROPHILS NFR BLD AUTO: 60.9 % (ref 42.7–76)
NRBC BLD AUTO-RTO: 0 /100 WBC (ref 0–0.2)
PLATELET # BLD AUTO: 157 10*3/MM3 (ref 140–450)
PMV BLD AUTO: 11.2 FL (ref 6–12)
POTASSIUM SERPL-SCNC: 4.4 MMOL/L (ref 3.5–5.2)
RBC # BLD AUTO: 4.08 10*6/MM3 (ref 4.14–5.8)
SODIUM SERPL-SCNC: 138 MMOL/L (ref 136–145)
STRESS TARGET HR: 143 BPM
WBC NRBC COR # BLD: 3.91 10*3/MM3 (ref 3.4–10.8)

## 2022-07-26 PROCEDURE — 93010 ELECTROCARDIOGRAM REPORT: CPT | Performed by: INTERNAL MEDICINE

## 2022-07-26 PROCEDURE — 94799 UNLISTED PULMONARY SVC/PX: CPT

## 2022-07-26 PROCEDURE — 83735 ASSAY OF MAGNESIUM: CPT | Performed by: NURSE PRACTITIONER

## 2022-07-26 PROCEDURE — 92960 CARDIOVERSION ELECTRIC EXT: CPT | Performed by: INTERNAL MEDICINE

## 2022-07-26 PROCEDURE — 25010000002 PROPOFOL 10 MG/ML EMULSION: Performed by: NURSE ANESTHETIST, CERTIFIED REGISTERED

## 2022-07-26 PROCEDURE — 5A2204Z RESTORATION OF CARDIAC RHYTHM, SINGLE: ICD-10-PCS | Performed by: INTERNAL MEDICINE

## 2022-07-26 PROCEDURE — 93005 ELECTROCARDIOGRAM TRACING: CPT | Performed by: INTERNAL MEDICINE

## 2022-07-26 PROCEDURE — 92960 CARDIOVERSION ELECTRIC EXT: CPT

## 2022-07-26 PROCEDURE — 99232 SBSQ HOSP IP/OBS MODERATE 35: CPT | Performed by: INTERNAL MEDICINE

## 2022-07-26 PROCEDURE — 85025 COMPLETE CBC W/AUTO DIFF WBC: CPT | Performed by: NURSE PRACTITIONER

## 2022-07-26 PROCEDURE — 80048 BASIC METABOLIC PNL TOTAL CA: CPT | Performed by: NURSE PRACTITIONER

## 2022-07-26 PROCEDURE — 94761 N-INVAS EAR/PLS OXIMETRY MLT: CPT

## 2022-07-26 RX ORDER — PROPOFOL 10 MG/ML
VIAL (ML) INTRAVENOUS AS NEEDED
Status: DISCONTINUED | OUTPATIENT
Start: 2022-07-26 | End: 2022-07-26 | Stop reason: SURG

## 2022-07-26 RX ORDER — SODIUM CHLORIDE, SODIUM LACTATE, POTASSIUM CHLORIDE, CALCIUM CHLORIDE 600; 310; 30; 20 MG/100ML; MG/100ML; MG/100ML; MG/100ML
INJECTION, SOLUTION INTRAVENOUS CONTINUOUS PRN
Status: DISCONTINUED | OUTPATIENT
Start: 2022-07-26 | End: 2022-07-26 | Stop reason: SURG

## 2022-07-26 RX ADMIN — POTASSIUM CHLORIDE 10 MEQ: 750 TABLET, FILM COATED, EXTENDED RELEASE ORAL at 10:47

## 2022-07-26 RX ADMIN — RIVAROXABAN 20 MG: 20 TABLET, FILM COATED ORAL at 21:10

## 2022-07-26 RX ADMIN — METOPROLOL SUCCINATE 100 MG: 50 TABLET, FILM COATED, EXTENDED RELEASE ORAL at 10:46

## 2022-07-26 RX ADMIN — NYSTATIN: 100000 POWDER TOPICAL at 21:11

## 2022-07-26 RX ADMIN — ALLOPURINOL 300 MG: 300 TABLET ORAL at 10:47

## 2022-07-26 RX ADMIN — LEVETIRACETAM 1500 MG: 500 TABLET, FILM COATED ORAL at 10:45

## 2022-07-26 RX ADMIN — FLUOXETINE HYDROCHLORIDE 20 MG: 20 CAPSULE ORAL at 10:46

## 2022-07-26 RX ADMIN — BUDESONIDE AND FORMOTEROL FUMARATE DIHYDRATE 2 PUFF: 160; 4.5 AEROSOL RESPIRATORY (INHALATION) at 18:46

## 2022-07-26 RX ADMIN — Medication 10 ML: at 21:12

## 2022-07-26 RX ADMIN — LEVETIRACETAM 1500 MG: 500 TABLET, FILM COATED ORAL at 21:10

## 2022-07-26 RX ADMIN — ASPIRIN 81 MG: 81 TABLET, COATED ORAL at 10:48

## 2022-07-26 RX ADMIN — TAMSULOSIN HYDROCHLORIDE 0.4 MG: 0.4 CAPSULE ORAL at 10:47

## 2022-07-26 RX ADMIN — SILVER SULFADIAZINE 1 APPLICATION: 10 CREAM TOPICAL at 10:50

## 2022-07-26 RX ADMIN — MELOXICAM 15 MG: 7.5 TABLET ORAL at 10:46

## 2022-07-26 RX ADMIN — BUPRENORPHINE HYDROCHLORIDE AND NALOXONE HYDROCHLORIDE DIHYDRATE 2.25 TABLET: 8; 2 TABLET SUBLINGUAL at 10:45

## 2022-07-26 RX ADMIN — SILVER SULFADIAZINE 1 APPLICATION: 10 CREAM TOPICAL at 21:11

## 2022-07-26 RX ADMIN — ARIPIPRAZOLE 10 MG: 10 TABLET ORAL at 10:48

## 2022-07-26 RX ADMIN — NYSTATIN: 100000 POWDER TOPICAL at 10:48

## 2022-07-26 RX ADMIN — SODIUM CHLORIDE, POTASSIUM CHLORIDE, SODIUM LACTATE AND CALCIUM CHLORIDE: 600; 310; 30; 20 INJECTION, SOLUTION INTRAVENOUS at 11:22

## 2022-07-26 RX ADMIN — Medication 10 ML: at 10:48

## 2022-07-26 RX ADMIN — GABAPENTIN 400 MG: 400 CAPSULE ORAL at 10:45

## 2022-07-26 RX ADMIN — SACUBITRIL AND VALSARTAN 1 TABLET: 49; 51 TABLET, FILM COATED ORAL at 10:47

## 2022-07-26 RX ADMIN — BUDESONIDE AND FORMOTEROL FUMARATE DIHYDRATE 2 PUFF: 160; 4.5 AEROSOL RESPIRATORY (INHALATION) at 06:27

## 2022-07-26 RX ADMIN — GABAPENTIN 400 MG: 400 CAPSULE ORAL at 21:17

## 2022-07-26 RX ADMIN — PROPOFOL 50 MG: 10 INJECTION, EMULSION INTRAVENOUS at 11:26

## 2022-07-26 RX ADMIN — ROSUVASTATIN CALCIUM 10 MG: 10 TABLET, FILM COATED ORAL at 21:10

## 2022-07-26 RX ADMIN — PROPOFOL 50 MG: 10 INJECTION, EMULSION INTRAVENOUS at 11:32

## 2022-07-26 RX ADMIN — Medication 1 TABLET: at 10:46

## 2022-07-26 NOTE — ANESTHESIA POSTPROCEDURE EVALUATION
Patient: Adriano Henry    Procedure Summary     Date: 07/26/22 Room / Location: [facility]    Anesthesia Start: 1122 Anesthesia Stop: 1135    Procedure: CARDIOVERSION EXTERNAL IN CARDIOLOGY DEPARTMENT Diagnosis: (Atrial fibrillation)    Scheduled Providers: Shashank Mendes MD Provider: Loy Winn MD    Anesthesia Type: general ASA Status: 3          Anesthesia Type: general    Vitals  Vitals Value Taken Time   /62 07/26/22 1158   Temp     Pulse 60 07/26/22 1158   Resp 16 07/26/22 1158   SpO2 100 % 07/26/22 1158           Post Anesthesia Care and Evaluation    Patient location during evaluation: bedside  Patient participation: complete - patient participated  Level of consciousness: awake and alert  Pain score: 1  Pain management: adequate    Airway patency: patent  Anesthetic complications: No anesthetic complications  PONV Status: none  Cardiovascular status: acceptable  Respiratory status: acceptable  Hydration status: acceptable

## 2022-07-26 NOTE — ANESTHESIA PREPROCEDURE EVALUATION
Anesthesia Evaluation     Patient summary reviewed and Nursing notes reviewed   NPO Solid Status: > 8 hours  NPO Liquid Status: > 8 hours           Airway   Mallampati: I  Dental      Pulmonary    (+) COPD, decreased breath sounds,   Cardiovascular   Exercise tolerance: poor (<4 METS)    ECG reviewed  Rhythm: irregular  Rate: abnormal    (+) hypertension, dysrhythmias,       Neuro/Psych  (+) seizures,    GI/Hepatic/Renal/Endo      Musculoskeletal     Abdominal   (+) obese,    Substance History      OB/GYN          Other   arthritis,                      Anesthesia Plan    ASA 3     general   total IV anesthesia  intravenous induction     Anesthetic plan, risks, benefits, and alternatives have been provided, discussed and informed consent has been obtained with: patient.  Use of blood products discussed with patient .       CODE STATUS:    Level Of Support Discussed With: Patient  Code Status (Patient has no pulse and is not breathing): CPR (Attempt to Resuscitate)  Medical Interventions (Patient has pulse or is breathing): Full Support

## 2022-07-27 VITALS
TEMPERATURE: 98.1 F | HEART RATE: 75 BPM | DIASTOLIC BLOOD PRESSURE: 60 MMHG | HEIGHT: 70 IN | OXYGEN SATURATION: 96 % | SYSTOLIC BLOOD PRESSURE: 96 MMHG | RESPIRATION RATE: 18 BRPM | BODY MASS INDEX: 45.1 KG/M2 | WEIGHT: 315 LBS

## 2022-07-27 DIAGNOSIS — I48.19 ATRIAL FIBRILLATION, PERSISTENT: Primary | ICD-10-CM

## 2022-07-27 LAB
ANION GAP SERPL CALCULATED.3IONS-SCNC: 8 MMOL/L (ref 5–15)
BASOPHILS # BLD AUTO: 0.03 10*3/MM3 (ref 0–0.2)
BASOPHILS NFR BLD AUTO: 0.6 % (ref 0–1.5)
BUN SERPL-MCNC: 12 MG/DL (ref 6–20)
BUN/CREAT SERPL: 16.7 (ref 7–25)
CALCIUM SPEC-SCNC: 9.1 MG/DL (ref 8.6–10.5)
CHLORIDE SERPL-SCNC: 107 MMOL/L (ref 98–107)
CO2 SERPL-SCNC: 26 MMOL/L (ref 22–29)
CREAT SERPL-MCNC: 0.72 MG/DL (ref 0.76–1.27)
DEPRECATED RDW RBC AUTO: 66 FL (ref 37–54)
EGFRCR SERPLBLD CKD-EPI 2021: 109.9 ML/MIN/1.73
EOSINOPHIL # BLD AUTO: 0.35 10*3/MM3 (ref 0–0.4)
EOSINOPHIL NFR BLD AUTO: 7.4 % (ref 0.3–6.2)
ERYTHROCYTE [DISTWIDTH] IN BLOOD BY AUTOMATED COUNT: 19.1 % (ref 12.3–15.4)
GLUCOSE SERPL-MCNC: 94 MG/DL (ref 65–99)
HCT VFR BLD AUTO: 40.5 % (ref 37.5–51)
HGB BLD-MCNC: 12.6 G/DL (ref 13–17.7)
IMM GRANULOCYTES # BLD AUTO: 0.01 10*3/MM3 (ref 0–0.05)
IMM GRANULOCYTES NFR BLD AUTO: 0.2 % (ref 0–0.5)
LYMPHOCYTES # BLD AUTO: 0.96 10*3/MM3 (ref 0.7–3.1)
LYMPHOCYTES NFR BLD AUTO: 20.3 % (ref 19.6–45.3)
MAGNESIUM SERPL-MCNC: 1.8 MG/DL (ref 1.6–2.6)
MCH RBC QN AUTO: 29.3 PG (ref 26.6–33)
MCHC RBC AUTO-ENTMCNC: 31.1 G/DL (ref 31.5–35.7)
MCV RBC AUTO: 94.2 FL (ref 79–97)
MONOCYTES # BLD AUTO: 0.48 10*3/MM3 (ref 0.1–0.9)
MONOCYTES NFR BLD AUTO: 10.1 % (ref 5–12)
NEUTROPHILS NFR BLD AUTO: 2.91 10*3/MM3 (ref 1.7–7)
NEUTROPHILS NFR BLD AUTO: 61.4 % (ref 42.7–76)
NRBC BLD AUTO-RTO: 0 /100 WBC (ref 0–0.2)
PLATELET # BLD AUTO: 154 10*3/MM3 (ref 140–450)
PMV BLD AUTO: 10.8 FL (ref 6–12)
POTASSIUM SERPL-SCNC: 4.5 MMOL/L (ref 3.5–5.2)
QT INTERVAL: 480 MS
QT INTERVAL: 490 MS
QT INTERVAL: 496 MS
QTC INTERVAL: 540 MS
QTC INTERVAL: 543 MS
QTC INTERVAL: 561 MS
RBC # BLD AUTO: 4.3 10*6/MM3 (ref 4.14–5.8)
SODIUM SERPL-SCNC: 141 MMOL/L (ref 136–145)
WBC NRBC COR # BLD: 4.74 10*3/MM3 (ref 3.4–10.8)

## 2022-07-27 PROCEDURE — 99239 HOSP IP/OBS DSCHRG MGMT >30: CPT | Performed by: NURSE PRACTITIONER

## 2022-07-27 PROCEDURE — 94799 UNLISTED PULMONARY SVC/PX: CPT

## 2022-07-27 PROCEDURE — 80048 BASIC METABOLIC PNL TOTAL CA: CPT | Performed by: NURSE PRACTITIONER

## 2022-07-27 PROCEDURE — 83735 ASSAY OF MAGNESIUM: CPT | Performed by: NURSE PRACTITIONER

## 2022-07-27 PROCEDURE — 0 MAGNESIUM SULFATE 4 GM/100ML SOLUTION: Performed by: INTERNAL MEDICINE

## 2022-07-27 PROCEDURE — 85025 COMPLETE CBC W/AUTO DIFF WBC: CPT | Performed by: NURSE PRACTITIONER

## 2022-07-27 RX ORDER — MAGNESIUM SULFATE HEPTAHYDRATE 40 MG/ML
4 INJECTION, SOLUTION INTRAVENOUS ONCE
Status: COMPLETED | OUTPATIENT
Start: 2022-07-27 | End: 2022-07-27

## 2022-07-27 RX ADMIN — POTASSIUM CHLORIDE 10 MEQ: 750 TABLET, FILM COATED, EXTENDED RELEASE ORAL at 09:02

## 2022-07-27 RX ADMIN — ARIPIPRAZOLE 10 MG: 10 TABLET ORAL at 09:02

## 2022-07-27 RX ADMIN — ALLOPURINOL 300 MG: 300 TABLET ORAL at 09:02

## 2022-07-27 RX ADMIN — NYSTATIN: 100000 POWDER TOPICAL at 09:06

## 2022-07-27 RX ADMIN — BUPRENORPHINE HYDROCHLORIDE AND NALOXONE HYDROCHLORIDE DIHYDRATE 2.25 TABLET: 8; 2 TABLET SUBLINGUAL at 09:01

## 2022-07-27 RX ADMIN — BUDESONIDE AND FORMOTEROL FUMARATE DIHYDRATE 2 PUFF: 160; 4.5 AEROSOL RESPIRATORY (INHALATION) at 06:27

## 2022-07-27 RX ADMIN — MELOXICAM 15 MG: 7.5 TABLET ORAL at 09:02

## 2022-07-27 RX ADMIN — FLUOXETINE HYDROCHLORIDE 20 MG: 20 CAPSULE ORAL at 09:02

## 2022-07-27 RX ADMIN — PANTOPRAZOLE SODIUM 40 MG: 40 TABLET, DELAYED RELEASE ORAL at 06:13

## 2022-07-27 RX ADMIN — TAMSULOSIN HYDROCHLORIDE 0.4 MG: 0.4 CAPSULE ORAL at 09:02

## 2022-07-27 RX ADMIN — ISOSORBIDE MONONITRATE 60 MG: 60 TABLET ORAL at 09:02

## 2022-07-27 RX ADMIN — ASPIRIN 81 MG: 81 TABLET, COATED ORAL at 09:02

## 2022-07-27 RX ADMIN — METOPROLOL SUCCINATE 100 MG: 50 TABLET, FILM COATED, EXTENDED RELEASE ORAL at 09:05

## 2022-07-27 RX ADMIN — MAGNESIUM SULFATE HEPTAHYDRATE 4 G: 40 INJECTION, SOLUTION INTRAVENOUS at 09:55

## 2022-07-27 RX ADMIN — Medication 10 ML: at 09:12

## 2022-07-27 RX ADMIN — SILVER SULFADIAZINE 1 APPLICATION: 10 CREAM TOPICAL at 09:06

## 2022-07-27 RX ADMIN — LEVETIRACETAM 1500 MG: 500 TABLET, FILM COATED ORAL at 09:01

## 2022-07-27 RX ADMIN — SACUBITRIL AND VALSARTAN 1 TABLET: 49; 51 TABLET, FILM COATED ORAL at 09:02

## 2022-07-27 RX ADMIN — Medication 1 TABLET: at 09:02

## 2022-07-27 RX ADMIN — ACETAMINOPHEN 500 MG: 500 TABLET ORAL at 06:14

## 2022-07-28 ENCOUNTER — READMISSION MANAGEMENT (OUTPATIENT)
Dept: CALL CENTER | Facility: HOSPITAL | Age: 52
End: 2022-07-28

## 2022-07-28 NOTE — OUTREACH NOTE
Prep Survey    Flowsheet Row Responses   [facility] facility patient discharged from? [facility]   Is LACE score < 7 ? No   Emergency Room discharge w/ pulse ox? No   Eligibility Readm Mgmt   Discharge diagnosis Persistent atrial fibrillation   Does the patient have one of the following disease processes/diagnoses(primary or secondary)? Other   Does the patient have Home health ordered? Yes   What is the Home health agency?  [facility]   Is there a DME ordered? Yes   What DME was ordered? [facility]    Prep survey completed? Yes          VAUGNH CAREY - Registered Nurse

## 2022-08-01 ENCOUNTER — READMISSION MANAGEMENT (OUTPATIENT)
Dept: CALL CENTER | Facility: HOSPITAL | Age: 52
End: 2022-08-01

## 2022-08-01 NOTE — OUTREACH NOTE
Medical Week 1 Survey    Flowsheet Row Responses   [facility] patient discharged from? [facility]   Does the patient have one of the following disease processes/diagnoses(primary or secondary)? Other   Week 1 attempt successful? Yes   Call start time 1139   Call end time 1145   Discharge diagnosis Persistent atrial fibrillation   Person spoke with today (if not patient) and relationship Renetta, wife   Meds reviewed with patient/caregiver? Yes   Is the patient having any side effects they believe may be caused by any medication additions or changes? No   Does the patient have all medications ordered at discharge? N/A   Is the patient taking all medications as directed (includes completed medication regime)? Yes   Does the patient have a primary care provider?  Yes   Does the patient have an appointment with their PCP within 7 days of discharge? Yes   Has the patient kept scheduled appointments due by today? N/A   What is the Home health agency?  [facility]   Has home health visited the patient within 72 hours of discharge? Yes   What DME was ordered? [facility]    Has all DME been delivered? No   DME comments will be delivering today or tomorrow   Psychosocial issues? No   Did the patient receive a copy of their discharge instructions? Yes   Nursing interventions Reviewed instructions with patient   What is the patient's perception of their health status since discharge? Improving   Is the patient/caregiver able to teach back signs and symptoms related to disease process for when to call PCP? Yes   Is the patient/caregiver able to teach back signs and symptoms related to disease process for when to call 911? Yes   Is the patient/caregiver able to teach back the hierarchy of who to call/visit for symptoms/problems? PCP, Specialist, Home health nurse, Urgent Care, ED, 911 Yes   If the patient is a current smoker, are they able to teach back resources for cessation? --  [is down to 1 1/2 PPD, has reduced  gradually]   Additional teach back comments wife states pt having nausea when eating , advised to discuss with PCP at appt on 8/3/22   Week 1 call completed? Yes          DARRELL FONTANEZ - Registered Nurse

## 2022-08-08 ENCOUNTER — READMISSION MANAGEMENT (OUTPATIENT)
Dept: CALL CENTER | Facility: HOSPITAL | Age: 52
End: 2022-08-08

## 2022-08-08 NOTE — OUTREACH NOTE
Medical Week 2 Survey    Flowsheet Row Responses   [facility] [place] patient discharged from? [facility]   Does the patient have one of the following disease processes/diagnoses(primary or secondary)? Other   Week 2 attempt successful? Yes   Call start time 1528   Call end time 1611   Person spoke with today (if not patient) and relationship Renetta-wife.   Meds reviewed with patient/caregiver? Yes   Is the patient having any side effects they believe may be caused by any medication additions or changes? No   Does the patient have all medications ordered at discharge? N/A   Is the patient taking all medications as directed (includes completed medication regime)? Yes   Comments regarding appointments Cardiology appt 08/22/22.   Does the patient have a primary care provider?  Yes   Does the patient have an appointment with their PCP within 7 days of discharge? Yes   Has the patient kept scheduled appointments due by today? Yes   Has home health visited the patient within 72 hours of discharge? Yes   Has all DME been delivered? Yes   DME comments Using home O2 continuous. O2 sats at 92-94% on home O2.   Psychosocial issues? No   What is the patient's perception of their health status since discharge? Same   Is the patient/caregiver able to teach back signs and symptoms related to disease process for when to call PCP? Yes   Is the patient/caregiver able to teach back signs and symptoms related to disease process for when to call 911? Yes   Is the patient/caregiver able to teach back the hierarchy of who to call/visit for symptoms/problems? PCP, Specialist, Home health nurse, Urgent Care, ED, 911 Yes   Week 2 Call Completed? Yes   Wrap up additional comments Wife states patient is about the same. Denies any edema or chest pain. Denies any needs today.          CHASIDY SINCLAIR - Registered Nurse

## 2022-08-18 ENCOUNTER — READMISSION MANAGEMENT (OUTPATIENT)
Dept: CALL CENTER | Facility: HOSPITAL | Age: 52
End: 2022-08-18

## 2022-08-18 NOTE — OUTREACH NOTE
Medical Week 4 Survey    Flowsheet Row Responses   [facility]   Does the patient have one of the following disease processes/diagnoses(primary or secondary)? Other   Week 4 attempt successful? No          DARRELL S - Registered Nurse

## 2022-08-30 RX ORDER — METOPROLOL SUCCINATE 25 MG/1
TABLET, EXTENDED RELEASE ORAL
Qty: 30 TABLET | Refills: 3 | OUTPATIENT
Start: 2022-08-30

## 2022-09-07 ENCOUNTER — OFFICE VISIT (OUTPATIENT)
Dept: CARDIOLOGY | Facility: CLINIC | Age: 52
End: 2022-09-07

## 2022-09-07 VITALS
BODY MASS INDEX: 45.1 KG/M2 | DIASTOLIC BLOOD PRESSURE: 60 MMHG | HEART RATE: 96 BPM | WEIGHT: 315 LBS | OXYGEN SATURATION: 99 % | HEIGHT: 70 IN | SYSTOLIC BLOOD PRESSURE: 100 MMHG

## 2022-09-07 DIAGNOSIS — I48.19 ATRIAL FIBRILLATION, PERSISTENT: Primary | ICD-10-CM

## 2022-09-07 PROCEDURE — 99214 OFFICE O/P EST MOD 30 MIN: CPT | Performed by: PHYSICIAN ASSISTANT

## 2022-09-07 PROCEDURE — 93270 REMOTE 30 DAY ECG REV/REPORT: CPT | Performed by: INTERNAL MEDICINE

## 2022-09-07 RX ORDER — NICOTINE 21-14-7MG
1 KIT TRANSDERMAL DAILY
Qty: 28 EACH | Refills: 2 | Status: SHIPPED | OUTPATIENT
Start: 2022-09-07 | End: 2022-09-16

## 2022-09-07 NOTE — PROGRESS NOTES
Fernando Whitaker APRN  Adriano Henry  1970 09/07/2022    Patient Active Problem List   Diagnosis   • Cellulitis and abscess of lower extremity   • Hematoma   • Paroxysmal atrial fibrillation (HCC)   • Heart failure with reduced ejection fraction (HCC)   • Atrial fibrillation, persistent (HCC)       Dear Fernando Whitaker APRN:    Subjective     History of Present Illness:    Chief Complaint   Patient presents with   • Follow-up       Adriano Henry is a pleasant 51 y.o. male with a past medical history significant for chronic HFrEF with LVEF of 40 to 45%, persistent atrial fibrillation, essential hypertension, sleep apnea, morbid obesity.  He also smokes tobacco.  He comes in today for routine cardiology follow up.     Adriano comes in after being hospitalized to try to convert patient into normal sinus rhythm.  Initially he was tried on Tikosyn however due to prolonged QTC was unable to tolerate this.  Subsequently cardioverted was attempted unfortunately was unsuccessful.  Throughout his stay he was noted to have small frequent sinus pauses of 2 to 2.8 seconds all occurring during sleeping hours.  Clinically today he still reports feeling his heartbeat and irregular rhythm and beating rapid at times.  He reports that shortness of breath has been stable and denies any recent worsening of this.  Does report that he recently acquired his CPAP machine and is going to try to wear this regularly.    No Known Allergies:      Current Outpatient Medications:   •  albuterol (PROVENTIL HFA;VENTOLIN HFA) 108 (90 Base) MCG/ACT inhaler, Inhale 2 puffs Every 4 (Four) Hours As Needed for Shortness of Air., Disp: , Rfl:   •  allopurinol (ZYLOPRIM) 300 MG tablet, Take 300 mg by mouth Daily., Disp: , Rfl:   •  ARIPiprazole (ABILIFY) 10 MG tablet, Take 10 mg by mouth Daily., Disp: , Rfl:   •  aspirin (aspirin) 81 MG EC tablet, Take 1 tablet by mouth Daily., Disp: 30 tablet, Rfl: 5  •  azelastine (ASTELIN) 0.1 % nasal spray,  1 spray into the nostril(s) as directed by provider 2 (Two) Times a Day. Use in each nostril as directed, Disp: , Rfl:   •  budesonide-formoterol (SYMBICORT) 160-4.5 MCG/ACT inhaler, Inhale 2 puffs 2 (Two) Times a Day., Disp: , Rfl:   •  buprenorphine-naloxone (SUBOXONE) 8-2 MG per SL tablet, Place 2.25 tablets under the tongue Daily., Disp: , Rfl:   •  colchicine 0.6 MG tablet, Take 0.6 mg by mouth 3 (Three) Times a Day As Needed (gout flare)., Disp: , Rfl:   •  FLUoxetine (PROzac) 20 MG capsule, Take 20 mg by mouth Daily., Disp: , Rfl:   •  furosemide (LASIX) 40 MG tablet, Take 40 mg by mouth As Needed., Disp: , Rfl:   •  gabapentin (NEURONTIN) 400 MG capsule, Take 400 mg by mouth 3 (Three) Times a Day., Disp: , Rfl:   •  isosorbide mononitrate (IMDUR) 60 MG 24 hr tablet, Take 1 tablet by mouth Every Morning., Disp: 30 tablet, Rfl: 3  •  levETIRAcetam (KEPPRA) 750 MG tablet, Take 1,500 mg by mouth 2 (Two) Times a Day., Disp: , Rfl:   •  meloxicam (MOBIC) 15 MG tablet, Take 15 mg by mouth Daily., Disp: , Rfl:   •  metoprolol succinate XL (TOPROL-XL) 100 MG 24 hr tablet, Take 100 mg by mouth Daily., Disp: , Rfl:   •  multivitamin with minerals tablet tablet, Take 1 tablet by mouth Daily., Disp: , Rfl:   •  niacin (SLO-NIACIN) 500 MG CR tablet, Take 500 mg by mouth Daily., Disp: , Rfl:   •  nitroglycerin (NITROLINGUAL) 0.4 MG/SPRAY spray, Place 1 spray under the tongue Every 5 (Five) Minutes As Needed for Chest Pain., Disp: , Rfl:   •  omeprazole (priLOSEC) 20 MG capsule, Take 20 mg by mouth Daily., Disp: , Rfl:   •  rivaroxaban (XARELTO) 20 MG tablet, Take 1 tablet by mouth every night at bedtime. AFIB, Disp: 30 tablet, Rfl: 5  •  rosuvastatin (CRESTOR) 10 MG tablet, Take 10 mg by mouth Every Night., Disp: , Rfl:   •  sacubitril-valsartan (Entresto) 49-51 MG tablet, Take 1 tablet by mouth 2 (Two) Times a Day., Disp: 60 tablet, Rfl: 3  •  silver sulfadiazine (SILVADENE, SSD) 1 % cream, Apply 1 application topically  "to the appropriate area as directed 2 (Two) Times a Day. Applies Buttocks and legs, Disp: , Rfl:   •  tamsulosin (FLOMAX) 0.4 MG capsule 24 hr capsule, Take 1 capsule by mouth Daily., Disp: , Rfl:   •  Nicotine 21-14-7 MG/24HR kit, Place 1 patch on the skin as directed by provider Daily., Disp: 28 each, Rfl: 2  •  potassium chloride 10 MEQ CR tablet, Take 10 mEq by mouth Daily., Disp: , Rfl:     The following portions of the patient's history were reviewed and updated as appropriate: allergies, current medications, past family history, past medical history, past social history, past surgical history and problem list.    Social History     Tobacco Use   • Smoking status: Current Every Day Smoker     Packs/day: 0.50     Years: 10.00     Pack years: 5.00     Types: Cigarettes   Vaping Use   • Vaping Use: Unknown   Substance Use Topics   • Alcohol use: Not Currently   • Drug use: Not Currently         Objective   Vitals:    09/07/22 1442 09/07/22 1501   BP: 100/60    Pulse: 96    SpO2: 99%    Weight: (!) 151 kg (333 lb 4.8 oz) (!) 199 kg (438 lb)   Height: 177.8 cm (70\")      Body mass index is 62.85 kg/m².    Constitutional:       General: Not in acute distress.     Appearance: Healthy appearance. Well-developed and not in distress. Not diaphoretic.      Comments: Morbid obseity, wheelchair-bound wearing oxygen via nasal cannula   Eyes:      Conjunctiva/sclera: Conjunctivae normal.      Pupils: Pupils are equal, round, and reactive to light.   HENT:      Head: Normocephalic and atraumatic.   Neck:      Vascular: No carotid bruit or JVD.   Pulmonary:      Effort: Pulmonary effort is normal. No respiratory distress.      Breath sounds: Normal breath sounds.      Comments: Wearing oxygen via nasal cannula  Cardiovascular:      Normal rate. Irregularly irregular rhythm.      Comments: Slight erythema on right lower extremity patient reports is chronic  Edema:     Pretibial: bilateral 2+ edema of the pretibial area.     " Ankle: bilateral 2+ edema of the ankle.     Feet: bilateral 2+ edema of the feet.  Skin:     General: Skin is cool.   Neurological:      Mental Status: Alert, oriented to person, place, and time and oriented to person, place and time.         Lab Results   Component Value Date     07/27/2022    K 4.5 07/27/2022     07/27/2022    CO2 26.0 07/27/2022    BUN 12 07/27/2022    CREATININE 0.72 (L) 07/27/2022    GLUCOSE 94 07/27/2022    CALCIUM 9.1 07/27/2022    AST 88 (H) 06/15/2021    ALT 48 (H) 06/15/2021    ALKPHOS 118 (H) 06/15/2021     Lab Results   Component Value Date    CKTOTAL 61 06/15/2021     Lab Results   Component Value Date    WBC 4.74 07/27/2022    HGB 12.6 (L) 07/27/2022    HCT 40.5 07/27/2022     07/27/2022     Lab Results   Component Value Date    INR 1.13 (H) 06/17/2021    INR 1.63 (H) 08/15/2017     Lab Results   Component Value Date    MG 1.8 07/27/2022     Lab Results   Component Value Date    TSH 0.559 06/15/2021      No results found for: BNP    During this visit the following were done:  Labs Reviewed []    Labs Ordered []    Radiology Reports Reviewed []    Radiology Ordered []    PCP Records Reviewed []    Referring Provider Records Reviewed []    ER Records Reviewed []    Hospital Records Reviewed []    History Obtained From Family []    Radiology Images Reviewed []    Other Reviewed []    Records Requested []       Procedures    Assessment & Plan    Diagnosis Plan   1. Atrial fibrillation, persistent (HCC)  Cardiac Event Monitor            Recommendations:  1. Persistent atrial fibrillation  1. Will order cardiac event monitor.   2. I did emphasis the importance of cpap compliance and that if he does not wear cpap and sleep apnea remains untreated maintaining sinus rhythm will be very difficult he expressed understanding and is motivated to use his cpap.   3. Will consider referral to EP once resulted.   2. Morbid obesity  1. I also had a long discussion with Adriano on his  need to continue to loose weight. Although he has lost roughly 50 lbs and did praise him for this. I did inform him to continue better lifestyle and contniued weight loss. I educated him that his weight is likely the cause of his sleep apnea. I also explained to him the increased risk his obesity has on his cardiovascular health, he expressed understanding with this.   3. Tobacco abuse  1. Discussed with him on the adverse tobacco has on his cardiovascular and pulmonary health, he understands this. He does seem motivated to want to quit. He did ask for assistance in the form of nicotine patches so I did offer this for him.   4. Questionable cellulitis on right lower extremity  1. He states is chronic and is going to wound care.       No follow-ups on file.    As always, I appreciate very much the opportunity to participate in the cardiovascular care of your patients.      With Best Regards,    Mark Durán PA-C

## 2022-09-08 ENCOUNTER — TELEPHONE (OUTPATIENT)
Dept: CARDIOLOGY | Facility: CLINIC | Age: 52
End: 2022-09-08

## 2022-09-08 NOTE — TELEPHONE ENCOUNTER
[facility] at a [facility] of 100 to 110bpm. Lane states they will fax over report to be read by doctor.

## 2022-09-09 DIAGNOSIS — I48.19 ATRIAL FIBRILLATION, PERSISTENT: ICD-10-CM

## 2022-09-16 ENCOUNTER — OFFICE VISIT (OUTPATIENT)
Dept: CARDIOLOGY | Facility: CLINIC | Age: 52
End: 2022-09-16

## 2022-09-16 VITALS
DIASTOLIC BLOOD PRESSURE: 57 MMHG | BODY MASS INDEX: 45.1 KG/M2 | HEART RATE: 87 BPM | WEIGHT: 315 LBS | HEIGHT: 70 IN | SYSTOLIC BLOOD PRESSURE: 90 MMHG

## 2022-09-16 DIAGNOSIS — I25.5 ISCHEMIC CARDIOMYOPATHY: ICD-10-CM

## 2022-09-16 DIAGNOSIS — I48.19 ATRIAL FIBRILLATION, PERSISTENT: Primary | ICD-10-CM

## 2022-09-16 DIAGNOSIS — Z72.0 TOBACCO ABUSE: ICD-10-CM

## 2022-09-16 PROCEDURE — 99214 OFFICE O/P EST MOD 30 MIN: CPT | Performed by: INTERNAL MEDICINE

## 2022-09-16 RX ORDER — HYDROXYZINE 50 MG/1
50 TABLET, FILM COATED ORAL AS NEEDED
COMMUNITY
Start: 2022-09-15

## 2022-09-16 RX ORDER — METOPROLOL SUCCINATE 50 MG/1
125 TABLET, EXTENDED RELEASE ORAL DAILY
Qty: 75 TABLET | Refills: 1 | Status: SHIPPED | OUTPATIENT
Start: 2022-09-16 | End: 2022-12-13

## 2022-09-16 RX ORDER — SACUBITRIL AND VALSARTAN 24; 26 MG/1; MG/1
1 TABLET, FILM COATED ORAL 2 TIMES DAILY
Qty: 60 TABLET | Refills: 3 | Status: SHIPPED | OUTPATIENT
Start: 2022-09-16

## 2022-09-16 RX ORDER — METOPROLOL SUCCINATE 50 MG/1
125 TABLET, EXTENDED RELEASE ORAL DAILY
Qty: 75 TABLET | Refills: 1 | Status: SHIPPED | COMMUNITY
Start: 2022-09-16 | End: 2022-09-16 | Stop reason: SDUPTHER

## 2022-09-16 NOTE — PROGRESS NOTES
Fernando Whitaker APRN  Adriano Henry  1970 09/16/2022    Patient Active Problem List   Diagnosis   • Cellulitis and abscess of lower extremity   • Hematoma   • Paroxysmal atrial fibrillation (HCC)   • Heart failure with reduced ejection fraction (HCC)   • Atrial fibrillation, persistent (HCC)       Dear Fernando Whitaker APRN:    Subjective     Adriano Henry is a 51 y.o. male with the problems as listed above, presents    Chief Complaint   Patient presents with   • Follow-up     ABN event reading.        History of Present Illness: Mr. Henry is a pleasant 49-year-old  male with history of persistent atrial fibrillation and mild cardiomyopathy with LV ejection fraction of about 40 to 45% with grade 2 diastolic noncompliance of the left ventricle along with moderate mitral regurgitation.  He is here to follow-up after his recent event monitor revealing episodes of atrial fibrillation with rapid ventriclar response.  He denies any palpitations or dizziness or syncope.  He just started to wear the CPAP and states he is sleeping better.  He is on Xarelto for stroke prevention with no bleeding issues.  He denies any chest pains or significant shortness of breath.            No Known Allergies:      Current Outpatient Medications:   •  albuterol (PROVENTIL HFA;VENTOLIN HFA) 108 (90 Base) MCG/ACT inhaler, Inhale 2 puffs Every 4 (Four) Hours As Needed for Shortness of Air., Disp: , Rfl:   •  allopurinol (ZYLOPRIM) 300 MG tablet, Take 300 mg by mouth Daily., Disp: , Rfl:   •  ARIPiprazole (ABILIFY) 10 MG tablet, Take 10 mg by mouth Daily., Disp: , Rfl:   •  aspirin (aspirin) 81 MG EC tablet, Take 1 tablet by mouth Daily., Disp: 30 tablet, Rfl: 5  •  azelastine (ASTELIN) 0.1 % nasal spray, 1 spray into the nostril(s) as directed by provider 2 (Two) Times a Day. Use in each nostril as directed, Disp: , Rfl:   •  budesonide-formoterol (SYMBICORT) 160-4.5 MCG/ACT inhaler, Inhale 2 puffs 2 (Two) Times a Day.,  Disp: , Rfl:   •  buprenorphine-naloxone (SUBOXONE) 8-2 MG per SL tablet, Place 2.25 tablets under the tongue Daily., Disp: , Rfl:   •  colchicine 0.6 MG tablet, Take 0.6 mg by mouth 3 (Three) Times a Day As Needed (gout flare)., Disp: , Rfl:   •  FLUoxetine (PROzac) 20 MG capsule, Take 20 mg by mouth Daily., Disp: , Rfl:   •  furosemide (LASIX) 40 MG tablet, Take 40 mg by mouth As Needed., Disp: , Rfl:   •  gabapentin (NEURONTIN) 400 MG capsule, Take 400 mg by mouth 3 (Three) Times a Day., Disp: , Rfl:   •  hydrOXYzine (ATARAX) 50 MG tablet, Take 50 mg by mouth 3 (Three) Times a Day., Disp: , Rfl:   •  isosorbide mononitrate (IMDUR) 60 MG 24 hr tablet, Take 1 tablet by mouth Every Morning., Disp: 30 tablet, Rfl: 3  •  levETIRAcetam (KEPPRA) 750 MG tablet, Take 1,500 mg by mouth 2 (Two) Times a Day., Disp: , Rfl:   •  meloxicam (MOBIC) 15 MG tablet, Take 15 mg by mouth Daily., Disp: , Rfl:   •  metoprolol succinate XL (TOPROL-XL) 50 MG 24 hr tablet, Take 2.5 tablets by mouth Daily., Disp: 75 tablet, Rfl: 1  •  multivitamin with minerals tablet tablet, Take 1 tablet by mouth Daily., Disp: , Rfl:   •  niacin (SLO-NIACIN) 500 MG CR tablet, Take 500 mg by mouth Daily., Disp: , Rfl:   •  nitroglycerin (NITROLINGUAL) 0.4 MG/SPRAY spray, Place 1 spray under the tongue Every 5 (Five) Minutes As Needed for Chest Pain., Disp: , Rfl:   •  omeprazole (priLOSEC) 20 MG capsule, Take 20 mg by mouth Daily., Disp: , Rfl:   •  potassium chloride 10 MEQ CR tablet, Take 10 mEq by mouth Daily., Disp: , Rfl:   •  rivaroxaban (XARELTO) 20 MG tablet, Take 1 tablet by mouth every night at bedtime. AFIB, Disp: 30 tablet, Rfl: 5  •  rosuvastatin (CRESTOR) 10 MG tablet, Take 10 mg by mouth Every Night., Disp: , Rfl:   •  silver sulfadiazine (SILVADENE, SSD) 1 % cream, Apply 1 application topically to the appropriate area as directed 2 (Two) Times a Day. Applies Buttocks and legs, Disp: , Rfl:   •  tamsulosin (FLOMAX) 0.4 MG capsule 24 hr  "capsule, Take 1 capsule by mouth Daily., Disp: , Rfl:   •  Nicotine 21-14-7 MG/24HR kit, Place 1 patch on the skin as directed by provider Daily., Disp: 28 each, Rfl: 2  •  sacubitril-valsartan (Entresto) 24-26 MG tablet, Take 1 tablet by mouth 2 (Two) Times a Day., Disp: 60 tablet, Rfl: 3      The following portions of the patient's history were reviewed and updated as appropriate: allergies, current medications, past family history, past medical history, past social history, past surgical history and problem list.    Social History     Tobacco Use   • Smoking status: Current Every Day Smoker     Packs/day: 0.50     Years: 10.00     Pack years: 5.00     Types: Cigarettes   Vaping Use   • Vaping Use: Unknown   Substance Use Topics   • Alcohol use: Not Currently   • Drug use: Not Currently       Review of Systems   Constitutional: Negative for chills and fever.   HENT: Negative for nosebleeds and sore throat.    Respiratory: Negative for cough, hemoptysis and wheezing.    Gastrointestinal: Negative for abdominal pain, hematemesis, hematochezia, melena, nausea and vomiting.   Genitourinary: Negative for dysuria and hematuria.   Neurological: Negative for headaches.       Objective   Vitals:    09/16/22 0908   BP: 90/57   Pulse: 87   Weight: (!) 199 kg (438 lb)   Height: 177.8 cm (70\")     Body mass index is 62.85 kg/m².    Constitutional:       Appearance: Well-developed.   Eyes:      Conjunctiva/sclera: Conjunctivae normal.   HENT:      Head: Normocephalic.   Neck:      Thyroid: No thyromegaly.      Vascular: No JVD.      Trachea: No tracheal deviation.   Pulmonary:      Effort: No respiratory distress.      Breath sounds: Normal breath sounds. No wheezing. No rales.   Cardiovascular:      PMI at left midclavicular line. Normal rate. Irregularly irregular rhythm. Normal S1. Normal S2.      Murmurs: There is no murmur.      No gallop. No click. No rub.   Pulses:     Intact distal pulses.   Edema:     Pretibial: " bilateral 1+ edema of the pretibial area.     Ankle: bilateral 1+ edema of the ankle.     Feet: bilateral 1+ edema of the feet.  Abdominal:      General: Bowel sounds are normal.      Palpations: Abdomen is soft. There is no abdominal mass.      Tenderness: There is no abdominal tenderness.   Musculoskeletal:      Cervical back: Normal range of motion and neck supple. Skin:     General: Skin is warm and dry.   Neurological:      Mental Status: Alert and oriented to person, place, and time.      Cranial Nerves: No cranial nerve deficit.       Lab Results   Component Value Date     07/27/2022    K 4.5 07/27/2022     07/27/2022    CO2 26.0 07/27/2022    BUN 12 07/27/2022    CREATININE 0.72 (L) 07/27/2022    GLUCOSE 94 07/27/2022    CALCIUM 9.1 07/27/2022    AST 88 (H) 06/15/2021    ALT 48 (H) 06/15/2021    ALKPHOS 118 (H) 06/15/2021     Lab Results   Component Value Date    CKTOTAL 61 06/15/2021     Lab Results   Component Value Date    WBC 4.74 07/27/2022    HGB 12.6 (L) 07/27/2022    HCT 40.5 07/27/2022     07/27/2022     Lab Results   Component Value Date    INR 1.13 (H) 06/17/2021    INR 1.63 (H) 08/15/2017     Lab Results   Component Value Date    MG 1.8 07/27/2022     Lab Results   Component Value Date    TSH 0.559 06/15/2021        Assessment & Plan :   Diagnosis Plan   1. Atrial fibrillation, persistent with intermittent rapid ventricular response.    2. Ischemic cardiomyopathy with LV ejection fraction of 41 to 45%, appears compensated.     3. Tobacco abuse            Recommendations:  1. For his intermittent tachycardia, will increase the metoprolol succinate dose to 125 mg daily.  2. Since patient states that his blood pressure runs low at home as well, will decrease the dose of Entresto to a smaller dose to avoid hypotension.  3. Continue event monitoring.  4. Will refer him to electrophysiologist for consideration of RF ablation if feasible as patient is not a good candidate for any of  the antiarrhythmic therapy due to baseline prolonged QTC and underlying cardiomyopathy.  I have discussed this plan with Mr. Henry and his wife (via FaceTime).    Return in about 6 weeks (around 10/28/2022).    As always, Fernando Whitaker APRN  I appreciate very much the opportunity to participate in the cardiovascular care of your patients. Please do not hesitate to call me with any questions with regards to Adriano Henry's evaluation and management.       With Best Regards,        Shashank Mendes MD, FACC    Please note that portions of this note were completed with a voice recognition program.

## 2022-10-10 ENCOUNTER — TREATMENT (OUTPATIENT)
Dept: CARDIOLOGY | Facility: CLINIC | Age: 52
End: 2022-10-10

## 2022-10-10 DIAGNOSIS — I48.0 PAROXYSMAL ATRIAL FIBRILLATION: Primary | ICD-10-CM

## 2022-10-13 PROCEDURE — 93272 ECG/REVIEW INTERPRET ONLY: CPT | Performed by: INTERNAL MEDICINE

## 2022-10-14 ENCOUNTER — TELEPHONE (OUTPATIENT)
Dept: CARDIOLOGY | Facility: CLINIC | Age: 52
End: 2022-10-14

## 2022-10-14 NOTE — TELEPHONE ENCOUNTER
Called pt and spoke with his wife and advised her that  wanted him to come in and be seen on Monday at 12:30 pm to go over his event EOS report. She stated that he would be here.        She also stated that Adriano's BP has been running low. She is going to call his HH nurse and see what it was the other day and call us back and let us know.

## 2022-10-28 ENCOUNTER — OFFICE VISIT (OUTPATIENT)
Dept: CARDIOLOGY | Facility: CLINIC | Age: 52
End: 2022-10-28

## 2022-10-28 VITALS
SYSTOLIC BLOOD PRESSURE: 133 MMHG | DIASTOLIC BLOOD PRESSURE: 83 MMHG | OXYGEN SATURATION: 97 % | HEART RATE: 76 BPM | WEIGHT: 315 LBS | BODY MASS INDEX: 45.1 KG/M2 | HEIGHT: 70 IN

## 2022-10-28 DIAGNOSIS — I48.0 PAROXYSMAL ATRIAL FIBRILLATION: Primary | ICD-10-CM

## 2022-10-28 PROCEDURE — 99215 OFFICE O/P EST HI 40 MIN: CPT | Performed by: PHYSICIAN ASSISTANT

## 2022-10-28 NOTE — PROGRESS NOTES
Fernando Whitaker APRN  Adriano Henry  1970  10/28/2022    Patient Active Problem List   Diagnosis   • Cellulitis and abscess of lower extremity   • Hematoma   • Paroxysmal atrial fibrillation (HCC)   • Heart failure with reduced ejection fraction (HCC)   • Atrial fibrillation, persistent (HCC)       Dear Fernando Whitaker APRN:    Subjective     History of Present Illness:    Chief Complaint   Patient presents with   • Med Management     Verbal.    • Results     Event EOS report.    • Shortness of Breath   • Edema   • Palpitations       Adriano Henry is a pleasant 51 y.o. male with a past medical history significant for mild cardiomyopathy with LVEF of 40 to 45%, atrial fibrillation, diastolic dysfunction.  Patient also has severe morbid obesity with BMI of 63 as well as sleep apnea with noncompliance with his CPAP.  He comes in today for cardiology follow-up.    Adriano did have cardiac monitor performed however he was somewhat noncompliant wearing this only 42% of the time totaling 11 days of monitoring period.  During this time.  He did have multiple entries of atrial fibrillation with RVR as well as 1 episode of a 4.0-second pause at 4:01 PM on 9/18/2022.  I did ask Adriano if he had any symptoms during this time and he was unable to recall.  However, he does deny any significant symptoms such as syncope or near syncope.  He also denies any worsening fatigue or weakness as well.  Although, he does admit he does not get around much and mostly sits at home and depends on his family members for most things.                No Known Allergies:      Current Outpatient Medications:   •  albuterol (PROVENTIL HFA;VENTOLIN HFA) 108 (90 Base) MCG/ACT inhaler, Inhale 2 puffs Every 4 (Four) Hours As Needed for Shortness of Air., Disp: , Rfl:   •  allopurinol (ZYLOPRIM) 300 MG tablet, Take 300 mg by mouth Daily., Disp: , Rfl:   •  ARIPiprazole (ABILIFY) 10 MG tablet, Take 10 mg by mouth Daily., Disp: , Rfl:   •   aspirin (aspirin) 81 MG EC tablet, Take 1 tablet by mouth Daily., Disp: 30 tablet, Rfl: 5  •  azelastine (ASTELIN) 0.1 % nasal spray, 1 spray into the nostril(s) as directed by provider 2 (Two) Times a Day. Use in each nostril as directed, Disp: , Rfl:   •  budesonide-formoterol (SYMBICORT) 160-4.5 MCG/ACT inhaler, Inhale 2 puffs 2 (Two) Times a Day., Disp: , Rfl:   •  buprenorphine-naloxone (SUBOXONE) 8-2 MG per SL tablet, Place 2.25 tablets under the tongue Daily., Disp: , Rfl:   •  colchicine 0.6 MG tablet, Take 0.6 mg by mouth 3 (Three) Times a Day As Needed (gout flare)., Disp: , Rfl:   •  FLUoxetine (PROzac) 20 MG capsule, Take 20 mg by mouth Daily., Disp: , Rfl:   •  furosemide (LASIX) 40 MG tablet, Take 40 mg by mouth As Needed., Disp: , Rfl:   •  gabapentin (NEURONTIN) 400 MG capsule, Take 400 mg by mouth 3 (Three) Times a Day., Disp: , Rfl:   •  hydrOXYzine (ATARAX) 50 MG tablet, Take 50 mg by mouth 3 (Three) Times a Day., Disp: , Rfl:   •  isosorbide mononitrate (IMDUR) 60 MG 24 hr tablet, Take 1 tablet by mouth Every Morning., Disp: 30 tablet, Rfl: 3  •  levETIRAcetam (KEPPRA) 750 MG tablet, Take 1,500 mg by mouth 2 (Two) Times a Day., Disp: , Rfl:   •  meloxicam (MOBIC) 15 MG tablet, Take 15 mg by mouth Daily., Disp: , Rfl:   •  metoprolol succinate XL (TOPROL-XL) 50 MG 24 hr tablet, Take 2.5 tablets by mouth Daily., Disp: 75 tablet, Rfl: 1  •  multivitamin with minerals tablet tablet, Take 1 tablet by mouth Daily., Disp: , Rfl:   •  niacin (SLO-NIACIN) 500 MG CR tablet, Take 500 mg by mouth Daily., Disp: , Rfl:   •  nitroglycerin (NITROLINGUAL) 0.4 MG/SPRAY spray, Place 1 spray under the tongue Every 5 (Five) Minutes As Needed for Chest Pain., Disp: , Rfl:   •  omeprazole (priLOSEC) 20 MG capsule, Take 20 mg by mouth Daily., Disp: , Rfl:   •  potassium chloride 10 MEQ CR tablet, Take 10 mEq by mouth Daily., Disp: , Rfl:   •  rivaroxaban (XARELTO) 20 MG tablet, Take 1 tablet by mouth every night at  "bedtime. AFIB, Disp: 30 tablet, Rfl: 5  •  rosuvastatin (CRESTOR) 10 MG tablet, Take 10 mg by mouth Every Night., Disp: , Rfl:   •  sacubitril-valsartan (Entresto) 24-26 MG tablet, Take 1 tablet by mouth 2 (Two) Times a Day., Disp: 60 tablet, Rfl: 3  •  silver sulfadiazine (SILVADENE, SSD) 1 % cream, Apply 1 application topically to the appropriate area as directed 2 (Two) Times a Day. Applies Buttocks and legs, Disp: , Rfl:   •  tamsulosin (FLOMAX) 0.4 MG capsule 24 hr capsule, Take 1 capsule by mouth Daily., Disp: , Rfl:     The following portions of the patient's history were reviewed and updated as appropriate: allergies, current medications, past family history, past medical history, past social history, past surgical history and problem list.    Social History     Tobacco Use   • Smoking status: Every Day     Packs/day: 0.50     Years: 10.00     Pack years: 5.00     Types: Cigarettes   Vaping Use   • Vaping Use: Unknown   Substance Use Topics   • Alcohol use: Not Currently   • Drug use: Not Currently         Objective   Vitals:    10/28/22 0928   BP: 133/83   BP Location: Left arm   Patient Position: Sitting   Cuff Size: Adult   Pulse: 76   SpO2: 97%   Weight: (!) 200 kg (440 lb)   Height: 177.8 cm (70\")     Body mass index is 63.13 kg/m².    Constitutional:       General: Not in acute distress.     Appearance: Healthy appearance. Well-developed and not in distress. Not diaphoretic.      Comments: Severe morbid obesity and is wheelchair-bound wearing oxygen via nasal cannula   Eyes:      Conjunctiva/sclera: Conjunctivae normal.      Pupils: Pupils are equal, round, and reactive to light.   HENT:      Head: Normocephalic and atraumatic.   Neck:      Vascular: No carotid bruit or JVD.   Pulmonary:      Effort: Pulmonary effort is normal. No respiratory distress.      Breath sounds: Normal breath sounds.      Comments: Wearing oxygen via nasal cannula  Cardiovascular:      Normal rate. Regular rhythm.   Edema:    "  Peripheral edema absent.   Skin:     General: Skin is cool.   Neurological:      Mental Status: Alert, oriented to person, place, and time and oriented to person, place and time.         Lab Results   Component Value Date     07/27/2022    K 4.5 07/27/2022     07/27/2022    CO2 26.0 07/27/2022    BUN 12 07/27/2022    CREATININE 0.72 (L) 07/27/2022    GLUCOSE 94 07/27/2022    CALCIUM 9.1 07/27/2022    AST 88 (H) 06/15/2021    ALT 48 (H) 06/15/2021    ALKPHOS 118 (H) 06/15/2021     Lab Results   Component Value Date    CKTOTAL 61 06/15/2021     Lab Results   Component Value Date    WBC 4.74 07/27/2022    HGB 12.6 (L) 07/27/2022    HCT 40.5 07/27/2022     07/27/2022     Lab Results   Component Value Date    INR 1.13 (H) 06/17/2021    INR 1.63 (H) 08/15/2017     Lab Results   Component Value Date    MG 1.8 07/27/2022     Lab Results   Component Value Date    TSH 0.559 06/15/2021      No results found for: BNP    During this visit the following were done:  Labs Reviewed []    Labs Ordered []    Radiology Reports Reviewed []    Radiology Ordered []    PCP Records Reviewed []    Referring Provider Records Reviewed []    ER Records Reviewed []    Hospital Records Reviewed []    History Obtained From Family []    Radiology Images Reviewed []    Other Reviewed []    Records Requested []       Procedures    Assessment & Plan    Diagnosis Plan   1. Paroxysmal atrial fibrillation (HCC)                 Recommendations:  1. Paroxysmal atrial fibrillation with concern for tachybradycardia syndrome.  a. Patient's monitor was discussed and reviewed with the patient today.  Ultimately there is high concern for tachybradycardia syndrome as he did have a 4-second pause at 4:01 PM.  Although he is fairly asymptomatic denying any syncope or near syncope.  I did discuss the case with Dr. Mendes since he is fairly asymptomatic we will try to get him scheduled to see electrophysiology for consideration of permanent  pacemaker implantation to better treat his tachybradycardia syndrome.  2. Severe obesity  a. I did have a thorough conversation with Adriano regarding his weight I did inform him that likely his uncontrolled atrial fibrillation is likely being precipitated by his weight as well as his sleep apnea.  I emphasized importance on CPAP compliance and strongly encouraged significant lifestyle changes.  One such example is eliminating soda he does report he drinks a regular 7-Up at least 4-5 times daily.     I did spent 45 minutes with the patient face to face discussing test results and treatment options. I spent an additional 15 minutes documenting the encounter.     No follow-ups on file.    As always, I appreciate very much the opportunity to participate in the cardiovascular care of your patients.      With Best Regards,    Mark Durán PA-C

## 2022-11-01 ENCOUNTER — OFFICE VISIT (OUTPATIENT)
Dept: CARDIOLOGY | Facility: CLINIC | Age: 52
End: 2022-11-01

## 2022-11-01 VITALS
WEIGHT: 315 LBS | OXYGEN SATURATION: 96 % | SYSTOLIC BLOOD PRESSURE: 138 MMHG | HEART RATE: 82 BPM | DIASTOLIC BLOOD PRESSURE: 80 MMHG | HEIGHT: 69 IN | BODY MASS INDEX: 46.65 KG/M2

## 2022-11-01 DIAGNOSIS — E66.2 CLASS 3 OBESITY WITH ALVEOLAR HYPOVENTILATION, SERIOUS COMORBIDITY, AND BODY MASS INDEX (BMI) OF 60.0 TO 69.9 IN ADULT: ICD-10-CM

## 2022-11-01 DIAGNOSIS — R00.1 BRADYCARDIA: ICD-10-CM

## 2022-11-01 DIAGNOSIS — I48.11 LONGSTANDING PERSISTENT ATRIAL FIBRILLATION: Primary | ICD-10-CM

## 2022-11-01 PROCEDURE — 99214 OFFICE O/P EST MOD 30 MIN: CPT | Performed by: STUDENT IN AN ORGANIZED HEALTH CARE EDUCATION/TRAINING PROGRAM

## 2022-11-01 PROCEDURE — 93000 ELECTROCARDIOGRAM COMPLETE: CPT | Performed by: STUDENT IN AN ORGANIZED HEALTH CARE EDUCATION/TRAINING PROGRAM

## 2022-11-03 RX ORDER — ISOSORBIDE MONONITRATE 60 MG/1
TABLET, EXTENDED RELEASE ORAL
Qty: 30 TABLET | Refills: 3 | Status: SHIPPED | OUTPATIENT
Start: 2022-11-03 | End: 2022-12-13

## 2022-11-14 ENCOUNTER — HOSPITAL ENCOUNTER (EMERGENCY)
Facility: HOSPITAL | Age: 52
Discharge: HOME | End: 2022-11-15
Attending: EMERGENCY MEDICINE | Admitting: EMERGENCY MEDICINE

## 2022-11-14 DIAGNOSIS — R10.13 EPIGASTRIC PAIN: Primary | ICD-10-CM

## 2022-11-14 LAB
BASOPHILS # BLD AUTO: 0.02 10*3/MM3 (ref 0–0.2)
BASOPHILS NFR BLD AUTO: 0.5 % (ref 0–1.5)
DEPRECATED RDW RBC AUTO: 57.9 FL (ref 37–54)
EOSINOPHIL # BLD AUTO: 0.17 10*3/MM3 (ref 0–0.4)
EOSINOPHIL NFR BLD AUTO: 4.3 % (ref 0.3–6.2)
ERYTHROCYTE [DISTWIDTH] IN BLOOD BY AUTOMATED COUNT: 16.1 % (ref 12.3–15.4)
HCT VFR BLD AUTO: 39.9 % (ref 37.5–51)
HGB BLD-MCNC: 13.1 G/DL (ref 13–17.7)
IMM GRANULOCYTES # BLD AUTO: 0 10*3/MM3 (ref 0–0.05)
IMM GRANULOCYTES NFR BLD AUTO: 0 % (ref 0–0.5)
LYMPHOCYTES # BLD AUTO: 0.9 10*3/MM3 (ref 0.7–3.1)
LYMPHOCYTES NFR BLD AUTO: 22.6 % (ref 19.6–45.3)
MCH RBC QN AUTO: 31.9 PG (ref 26.6–33)
MCHC RBC AUTO-ENTMCNC: 32.8 G/DL (ref 31.5–35.7)
MCV RBC AUTO: 97.1 FL (ref 79–97)
MONOCYTES # BLD AUTO: 0.4 10*3/MM3 (ref 0.1–0.9)
MONOCYTES NFR BLD AUTO: 10.1 % (ref 5–12)
NEUTROPHILS NFR BLD AUTO: 2.49 10*3/MM3 (ref 1.7–7)
NEUTROPHILS NFR BLD AUTO: 62.5 % (ref 42.7–76)
NRBC BLD AUTO-RTO: 0 /100 WBC (ref 0–0.2)
PLATELET # BLD AUTO: 222 10*3/MM3 (ref 140–450)
PMV BLD AUTO: 10.3 FL (ref 6–12)
RBC # BLD AUTO: 4.11 10*6/MM3 (ref 4.14–5.8)
WBC NRBC COR # BLD: 3.98 10*3/MM3 (ref 3.4–10.8)

## 2022-11-14 PROCEDURE — 80053 COMPREHEN METABOLIC PANEL: CPT | Performed by: PHYSICIAN ASSISTANT

## 2022-11-14 PROCEDURE — 85025 COMPLETE CBC W/AUTO DIFF WBC: CPT | Performed by: PHYSICIAN ASSISTANT

## 2022-11-14 PROCEDURE — 83690 ASSAY OF LIPASE: CPT | Performed by: EMERGENCY MEDICINE

## 2022-11-14 PROCEDURE — 99283 EMERGENCY DEPT VISIT LOW MDM: CPT

## 2022-11-14 PROCEDURE — 36415 COLL VENOUS BLD VENIPUNCTURE: CPT

## 2022-11-14 PROCEDURE — 84484 ASSAY OF TROPONIN QUANT: CPT | Performed by: PHYSICIAN ASSISTANT

## 2022-11-14 RX ORDER — SODIUM CHLORIDE 0.9 % (FLUSH) 0.9 %
10 SYRINGE (ML) INJECTION AS NEEDED
Status: DISCONTINUED | OUTPATIENT
Start: 2022-11-14 | End: 2022-11-15 | Stop reason: HOSPADM

## 2022-11-15 ENCOUNTER — APPOINTMENT (OUTPATIENT)
Dept: GENERAL RADIOLOGY | Facility: HOSPITAL | Age: 52
End: 2022-11-15

## 2022-11-15 ENCOUNTER — APPOINTMENT (OUTPATIENT)
Dept: CT IMAGING | Facility: HOSPITAL | Age: 52
End: 2022-11-15

## 2022-11-15 VITALS
HEIGHT: 69 IN | DIASTOLIC BLOOD PRESSURE: 82 MMHG | HEART RATE: 96 BPM | RESPIRATION RATE: 22 BRPM | TEMPERATURE: 98.6 F | OXYGEN SATURATION: 100 % | WEIGHT: 315 LBS | SYSTOLIC BLOOD PRESSURE: 113 MMHG | BODY MASS INDEX: 46.65 KG/M2

## 2022-11-15 LAB
ALBUMIN SERPL-MCNC: 3.18 G/DL (ref 3.5–5.2)
ALBUMIN/GLOB SERPL: 0.7 G/DL
ALP SERPL-CCNC: 131 U/L (ref 39–117)
ALT SERPL W P-5'-P-CCNC: 34 U/L (ref 1–41)
ANION GAP SERPL CALCULATED.3IONS-SCNC: 10.5 MMOL/L (ref 5–15)
AST SERPL-CCNC: 67 U/L (ref 1–40)
BILIRUB SERPL-MCNC: 0.6 MG/DL (ref 0–1.2)
BUN SERPL-MCNC: 5 MG/DL (ref 6–20)
BUN/CREAT SERPL: 8.1 (ref 7–25)
CALCIUM SPEC-SCNC: 9 MG/DL (ref 8.6–10.5)
CHLORIDE SERPL-SCNC: 98 MMOL/L (ref 98–107)
CO2 SERPL-SCNC: 30.5 MMOL/L (ref 22–29)
CREAT SERPL-MCNC: 0.62 MG/DL (ref 0.76–1.27)
EGFRCR SERPLBLD CKD-EPI 2021: 115 ML/MIN/1.73
GLOBULIN UR ELPH-MCNC: 4.3 GM/DL
GLUCOSE SERPL-MCNC: 126 MG/DL (ref 65–99)
HOLD SPECIMEN: NORMAL
HOLD SPECIMEN: NORMAL
LIPASE SERPL-CCNC: 22 U/L (ref 13–60)
POTASSIUM SERPL-SCNC: 3.8 MMOL/L (ref 3.5–5.2)
PROT SERPL-MCNC: 7.5 G/DL (ref 6–8.5)
SODIUM SERPL-SCNC: 139 MMOL/L (ref 136–145)
TROPONIN T SERPL-MCNC: <0.01 NG/ML (ref 0–0.03)
WHOLE BLOOD HOLD COAG: NORMAL
WHOLE BLOOD HOLD SPECIMEN: NORMAL

## 2022-11-15 PROCEDURE — 74177 CT ABD & PELVIS W/CONTRAST: CPT

## 2022-11-15 PROCEDURE — 25010000002 IOPAMIDOL 61 % SOLUTION: Performed by: EMERGENCY MEDICINE

## 2022-11-15 PROCEDURE — 71045 X-RAY EXAM CHEST 1 VIEW: CPT

## 2022-11-15 RX ADMIN — IOPAMIDOL 90 ML: 612 INJECTION, SOLUTION INTRAVENOUS at 01:21

## 2022-11-15 NOTE — DISCHARGE INSTRUCTIONS
The testing we have done here looks good.  It is not clear what is causing the symptoms and hopefully it is something minor like a virus.  However we recommend that you be on a liquid diet for the next couple of days and if symptoms persist or are getting worse you should either contact your doctor or return here for reevaluation.

## 2022-11-15 NOTE — ED NOTES
MEDICAL SCREENING:    Reason for Visit: chest pain    Patient initially seen in triage.  The patient was advised further evaluation and diagnostic testing will be needed, some of the treatment and testing will be initiated in the lobby in order to begin the process.  The patient will be returned to the waiting area for the time being and possibly be re-assessed by a subsequent ED provider.  The patient will be brought back to the treatment area in as timely manner as possible.         Dejon Wick II, PA  11/14/22 [id number]

## 2022-11-15 NOTE — ED PROVIDER NOTES
Subjective   History of Present Illness  Patient is a very pleasant 51-year-old white male with chief complaint of upper abdominal pain for the past 2 or 3 days.  He describes it as a knot in that area and that there is associated nausea but he has not had vomiting.  Patient used to weigh 700 pounds and in 2006 at the [facility] the patient had a gastric bypass done.  He had a complicated course but has done well since but he does not vomit.  He has not had diarrhea.  There are no cardiopulmonary symptoms.  There is no radiation of the discomfort.        Review of Systems   Constitutional: Negative.  Negative for fever.   HENT: Negative.    Respiratory: Negative.    Cardiovascular: Negative.  Negative for chest pain.   Gastrointestinal: Negative.  Negative for abdominal pain.   Endocrine: Negative.    Genitourinary: Negative.  Negative for dysuria.   Skin: Negative.    Neurological: Negative.    Psychiatric/Behavioral: Negative.    All other systems reviewed and are negative.      Past Medical History:   Diagnosis Date   • Abnormal ECG    • Aneurysm (Formerly [facility])    • Arrhythmia    • Arthritis    • Asthma    • Atrial fibrillation (Formerly [facility])    • CHF (congestive heart failure) (Formerly [facility])    • Congenital heart disease    • COPD (chronic obstructive pulmonary disease) (Formerly [facility])    • Coronary artery disease    • Diabetes mellitus (Formerly [facility])    • Heart failure with reduced ejection fraction (Formerly [facility]) 07/21/2021   • Hyperlipidemia    • Hypertension    • Myocardial infarction (Formerly [facility])    • Oxygen dependent     AS NEEDED AT BEDTIME 2L   • Seizures (Formerly [facility])    • Sleep apnea        No Known Allergies    Past Surgical History:   Procedure Laterality Date   • ABDOMINAL SURGERY     • AORTIC VALVE REPAIR/REPLACEMENT      Needing pace maker   • CARDIAC CATHETERIZATION     • CAROTID STENT     • CHOLECYSTECTOMY     • GASTRIC BYPASS     • ANAND FILTER INSERTION JUGULAR     • INCISION AND DRAINAGE LEG Right 08/15/2017    Procedure: INCISION AND  DRAINAGE ABSCESS;  Surgeon: Diana Sutherland MD;  Location: [facility] OR;  Service:    • KNEE SURGERY     • WOUND EXPLORATION LEG Right 08/30/2017    Procedure: Exploration right leg wound;  Surgeon: Leo Ragland MD;  Location: [facility] OR;  Service:        Family History   Problem Relation Age of Onset   • Hypertension Mother    • Hypertension Father    • Anemia Father    • Arrhythmia Father    • Asthma Father    • Clotting disorder Father    • Fainting Father    • Heart attack Father    • Heart disease Father    • Heart failure Father        Social History     Socioeconomic History   • Marital status:    Tobacco Use   • Smoking status: Every Day     Packs/day: 1.00     Years: 10.00     Pack years: 10.00     Types: Cigarettes   • Tobacco comments:     Still smoking   Vaping Use   • Vaping Use: Unknown   Substance and Sexual Activity   • Alcohol use: Not Currently   • Drug use: Not Currently   • Sexual activity: Not Currently     Partners: Female           Objective   Physical Exam  Vitals and nursing note reviewed.   Constitutional:       General: He is not in acute distress.     Appearance: He is well-developed. He is obese. He is not ill-appearing, toxic-appearing or diaphoretic.   HENT:      Head: Normocephalic and atraumatic.   Eyes:      Extraocular Movements: Extraocular movements intact.   Cardiovascular:      Rate and Rhythm: Normal rate and regular rhythm.      Heart sounds: Normal heart sounds.   Abdominal:      Palpations: Abdomen is soft.      Tenderness: There is no abdominal tenderness. There is no guarding or rebound.   Musculoskeletal:         General: Normal range of motion.      Cervical back: Normal range of motion and neck supple.   Skin:     General: Skin is warm and dry.   Neurological:      General: No focal deficit present.      Mental Status: He is alert and oriented to person, place, and time.   Psychiatric:         Mood and Affect: Mood normal. Mood is not anxious.         Behavior:  Behavior normal. Behavior is not agitated.         Procedures           ED Course  ED Course as of 11/17/22 0241   Mon Nov 14, 2022   2225 EKG interpretation: Normal sinus rhythm with a rate of 86.  There is right bundle branch block and left anterior hemiblock (bifascicular block)present. [DS]   Tue Nov 15, 2022   0120 Troponin [DS]   0124 There had been a troponin T reflex ordered and the patient was not felt to have acute coronary syndrome and only 1 was recommended. [DS]   0143 Findings of the CT scan are noted.  There is no clinical evidence or suspicion for pancreatitis.  Patient says he has never had any problems like that.  I have told him to be on a liquid diet for the next couple of days even though he only eats very small amounts at a time anyway because of his gastric bypass surgery.  And then he was told that if there are more symptoms that he needs to come back here for reevaluation. [DS]      ED Course User Index  [DS] Jeovanny Gonzalez MD                                           MDM  Number of Diagnoses or Management Options  Epigastric pain: new and requires workup     Amount and/or Complexity of Data Reviewed  Clinical lab tests: reviewed  Tests in the radiology section of CPT®: reviewed        Final diagnoses:   Epigastric pain       ED Disposition  ED Disposition     ED Disposition   Discharge    Condition   Stable    Comment   --             No follow-up provider specified.       Medication List      No changes were made to your prescriptions during this visit.          Jeovanny Gonzalez MD  11/17/22 0241

## 2022-12-13 ENCOUNTER — OFFICE VISIT (OUTPATIENT)
Dept: CARDIOLOGY | Facility: CLINIC | Age: 52
End: 2022-12-13

## 2022-12-13 VITALS
BODY MASS INDEX: 46.65 KG/M2 | WEIGHT: 315 LBS | DIASTOLIC BLOOD PRESSURE: 73 MMHG | SYSTOLIC BLOOD PRESSURE: 109 MMHG | HEIGHT: 69 IN | HEART RATE: 72 BPM

## 2022-12-13 DIAGNOSIS — I48.19 ATRIAL FIBRILLATION, PERSISTENT: Primary | ICD-10-CM

## 2022-12-13 PROCEDURE — 99214 OFFICE O/P EST MOD 30 MIN: CPT | Performed by: PHYSICIAN ASSISTANT

## 2022-12-13 RX ORDER — METOPROLOL SUCCINATE 50 MG/1
50 TABLET, EXTENDED RELEASE ORAL DAILY
Qty: 75 TABLET | Refills: 1 | Status: SHIPPED | OUTPATIENT
Start: 2022-12-13 | End: 2022-12-13 | Stop reason: SDUPTHER

## 2022-12-13 RX ORDER — ISOSORBIDE MONONITRATE 60 MG/1
90 TABLET, EXTENDED RELEASE ORAL EVERY MORNING
Qty: 90 TABLET | Refills: 3 | Status: SHIPPED | OUTPATIENT
Start: 2022-12-13

## 2022-12-13 RX ORDER — METOPROLOL SUCCINATE 50 MG/1
75 TABLET, EXTENDED RELEASE ORAL DAILY
Qty: 75 TABLET | Refills: 1 | Status: SHIPPED | OUTPATIENT
Start: 2022-12-13 | End: 2022-12-15 | Stop reason: SDUPTHER

## 2022-12-13 NOTE — PROGRESS NOTES
Fernando Whitaker APRN  Adriano Henry  1970 12/13/2022    Patient Active Problem List   Diagnosis   • Cellulitis and abscess of lower extremity   • Hematoma   • Paroxysmal atrial fibrillation (HCC)   • Heart failure with reduced ejection fraction (HCC)   • Atrial fibrillation, persistent (HCC)       Dear Fernando Whitaker APRN:    Subjective     History of Present Illness:    Chief Complaint   Patient presents with   • Follow-up     ROUTINE    Wants to start [facility]ANT[facility] US TO WRITE IT       Medical history   1. Tachybradycardia syndrome  2. Persistent atrial fibrillation  a. ECV 06/18/2021  b. Attempted Tikosyn initiation, canceled due to prolonged QTC 7/2022  c. Failed ECV 7/26/2022  d. Event monitor  3. HFrEF, ischemic cardiomyopathy  a. Stress test 6/15/2021: Medium sized infarct located in the inferior wall and apex with no significant ischemia noted, abnormal LV wall motion consistent with dyskinesis of the inferior wall and LV apex.  EF 41%.  Intermediate risk study.  b. Echo 6/15/2021: EF 41 to 45%, the following LV wall segments are hypokinetic: Apical anterior, apical lateral, apical inferior, apical septal and apex.  LA moderately dilated, trace AR.  Moderate posterior mitral leaflet thickening, moderate mitral regurgitation.  4. Diastolic dysfunction  5. Morbid obesity, BMI 71  1. Gastric sleeve, 2006 peak weight 700 lbs (as of 12/13/2022 482 lbs)  6. Sleep apnea, with CPAP  7. Tobacco abuse        Adriano Henry is a pleasant 52 y.o. male with a past medical history significant for ischemic cardiomyopathy with LVEF of 41 to 45%, persistent atrial fibrillation, tachybradycardia syndrome currently being monitored patient has followed with EP.  He also has history of severe morbid obesity with BMI of 71.  He also has sleep apnea and does report compliance with CPAP.  He comes in today for cardiology follow-up.    Adriano does deny any significant symptoms related to tachybradycardia syndrome such  as syncope or near syncope.  He does have some chronic fatigue and denies any worsening of this.  Unfortunately does look like he has gained more weight roughly 40 pounds since he was last seen by us.  He denies any chest pains today or worsening shortness of breath from baseline although he does also have chronic dyspnea he denies worsening orthopnea or PND.    No Known Allergies:      Current Outpatient Medications:   •  albuterol (PROVENTIL HFA;VENTOLIN HFA) 108 (90 Base) MCG/ACT inhaler, Inhale 2 puffs Every 4 (Four) Hours As Needed for Shortness of Air., Disp: , Rfl:   •  allopurinol (ZYLOPRIM) 300 MG tablet, Take 300 mg by mouth Daily., Disp: , Rfl:   •  ARIPiprazole (ABILIFY) 10 MG tablet, Take 10 mg by mouth Daily., Disp: , Rfl:   •  aspirin (aspirin) 81 MG EC tablet, Take 1 tablet by mouth Daily., Disp: 30 tablet, Rfl: 5  •  azelastine (ASTELIN) 0.1 % nasal spray, 1 spray into the nostril(s) as directed by provider 2 (Two) Times a Day. Use in each nostril as directed, Disp: , Rfl:   •  budesonide-formoterol (SYMBICORT) 160-4.5 MCG/ACT inhaler, Inhale 2 puffs 2 (Two) Times a Day., Disp: , Rfl:   •  buprenorphine-naloxone (SUBOXONE) 8-2 MG per SL tablet, Place 2.25 tablets under the tongue Daily., Disp: , Rfl:   •  colchicine 0.6 MG tablet, Take 0.6 mg by mouth 3 (Three) Times a Day As Needed (gout flare)., Disp: , Rfl:   •  FLUoxetine (PROzac) 20 MG capsule, Take 20 mg by mouth Daily., Disp: , Rfl:   •  furosemide (LASIX) 40 MG tablet, Take 40 mg by mouth As Needed., Disp: , Rfl:   •  gabapentin (NEURONTIN) 400 MG capsule, Take 600 mg by mouth 3 (Three) Times a Day., Disp: , Rfl:   •  hydrOXYzine (ATARAX) 50 MG tablet, Take 1 tablet by mouth As Needed., Disp: , Rfl:   •  levETIRAcetam (KEPPRA) 750 MG tablet, Take 750 mg by mouth 2 (Two) Times a Day., Disp: , Rfl:   •  meloxicam (MOBIC) 15 MG tablet, Take 15 mg by mouth Daily., Disp: , Rfl:   •  metoprolol succinate XL (TOPROL-XL) 100 MG 24 hr tablet, Take 1  tablet by mouth Daily., Disp: 30 tablet, Rfl: 3  •  multivitamin with minerals tablet tablet, Take 1 tablet by mouth Daily., Disp: , Rfl:   •  niacin (SLO-NIACIN) 500 MG CR tablet, Take 500 mg by mouth Daily., Disp: , Rfl:   •  nitroglycerin (NITROLINGUAL) 0.4 MG/SPRAY spray, Place 1 spray under the tongue Every 5 (Five) Minutes As Needed for Chest Pain., Disp: , Rfl:   •  O2 (OXYGEN), Inhale 2 L/min Continuous., Disp: , Rfl:   •  omeprazole (priLOSEC) 20 MG capsule, Take 20 mg by mouth Daily., Disp: , Rfl:   •  potassium chloride 10 MEQ CR tablet, Take 10 mEq by mouth Daily., Disp: , Rfl:   •  rivaroxaban (XARELTO) 20 MG tablet, Take 1 tablet by mouth every night at bedtime. AFIB, Disp: 30 tablet, Rfl: 5  •  rosuvastatin (CRESTOR) 10 MG tablet, Take 10 mg by mouth Every Night., Disp: , Rfl:   •  sacubitril-valsartan (Entresto) 24-26 MG tablet, Take 1 tablet by mouth 2 (Two) Times a Day., Disp: 60 tablet, Rfl: 3  •  tamsulosin (FLOMAX) 0.4 MG capsule 24 hr capsule, Take 1 capsule by mouth Daily., Disp: , Rfl:   •  isosorbide mononitrate (IMDUR) 60 MG 24 hr tablet, Take 1.5 tablets by mouth Every Morning., Disp: 90 tablet, Rfl: 3  •  silver sulfadiazine (SILVADENE, SSD) 1 % cream, Apply 1 application topically to the appropriate area as directed 2 (Two) Times a Day. Applies Buttocks and legs, Disp: , Rfl:     The following portions of the patient's history were reviewed and updated as appropriate: allergies, current medications, past family history, past medical history, past social history, past surgical history and problem list.    Social History     Tobacco Use   • Smoking status: Every Day     Packs/day: 1.00     Years: 10.00     Pack years: 10.00     Types: Cigarettes   • Tobacco comments:     Still smoking   Vaping Use   • Vaping Use: Unknown   Substance Use Topics   • Alcohol use: Not Currently   • Drug use: Not Currently         Objective   Vitals:    12/13/22 1453   BP: 109/73   Pulse: 72   Weight: (!) 219  "kg (482 lb)   Height: 175.3 cm (69\")     Body mass index is 71.18 kg/m².    Constitutional:       General: Not in acute distress.     Appearance: Healthy appearance. Well-developed and not in distress. Not diaphoretic.   Eyes:      Conjunctiva/sclera: Conjunctivae normal.      Pupils: Pupils are equal, round, and reactive to light.   HENT:      Head: Normocephalic and atraumatic.   Neck:      Vascular: No carotid bruit or JVD.   Pulmonary:      Effort: Pulmonary effort is normal. No respiratory distress.      Breath sounds: Normal breath sounds.   Cardiovascular:      Normal rate. Regular rhythm.   Skin:     General: Skin is cool.   Neurological:      Mental Status: Alert, oriented to person, place, and time and oriented to person, place and time.         Lab Results   Component Value Date     11/14/2022    K 3.8 11/14/2022    CL 98 11/14/2022    CO2 30.5 (H) 11/14/2022    BUN 5 (L) 11/14/2022    CREATININE 0.62 (L) 11/14/2022    GLUCOSE 126 (H) 11/14/2022    CALCIUM 9.0 11/14/2022    AST 67 (H) 11/14/2022    ALT 34 11/14/2022    ALKPHOS 131 (H) 11/14/2022     Lab Results   Component Value Date    CKTOTAL 61 06/15/2021     Lab Results   Component Value Date    WBC 3.98 11/14/2022    HGB 13.1 11/14/2022    HCT 39.9 11/14/2022     11/14/2022     Lab Results   Component Value Date    INR 1.13 (H) 06/17/2021    INR 1.63 (H) 08/15/2017     Lab Results   Component Value Date    MG 1.8 07/27/2022     Lab Results   Component Value Date    TSH 0.559 06/15/2021      No results found for: BNP    During this visit the following were done:  Labs Reviewed []    Labs Ordered []    Radiology Reports Reviewed []    Radiology Ordered []    PCP Records Reviewed []    Referring Provider Records Reviewed []    ER Records Reviewed []    Hospital Records Reviewed []    History Obtained From Family []    Radiology Images Reviewed []    Other Reviewed []    Records Requested []       Procedures    Assessment & Plan    Diagnosis " Plan   1. Atrial fibrillation, persistent (HCC)             Recommendations:  1. Tachybradycardia syndrome  a. Discussed the case with Dr. Mendes we will try to adjust metoprolol today as pacemaker implantation will be high risk and will have to be done in [facility] or tertiary facility as his weight does exceed our table capacity.  Recent monitor did show atrial fibrillation with some episodes with RVR however overall rate was controlled with some episodes of pauses with the longest being 4 seconds.  He currently is on 125 mg of metoprolol. After discussion with Dr. Mendes, will reduce to 100 mg and monitor response.   2. Obesity  a. Patient is well aware of his weight complicating all aspects of his medical care is actively trying to lose weight he reports very little food intake.  Encouraged him to keep trying recommended calorie counting and regular exercise such as walking even if he can only walk for short periods of time.  3. Tobacco abuse  a. Patient is interested in quitting I will provide him some nicotine patches.    No follow-ups on file.    As always, I appreciate very much the opportunity to participate in the cardiovascular care of your patients.      With Best Regards,    Mark Durán PA-C

## 2022-12-15 RX ORDER — METOPROLOL SUCCINATE 100 MG/1
100 TABLET, EXTENDED RELEASE ORAL DAILY
Qty: 30 TABLET | Refills: 3 | Status: SHIPPED | OUTPATIENT
Start: 2022-12-15

## 2022-12-15 RX ORDER — NICOTINE 21-14-7MG
1 KIT TRANSDERMAL DAILY
Qty: 56 EACH | Refills: 0 | Status: SHIPPED | OUTPATIENT
Start: 2022-12-15 | End: 2023-02-17

## 2022-12-18 ENCOUNTER — HOSPITAL ENCOUNTER (EMERGENCY)
Facility: HOSPITAL | Age: 52
Discharge: HOME | End: 2022-12-18
Attending: STUDENT IN AN ORGANIZED HEALTH CARE EDUCATION/TRAINING PROGRAM | Admitting: STUDENT IN AN ORGANIZED HEALTH CARE EDUCATION/TRAINING PROGRAM

## 2022-12-18 ENCOUNTER — APPOINTMENT (OUTPATIENT)
Dept: GENERAL RADIOLOGY | Facility: HOSPITAL | Age: 52
End: 2022-12-18

## 2022-12-18 VITALS
DIASTOLIC BLOOD PRESSURE: 62 MMHG | HEART RATE: 73 BPM | OXYGEN SATURATION: 100 % | WEIGHT: 315 LBS | RESPIRATION RATE: 22 BRPM | SYSTOLIC BLOOD PRESSURE: 149 MMHG | BODY MASS INDEX: 45.1 KG/M2 | HEIGHT: 70 IN | TEMPERATURE: 97.3 F

## 2022-12-18 DIAGNOSIS — J10.1 INFLUENZA A: Primary | ICD-10-CM

## 2022-12-18 LAB
A-A DO2: 71.6 MMHG (ref 0–300)
ALBUMIN SERPL-MCNC: 2.91 G/DL (ref 3.5–5.2)
ALBUMIN/GLOB SERPL: 0.7 G/DL
ALP SERPL-CCNC: 112 U/L (ref 39–117)
ALT SERPL W P-5'-P-CCNC: 19 U/L (ref 1–41)
ANION GAP SERPL CALCULATED.3IONS-SCNC: 10.5 MMOL/L (ref 5–15)
APTT PPP: 34.1 SECONDS (ref 26.5–34.5)
ARTERIAL PATENCY WRIST A: ABNORMAL
AST SERPL-CCNC: 42 U/L (ref 1–40)
ATMOSPHERIC PRESS: 731 MMHG
BASE EXCESS BLDA CALC-SCNC: 4.1 MMOL/L (ref 0–2)
BASOPHILS # BLD AUTO: 0.02 10*3/MM3 (ref 0–0.2)
BASOPHILS NFR BLD AUTO: 0.4 % (ref 0–1.5)
BDY SITE: ABNORMAL
BILIRUB SERPL-MCNC: 0.5 MG/DL (ref 0–1.2)
BILIRUB UR QL STRIP: NEGATIVE
BODY TEMPERATURE: 0 C
BUN SERPL-MCNC: 7 MG/DL (ref 6–20)
BUN/CREAT SERPL: 12.1 (ref 7–25)
CALCIUM SPEC-SCNC: 8.6 MG/DL (ref 8.6–10.5)
CHLORIDE SERPL-SCNC: 101 MMOL/L (ref 98–107)
CLARITY UR: CLEAR
CO2 BLDA-SCNC: 29.7 MMOL/L (ref 22–33)
CO2 SERPL-SCNC: 26.5 MMOL/L (ref 22–29)
COHGB MFR BLD: 2.9 % (ref 0–5)
COLOR UR: YELLOW
CREAT SERPL-MCNC: 0.58 MG/DL (ref 0.76–1.27)
CRP SERPL-MCNC: <0.3 MG/DL (ref 0–0.5)
D-LACTATE SERPL-SCNC: 3.6 MMOL/L (ref 0.5–2)
DEPRECATED RDW RBC AUTO: 59 FL (ref 37–54)
EGFRCR SERPLBLD CKD-EPI 2021: 116.6 ML/MIN/1.73
EOSINOPHIL # BLD AUTO: 0.15 10*3/MM3 (ref 0–0.4)
EOSINOPHIL NFR BLD AUTO: 2.9 % (ref 0.3–6.2)
ERYTHROCYTE [DISTWIDTH] IN BLOOD BY AUTOMATED COUNT: 16.7 % (ref 12.3–15.4)
FLUAV SUBTYP SPEC NAA+PROBE: DETECTED
FLUBV RNA ISLT QL NAA+PROBE: NOT DETECTED
GAS FLOW AIRWAY: 2 LPM
GLOBULIN UR ELPH-MCNC: 4.4 GM/DL
GLUCOSE SERPL-MCNC: 111 MG/DL (ref 65–99)
GLUCOSE UR STRIP-MCNC: NEGATIVE MG/DL
HCO3 BLDA-SCNC: 28.4 MMOL/L (ref 20–26)
HCT VFR BLD AUTO: 37.9 % (ref 37.5–51)
HCT VFR BLD CALC: 35.6 % (ref 38–51)
HGB BLD-MCNC: 11.9 G/DL (ref 13–17.7)
HGB BLDA-MCNC: 11.6 G/DL (ref 14–18)
HGB UR QL STRIP.AUTO: NEGATIVE
IMM GRANULOCYTES # BLD AUTO: 0.01 10*3/MM3 (ref 0–0.05)
IMM GRANULOCYTES NFR BLD AUTO: 0.2 % (ref 0–0.5)
INHALED O2 CONCENTRATION: 28 %
INR PPP: 1.26 (ref 0.9–1.1)
KETONES UR QL STRIP: NEGATIVE
LEUKOCYTE ESTERASE UR QL STRIP.AUTO: NEGATIVE
LYMPHOCYTES # BLD AUTO: 0.81 10*3/MM3 (ref 0.7–3.1)
LYMPHOCYTES NFR BLD AUTO: 15.9 % (ref 19.6–45.3)
Lab: ABNORMAL
MCH RBC QN AUTO: 30.1 PG (ref 26.6–33)
MCHC RBC AUTO-ENTMCNC: 31.4 G/DL (ref 31.5–35.7)
MCV RBC AUTO: 95.7 FL (ref 79–97)
METHGB BLD QL: 0.3 % (ref 0–3)
MODALITY: ABNORMAL
MONOCYTES # BLD AUTO: 0.43 10*3/MM3 (ref 0.1–0.9)
MONOCYTES NFR BLD AUTO: 8.4 % (ref 5–12)
NEUTROPHILS NFR BLD AUTO: 3.69 10*3/MM3 (ref 1.7–7)
NEUTROPHILS NFR BLD AUTO: 72.2 % (ref 42.7–76)
NITRITE UR QL STRIP: NEGATIVE
NOTE: ABNORMAL
NRBC BLD AUTO-RTO: 0 /100 WBC (ref 0–0.2)
NT-PROBNP SERPL-MCNC: 492.6 PG/ML (ref 0–900)
OXYHGB MFR BLDV: 92.6 % (ref 94–99)
PCO2 BLDA: 40.7 MM HG (ref 35–45)
PCO2 TEMP ADJ BLD: ABNORMAL MM[HG]
PH BLDA: 7.45 PH UNITS (ref 7.35–7.45)
PH UR STRIP.AUTO: 7 [PH] (ref 5–8)
PH, TEMP CORRECTED: ABNORMAL
PLATELET # BLD AUTO: 253 10*3/MM3 (ref 140–450)
PMV BLD AUTO: 10.6 FL (ref 6–12)
PO2 BLDA: 74.4 MM HG (ref 83–108)
PO2 TEMP ADJ BLD: ABNORMAL MM[HG]
POTASSIUM SERPL-SCNC: 3.5 MMOL/L (ref 3.5–5.2)
PROT SERPL-MCNC: 7.3 G/DL (ref 6–8.5)
PROT UR QL STRIP: NEGATIVE
PROTHROMBIN TIME: 16.1 SECONDS (ref 12.1–14.7)
QT INTERVAL: 470 MS
QTC INTERVAL: 562 MS
RBC # BLD AUTO: 3.96 10*6/MM3 (ref 4.14–5.8)
SAO2 % BLDCOA: 95.7 % (ref 94–99)
SARS-COV-2 RNA PNL SPEC NAA+PROBE: NOT DETECTED
SODIUM SERPL-SCNC: 138 MMOL/L (ref 136–145)
SP GR UR STRIP: 1.01 (ref 1–1.03)
TROPONIN T SERPL-MCNC: <0.01 NG/ML (ref 0–0.03)
UROBILINOGEN UR QL STRIP: NORMAL
VENTILATOR MODE: ABNORMAL
WBC NRBC COR # BLD: 5.11 10*3/MM3 (ref 3.4–10.8)

## 2022-12-18 PROCEDURE — 86140 C-REACTIVE PROTEIN: CPT | Performed by: NURSE PRACTITIONER

## 2022-12-18 PROCEDURE — 36600 WITHDRAWAL OF ARTERIAL BLOOD: CPT

## 2022-12-18 PROCEDURE — 85610 PROTHROMBIN TIME: CPT | Performed by: NURSE PRACTITIONER

## 2022-12-18 PROCEDURE — 87636 SARSCOV2 & INF A&B AMP PRB: CPT | Performed by: NURSE PRACTITIONER

## 2022-12-18 PROCEDURE — 83050 HGB METHEMOGLOBIN QUAN: CPT

## 2022-12-18 PROCEDURE — 93010 ELECTROCARDIOGRAM REPORT: CPT | Performed by: SPECIALIST

## 2022-12-18 PROCEDURE — 82805 BLOOD GASES W/O2 SATURATION: CPT

## 2022-12-18 PROCEDURE — 83605 ASSAY OF LACTIC ACID: CPT | Performed by: NURSE PRACTITIONER

## 2022-12-18 PROCEDURE — 82375 ASSAY CARBOXYHB QUANT: CPT

## 2022-12-18 PROCEDURE — 80053 COMPREHEN METABOLIC PANEL: CPT | Performed by: NURSE PRACTITIONER

## 2022-12-18 PROCEDURE — 99284 EMERGENCY DEPT VISIT MOD MDM: CPT

## 2022-12-18 PROCEDURE — 87040 BLOOD CULTURE FOR BACTERIA: CPT | Performed by: NURSE PRACTITIONER

## 2022-12-18 PROCEDURE — 81003 URINALYSIS AUTO W/O SCOPE: CPT | Performed by: NURSE PRACTITIONER

## 2022-12-18 PROCEDURE — 84484 ASSAY OF TROPONIN QUANT: CPT | Performed by: NURSE PRACTITIONER

## 2022-12-18 PROCEDURE — 83880 ASSAY OF NATRIURETIC PEPTIDE: CPT | Performed by: NURSE PRACTITIONER

## 2022-12-18 PROCEDURE — 85025 COMPLETE CBC W/AUTO DIFF WBC: CPT | Performed by: NURSE PRACTITIONER

## 2022-12-18 PROCEDURE — 71045 X-RAY EXAM CHEST 1 VIEW: CPT

## 2022-12-18 PROCEDURE — 93005 ELECTROCARDIOGRAM TRACING: CPT | Performed by: STUDENT IN AN ORGANIZED HEALTH CARE EDUCATION/TRAINING PROGRAM

## 2022-12-18 PROCEDURE — 85730 THROMBOPLASTIN TIME PARTIAL: CPT | Performed by: NURSE PRACTITIONER

## 2022-12-18 RX ORDER — OSELTAMIVIR PHOSPHATE 75 MG/1
75 CAPSULE ORAL 2 TIMES DAILY
Qty: 10 CAPSULE | Refills: 0 | Status: SHIPPED | OUTPATIENT
Start: 2022-12-18 | End: 2022-12-23

## 2022-12-18 NOTE — ED PROVIDER NOTES
Subjective     Shortness of Breath  Severity:  Moderate  Onset quality:  Gradual  Duration:  2 days  Timing:  Constant  Progression:  Worsening  Chronicity:  New  Context: not animal exposure, not fumes, not occupational exposure and not URI    Relieved by:  Nothing  Worsened by:  Nothing  Ineffective treatments:  None tried  Associated symptoms: wheezing    Associated symptoms: no claudication, no headaches, no neck pain, no sputum production, no swollen glands and no vomiting    Risk factors: no recent alcohol use, no hx of cancer, no obesity, no recent surgery and no tobacco use        Review of Systems   Constitutional: Negative.    HENT: Negative.    Eyes: Negative.    Respiratory: Positive for shortness of breath and wheezing. Negative for sputum production.    Cardiovascular: Negative.  Negative for claudication.   Gastrointestinal: Negative.  Negative for vomiting.   Endocrine: Negative.    Genitourinary: Negative.    Musculoskeletal: Negative.  Negative for neck pain.   Skin: Negative.    Allergic/Immunologic: Negative.    Neurological: Negative.  Negative for headaches.   Hematological: Negative.    Psychiatric/Behavioral: Negative.        Past Medical History:   Diagnosis Date   • Abnormal ECG    • Aneurysm ([facility])    • Arrhythmia    • Arthritis    • Asthma    • Atrial fibrillation ([facility])    • CHF (congestive heart failure) ([facility])    • Congenital heart disease    • COPD (chronic obstructive pulmonary disease) ([facility])    • Coronary artery disease    • Diabetes mellitus ([facility])    • Heart failure with reduced ejection fraction ([facility]) 07/21/2021   • Hyperlipidemia    • Hypertension    • Myocardial infarction ([facility])    • Oxygen dependent     AS NEEDED AT BEDTIME 2L   • Seizures ([facility])    • Sleep apnea        No Known Allergies    Past Surgical History:   Procedure Laterality Date   • ABDOMINAL SURGERY     • AORTIC VALVE REPAIR/REPLACEMENT      Needing pace maker   • CARDIAC CATHETERIZATION     • CAROTID STENT     •  CHOLECYSTECTOMY     • GASTRIC BYPASS     • ANAND FILTER INSERTION JUGULAR     • INCISION AND DRAINAGE LEG Right 08/15/2017    Procedure: INCISION AND DRAINAGE ABSCESS;  Surgeon: Diana Sutherland MD;  Location:  [facility] OR;  Service:    • KNEE SURGERY     • WOUND EXPLORATION LEG Right 08/30/2017    Procedure: Exploration right leg wound;  Surgeon: Leo Ragland MD;  Location:  [facility] OR;  Service:        Family History   Problem Relation Age of Onset   • Hypertension Mother    • Hypertension Father    • Anemia Father    • Arrhythmia Father    • Asthma Father    • Clotting disorder Father    • Fainting Father    • Heart attack Father    • Heart disease Father    • Heart failure Father        Social History     Socioeconomic History   • Marital status:    Tobacco Use   • Smoking status: Every Day     Packs/day: 1.00     Years: 10.00     Pack years: 10.00     Types: Cigarettes   • Tobacco comments:     Still smoking   Vaping Use   • Vaping Use: Unknown   Substance and Sexual Activity   • Alcohol use: Not Currently   • Drug use: Not Currently   • Sexual activity: Not Currently     Partners: Female           Objective   Physical Exam  Vitals and nursing note reviewed.   Constitutional:       Appearance: He is well-developed.   HENT:      Head: Normocephalic.      Right Ear: External ear normal.      Left Ear: External ear normal.   Eyes:      Conjunctiva/sclera: Conjunctivae normal.      Pupils: Pupils are equal, round, and reactive to light.   Cardiovascular:      Rate and Rhythm: Normal rate and regular rhythm.      Heart sounds: Normal heart sounds.   Pulmonary:      Effort: Pulmonary effort is normal.      Breath sounds: Normal breath sounds.   Abdominal:      General: Bowel sounds are normal.      Palpations: Abdomen is soft.   Musculoskeletal:         General: Normal range of motion.      Cervical back: Normal range of motion and neck supple.   Skin:     General: Skin is warm and dry.      Capillary  Refill: Capillary refill takes less than 2 seconds.   Neurological:      Mental Status: He is alert and oriented to person, place, and time.   Psychiatric:         Behavior: Behavior normal.         Thought Content: Thought content normal.         Procedures           ED Course                                           MDM    Final diagnoses:   Influenza A       ED Disposition  ED Disposition     ED Disposition   Discharge    Condition   Stable    Comment   --             Fernando Whitaker, APRN  [address] [facility] [address]  [phone]    Schedule an appointment as soon as possible for a visit   For further evaluation         Medication List      New Prescriptions    oseltamivir 75 MG capsule  Commonly known as: Tamiflu  Take 1 capsule by mouth 2 (Two) Times a Day for 5 days.           Where to Get Your Medications      You can get these medications from any pharmacy    Bring a paper prescription for each of these medications  · oseltamivir 75 MG capsule          Chris Wick, APRN  12/18/22 1904

## 2022-12-18 NOTE — DISCHARGE INSTRUCTIONS
Follow up with your primary care provider in 1-2 days.    Return to the emergency room for worsening symptoms

## 2022-12-23 LAB
BACTERIA SPEC AEROBE CULT: NORMAL
BACTERIA SPEC AEROBE CULT: NORMAL

## 2022-12-28 RX ORDER — SACUBITRIL AND VALSARTAN 49; 51 MG/1; MG/1
TABLET, FILM COATED ORAL
Qty: 60 TABLET | Refills: 2 | OUTPATIENT
Start: 2022-12-28

## 2023-01-10 ENCOUNTER — HOSPITAL ENCOUNTER (EMERGENCY)
Facility: HOSPITAL | Age: 53
Discharge: HOME | End: 2023-01-10
Attending: EMERGENCY MEDICINE | Admitting: EMERGENCY MEDICINE
Payer: MEDICARE

## 2023-01-10 ENCOUNTER — APPOINTMENT (OUTPATIENT)
Dept: GENERAL RADIOLOGY | Facility: HOSPITAL | Age: 53
End: 2023-01-10
Payer: MEDICARE

## 2023-01-10 ENCOUNTER — APPOINTMENT (OUTPATIENT)
Dept: CT IMAGING | Facility: HOSPITAL | Age: 53
End: 2023-01-10
Payer: MEDICARE

## 2023-01-10 ENCOUNTER — TELEPHONE (OUTPATIENT)
Dept: CARDIOLOGY | Facility: CLINIC | Age: 53
End: 2023-01-10
Payer: MEDICARE

## 2023-01-10 VITALS
SYSTOLIC BLOOD PRESSURE: 134 MMHG | HEIGHT: 70 IN | WEIGHT: 315 LBS | TEMPERATURE: 97.5 F | HEART RATE: 84 BPM | RESPIRATION RATE: 18 BRPM | DIASTOLIC BLOOD PRESSURE: 79 MMHG | BODY MASS INDEX: 45.1 KG/M2 | OXYGEN SATURATION: 100 %

## 2023-01-10 DIAGNOSIS — I50.9 ACUTE ON CHRONIC CONGESTIVE HEART FAILURE, UNSPECIFIED HEART FAILURE TYPE: Primary | ICD-10-CM

## 2023-01-10 LAB
A-A DO2: 70.4 MMHG (ref 0–300)
ALBUMIN SERPL-MCNC: 3 G/DL (ref 3.5–5.2)
ALBUMIN/GLOB SERPL: 0.7 G/DL
ALP SERPL-CCNC: 121 U/L (ref 39–117)
ALT SERPL W P-5'-P-CCNC: 30 U/L (ref 1–41)
ANION GAP SERPL CALCULATED.3IONS-SCNC: 7 MMOL/L (ref 5–15)
ARTERIAL PATENCY WRIST A: ABNORMAL
AST SERPL-CCNC: 74 U/L (ref 1–40)
ATMOSPHERIC PRESS: 728 MMHG
BACTERIA UR QL AUTO: NORMAL /HPF
BASE EXCESS BLDA CALC-SCNC: 5.9 MMOL/L (ref 0–2)
BASOPHILS # BLD AUTO: 0.02 10*3/MM3 (ref 0–0.2)
BASOPHILS NFR BLD AUTO: 0.4 % (ref 0–1.5)
BDY SITE: ABNORMAL
BILIRUB SERPL-MCNC: 0.4 MG/DL (ref 0–1.2)
BILIRUB UR QL STRIP: NEGATIVE
BODY TEMPERATURE: 0 C
BUN SERPL-MCNC: 7 MG/DL (ref 6–20)
BUN/CREAT SERPL: 12.7 (ref 7–25)
CALCIUM SPEC-SCNC: 8.7 MG/DL (ref 8.6–10.5)
CHLORIDE SERPL-SCNC: 101 MMOL/L (ref 98–107)
CLARITY UR: CLEAR
CO2 BLDA-SCNC: 31.9 MMOL/L (ref 22–33)
CO2 SERPL-SCNC: 29 MMOL/L (ref 22–29)
COHGB MFR BLD: 1.9 % (ref 0–5)
COLOR UR: YELLOW
CREAT SERPL-MCNC: 0.55 MG/DL (ref 0.76–1.27)
CRP SERPL-MCNC: 0.32 MG/DL (ref 0–0.5)
D DIMER PPP FEU-MCNC: 0.73 MCGFEU/ML (ref 0–0.53)
D-LACTATE SERPL-SCNC: 2 MMOL/L (ref 0.5–2)
DEPRECATED RDW RBC AUTO: 61.8 FL (ref 37–54)
EGFRCR SERPLBLD CKD-EPI 2021: 118.5 ML/MIN/1.73
EOSINOPHIL # BLD AUTO: 0.13 10*3/MM3 (ref 0–0.4)
EOSINOPHIL NFR BLD AUTO: 2.5 % (ref 0.3–6.2)
ERYTHROCYTE [DISTWIDTH] IN BLOOD BY AUTOMATED COUNT: 17.2 % (ref 12.3–15.4)
ERYTHROCYTE [SEDIMENTATION RATE] IN BLOOD: 48 MM/HR (ref 0–20)
FLUAV RNA RESP QL NAA+PROBE: NOT DETECTED
FLUBV RNA RESP QL NAA+PROBE: NOT DETECTED
GAS FLOW AIRWAY: 2 LPM
GLOBULIN UR ELPH-MCNC: 4.3 GM/DL
GLUCOSE SERPL-MCNC: 110 MG/DL (ref 65–99)
GLUCOSE UR STRIP-MCNC: NEGATIVE MG/DL
HCO3 BLDA-SCNC: 30.5 MMOL/L (ref 20–26)
HCT VFR BLD AUTO: 37.1 % (ref 37.5–51)
HCT VFR BLD CALC: 34.6 % (ref 38–51)
HGB BLD-MCNC: 11.6 G/DL (ref 13–17.7)
HGB BLDA-MCNC: 11.3 G/DL (ref 14–18)
HGB UR QL STRIP.AUTO: NEGATIVE
HOLD SPECIMEN: NORMAL
HOLD SPECIMEN: NORMAL
HYALINE CASTS UR QL AUTO: NORMAL /LPF
IMM GRANULOCYTES # BLD AUTO: 0.02 10*3/MM3 (ref 0–0.05)
IMM GRANULOCYTES NFR BLD AUTO: 0.4 % (ref 0–0.5)
INHALED O2 CONCENTRATION: 28 %
KETONES UR QL STRIP: NEGATIVE
LEUKOCYTE ESTERASE UR QL STRIP.AUTO: ABNORMAL
LYMPHOCYTES # BLD AUTO: 0.63 10*3/MM3 (ref 0.7–3.1)
LYMPHOCYTES NFR BLD AUTO: 12.3 % (ref 19.6–45.3)
Lab: ABNORMAL
MCH RBC QN AUTO: 30.4 PG (ref 26.6–33)
MCHC RBC AUTO-ENTMCNC: 31.3 G/DL (ref 31.5–35.7)
MCV RBC AUTO: 97.4 FL (ref 79–97)
METHGB BLD QL: 0.3 % (ref 0–3)
MODALITY: ABNORMAL
MONOCYTES # BLD AUTO: 0.25 10*3/MM3 (ref 0.1–0.9)
MONOCYTES NFR BLD AUTO: 4.9 % (ref 5–12)
NEUTROPHILS NFR BLD AUTO: 4.08 10*3/MM3 (ref 1.7–7)
NEUTROPHILS NFR BLD AUTO: 79.5 % (ref 42.7–76)
NITRITE UR QL STRIP: NEGATIVE
NOTE: ABNORMAL
NRBC BLD AUTO-RTO: 0 /100 WBC (ref 0–0.2)
NT-PROBNP SERPL-MCNC: 314.9 PG/ML (ref 0–900)
OXYHGB MFR BLDV: 93.3 % (ref 94–99)
PCO2 BLDA: 43.5 MM HG (ref 35–45)
PCO2 TEMP ADJ BLD: ABNORMAL MM[HG]
PH BLDA: 7.45 PH UNITS (ref 7.35–7.45)
PH UR STRIP.AUTO: 8.5 [PH] (ref 5–8)
PH, TEMP CORRECTED: ABNORMAL
PLATELET # BLD AUTO: 232 10*3/MM3 (ref 140–450)
PMV BLD AUTO: 10.4 FL (ref 6–12)
PO2 BLDA: 71.6 MM HG (ref 83–108)
PO2 TEMP ADJ BLD: ABNORMAL MM[HG]
POTASSIUM SERPL-SCNC: 4.2 MMOL/L (ref 3.5–5.2)
PROT SERPL-MCNC: 7.3 G/DL (ref 6–8.5)
PROT UR QL STRIP: NEGATIVE
RBC # BLD AUTO: 3.81 10*6/MM3 (ref 4.14–5.8)
RBC # UR STRIP: NORMAL /HPF
REF LAB TEST METHOD: NORMAL
SAO2 % BLDCOA: 95.4 % (ref 94–99)
SARS-COV-2 RNA RESP QL NAA+PROBE: NOT DETECTED
SODIUM SERPL-SCNC: 137 MMOL/L (ref 136–145)
SP GR UR STRIP: 1.01 (ref 1–1.03)
SQUAMOUS #/AREA URNS HPF: NORMAL /HPF
TROPONIN T SERPL-MCNC: <0.01 NG/ML (ref 0–0.03)
UROBILINOGEN UR QL STRIP: ABNORMAL
VENTILATOR MODE: ABNORMAL
WBC # UR STRIP: NORMAL /HPF
WBC NRBC COR # BLD: 5.13 10*3/MM3 (ref 3.4–10.8)
WHOLE BLOOD HOLD COAG: NORMAL
WHOLE BLOOD HOLD SPECIMEN: NORMAL

## 2023-01-10 PROCEDURE — 87636 SARSCOV2 & INF A&B AMP PRB: CPT | Performed by: PHYSICIAN ASSISTANT

## 2023-01-10 PROCEDURE — 93010 ELECTROCARDIOGRAM REPORT: CPT | Performed by: INTERNAL MEDICINE

## 2023-01-10 PROCEDURE — 83880 ASSAY OF NATRIURETIC PEPTIDE: CPT | Performed by: PHYSICIAN ASSISTANT

## 2023-01-10 PROCEDURE — 36600 WITHDRAWAL OF ARTERIAL BLOOD: CPT

## 2023-01-10 PROCEDURE — 71275 CT ANGIOGRAPHY CHEST: CPT | Performed by: RADIOLOGY

## 2023-01-10 PROCEDURE — 71275 CT ANGIOGRAPHY CHEST: CPT

## 2023-01-10 PROCEDURE — 93005 ELECTROCARDIOGRAM TRACING: CPT | Performed by: PHYSICIAN ASSISTANT

## 2023-01-10 PROCEDURE — 96374 THER/PROPH/DIAG INJ IV PUSH: CPT

## 2023-01-10 PROCEDURE — 87040 BLOOD CULTURE FOR BACTERIA: CPT | Performed by: PHYSICIAN ASSISTANT

## 2023-01-10 PROCEDURE — 71045 X-RAY EXAM CHEST 1 VIEW: CPT | Performed by: RADIOLOGY

## 2023-01-10 PROCEDURE — 80053 COMPREHEN METABOLIC PANEL: CPT | Performed by: PHYSICIAN ASSISTANT

## 2023-01-10 PROCEDURE — 85379 FIBRIN DEGRADATION QUANT: CPT | Performed by: PHYSICIAN ASSISTANT

## 2023-01-10 PROCEDURE — 0 IOPAMIDOL PER 1 ML: Performed by: EMERGENCY MEDICINE

## 2023-01-10 PROCEDURE — 71045 X-RAY EXAM CHEST 1 VIEW: CPT

## 2023-01-10 PROCEDURE — 83050 HGB METHEMOGLOBIN QUAN: CPT

## 2023-01-10 PROCEDURE — 86140 C-REACTIVE PROTEIN: CPT | Performed by: PHYSICIAN ASSISTANT

## 2023-01-10 PROCEDURE — 82375 ASSAY CARBOXYHB QUANT: CPT

## 2023-01-10 PROCEDURE — 85652 RBC SED RATE AUTOMATED: CPT | Performed by: PHYSICIAN ASSISTANT

## 2023-01-10 PROCEDURE — 84484 ASSAY OF TROPONIN QUANT: CPT | Performed by: PHYSICIAN ASSISTANT

## 2023-01-10 PROCEDURE — 25010000002 FUROSEMIDE PER 20 MG: Performed by: PHYSICIAN ASSISTANT

## 2023-01-10 PROCEDURE — 81001 URINALYSIS AUTO W/SCOPE: CPT | Performed by: PHYSICIAN ASSISTANT

## 2023-01-10 PROCEDURE — 85025 COMPLETE CBC W/AUTO DIFF WBC: CPT | Performed by: PHYSICIAN ASSISTANT

## 2023-01-10 PROCEDURE — 99284 EMERGENCY DEPT VISIT MOD MDM: CPT

## 2023-01-10 PROCEDURE — 82805 BLOOD GASES W/O2 SATURATION: CPT

## 2023-01-10 PROCEDURE — 83605 ASSAY OF LACTIC ACID: CPT | Performed by: PHYSICIAN ASSISTANT

## 2023-01-10 RX ORDER — FUROSEMIDE 10 MG/ML
80 INJECTION INTRAMUSCULAR; INTRAVENOUS ONCE
Status: COMPLETED | OUTPATIENT
Start: 2023-01-10 | End: 2023-01-10

## 2023-01-10 RX ADMIN — IOPAMIDOL 100 ML: 755 INJECTION, SOLUTION INTRAVENOUS at 12:17

## 2023-01-10 RX ADMIN — FUROSEMIDE 80 MG: 10 INJECTION, SOLUTION INTRAMUSCULAR; INTRAVENOUS at 14:20

## 2023-01-10 NOTE — TELEPHONE ENCOUNTER
Called pt and advised him to contact his PCP for inhaler. He expressed understanding. His wife was with him and stated he was in the ER due to edema. Pt had an apt with [facility] on 1/25. I moved his apt up to 1/12 with [facility].

## 2023-01-10 NOTE — ED PROVIDER NOTES
Subjective   History of Present Illness  52-year-old male presents secondary to shortness of breath over the past 2 days.  Patient has a history of COPD along with congestive heart failure.  He is on 2 L of oxygen at all times.  He states he has had some wheezing.  Denies any fever.  Patient recently had influenza.  He denies any increased weight or increased swelling.  He denies any chest pain pressure tightness squeezing.  He voices no other complaints this time.  Symptoms are a mild to moderate.  Patient's oxygen saturation noted to be 100%.        Review of Systems    Past Medical History:   Diagnosis Date   • Abnormal ECG    • Aneurysm ([facility])    • Arrhythmia    • Arthritis    • Asthma    • Atrial fibrillation ([facility])    • CHF (congestive heart failure) ([facility])    • Congenital heart disease    • COPD (chronic obstructive pulmonary disease) ([facility])    • Coronary artery disease    • Diabetes mellitus ([facility])    • Heart failure with reduced ejection fraction ([facility]) 07/21/2021   • Hyperlipidemia    • Hypertension    • Myocardial infarction ([facility])    • Oxygen dependent     AS NEEDED AT BEDTIME 2L   • Seizures ([facility])    • Sleep apnea        No Known Allergies    Past Surgical History:   Procedure Laterality Date   • ABDOMINAL SURGERY     • AORTIC VALVE REPAIR/REPLACEMENT      Needing pace maker   • CARDIAC CATHETERIZATION     • CAROTID STENT     • CHOLECYSTECTOMY     • GASTRIC BYPASS     • ANAND FILTER INSERTION JUGULAR     • INCISION AND DRAINAGE LEG Right 08/15/2017    Procedure: INCISION AND DRAINAGE ABSCESS;  Surgeon: Diana Sutherland MD;  Location: [facility] OR;  Service:    • KNEE SURGERY     • WOUND EXPLORATION LEG Right 08/30/2017    Procedure: Exploration right leg wound;  Surgeon: Leo Ragland MD;  Location: [facility];  Service:        Family History   Problem Relation Age of Onset   • Hypertension Mother    • Hypertension Father    • Anemia Father    • Arrhythmia Father    • Asthma Father    • Clotting disorder  Father    • Fainting Father    • Heart attack Father    • Heart disease Father    • Heart failure Father        Social History     Socioeconomic History   • Marital status:    Tobacco Use   • Smoking status: Every Day     Packs/day: 1.00     Years: 10.00     Pack years: 10.00     Types: Cigarettes   • Tobacco comments:     Still smoking   Vaping Use   • Vaping Use: Unknown   Substance and Sexual Activity   • Alcohol use: Not Currently   • Drug use: Not Currently   • Sexual activity: Not Currently     Partners: Female           Objective   Physical Exam    Procedures           ED Course  ED Course as of 01/10/23 1933   Tue Loc 10, 2023   1112 ECG 12 Lead Dyspnea  Atrial fibrillation.  Rate 86.  Left axis deviation.  Right bundle branch block.  Prolonged QT.  No acute ischemic changes.  Abnormal EKG.  Interpreted by me.  Electronically signed by Jarod Genao MD, 01/10/23, 11:13 AM EST.   [BC]   1408 Patient's ER course was uneventful.  His oxygen saturation is normal on his routine oxygen.  He is not demonstrated any distress while he has been in the emergency department.  He is chronically in A. fib.  This is rate controlled.  He denies any chest pain pressure tightness or squeezing.  We have given him 80 mg of Lasix IV.  He was counseled to increase his dose of Lasix over the next couple of days.  His renal function is normal.  He was counseled that he should closely follow-up with his primary care.  We will also refer him to the congestive heart failure clinic. [JI]      ED Course User Index  [BC] Jarod Genao MD  [JI] Asif Amanda PA           Results for orders placed or performed during the hospital encounter of 01/10/23   COVID-19 and FLU A/B PCR - Swab, Nasopharynx    Specimen: Nasopharynx; Swab   Result Value Ref Range    COVID19 Not Detected Not Detected - Ref. Range    Influenza A PCR Not Detected Not Detected    Influenza B PCR Not Detected Not Detected   Comprehensive Metabolic Panel     Specimen: Arm, Left; Blood   Result Value Ref Range    Glucose 110 (H) 65 - 99 mg/dL    BUN 7 6 - 20 mg/dL    Creatinine 0.55 (L) 0.76 - 1.27 mg/dL    Sodium 137 136 - 145 mmol/L    Potassium 4.2 3.5 - 5.2 mmol/L    Chloride 101 98 - 107 mmol/L    CO2 29.0 22.0 - 29.0 mmol/L    Calcium 8.7 8.6 - 10.5 mg/dL    Total Protein 7.3 6.0 - 8.5 g/dL    Albumin 3.0 (L) 3.5 - 5.2 g/dL    ALT (SGPT) 30 1 - 41 U/L    AST (SGOT) 74 (H) 1 - 40 U/L    Alkaline Phosphatase 121 (H) 39 - 117 U/L    Total Bilirubin 0.4 0.0 - 1.2 mg/dL    Globulin 4.3 gm/dL    A/G Ratio 0.7 g/dL    BUN/Creatinine Ratio 12.7 7.0 - 25.0    Anion Gap 7.0 5.0 - 15.0 mmol/L    eGFR 118.5 >60.0 mL/min/1.73   Urinalysis With Microscopic If Indicated (No Culture) - Urine, Clean Catch    Specimen: Urine, Clean Catch   Result Value Ref Range    Color, UA Yellow Yellow, Straw    Appearance, UA Clear Clear    pH, UA 8.5 (H) 5.0 - 8.0    Specific Gravity, UA 1.010 1.005 - 1.030    Glucose, UA Negative Negative    Ketones, UA Negative Negative    Bilirubin, UA Negative Negative    Blood, UA Negative Negative    Protein, UA Negative Negative    Leuk Esterase, UA Trace (A) Negative    Nitrite, UA Negative Negative    Urobilinogen, UA 2.0 E.U./dL (A) 0.2 - 1.0 E.U./dL   BNP    Specimen: Arm, Left; Blood   Result Value Ref Range    proBNP 314.9 0.0 - 900.0 pg/mL   Troponin    Specimen: Arm, Left; Blood   Result Value Ref Range    Troponin T <0.010 0.000 - 0.030 ng/mL   D-dimer, Quantitative    Specimen: Arm, Left; Blood   Result Value Ref Range    D-Dimer, Quantitative 0.73 (H) 0.00 - 0.53 MCGFEU/mL   C-reactive Protein    Specimen: Arm, Left; Blood   Result Value Ref Range    C-Reactive Protein 0.32 0.00 - 0.50 mg/dL   Sedimentation Rate    Specimen: Arm, Left; Blood   Result Value Ref Range    Sed Rate 48 (H) 0 - 20 mm/hr   Lactic Acid, Plasma    Specimen: Arm, Left; Blood   Result Value Ref Range    Lactate 2.0 0.5 - 2.0 mmol/L   CBC Auto Differential    Specimen:  Arm, Left; Blood   Result Value Ref Range    WBC 5.13 3.40 - 10.80 10*3/mm3    RBC 3.81 (L) 4.14 - 5.80 10*6/mm3    Hemoglobin 11.6 (L) 13.0 - 17.7 g/dL    Hematocrit 37.1 (L) 37.5 - 51.0 %    MCV 97.4 (H) 79.0 - 97.0 fL    MCH 30.4 26.6 - 33.0 pg    MCHC 31.3 (L) 31.5 - 35.7 g/dL    RDW 17.2 (H) 12.3 - 15.4 %    RDW-SD 61.8 (H) 37.0 - 54.0 fl    MPV 10.4 6.0 - 12.0 fL    Platelets 232 140 - 450 10*3/mm3    Neutrophil % 79.5 (H) 42.7 - 76.0 %    Lymphocyte % 12.3 (L) 19.6 - 45.3 %    Monocyte % 4.9 (L) 5.0 - 12.0 %    Eosinophil % 2.5 0.3 - 6.2 %    Basophil % 0.4 0.0 - 1.5 %    Immature Grans % 0.4 0.0 - 0.5 %    Neutrophils, Absolute 4.08 1.70 - 7.00 10*3/mm3    Lymphocytes, Absolute 0.63 (L) 0.70 - 3.10 10*3/mm3    Monocytes, Absolute 0.25 0.10 - 0.90 10*3/mm3    Eosinophils, Absolute 0.13 0.00 - 0.40 10*3/mm3    Basophils, Absolute 0.02 0.00 - 0.20 10*3/mm3    Immature Grans, Absolute 0.02 0.00 - 0.05 10*3/mm3    nRBC 0.0 0.0 - 0.2 /100 WBC   Blood Gas, Arterial With Co-Ox    Specimen: Arterial Blood   Result Value Ref Range    Site Left Brachial     Avery's Test N/A     pH, Arterial 7.454 (H) 7.350 - 7.450 pH units    pCO2, Arterial 43.5 35.0 - 45.0 mm Hg    pO2, Arterial 71.6 (L) 83.0 - 108.0 mm Hg    HCO3, Arterial 30.5 (H) 20.0 - 26.0 mmol/L    Base Excess, Arterial 5.9 (H) 0.0 - 2.0 mmol/L    O2 Saturation, Arterial 95.4 94.0 - 99.0 %    Hemoglobin, Blood Gas 11.3 (L) 14 - 18 g/dL    Hematocrit, Blood Gas 34.6 (L) 38.0 - 51.0 %    Oxyhemoglobin 93.3 (L) 94 - 99 %    Methemoglobin 0.30 0.00 - 3.00 %    Carboxyhemoglobin 1.9 0 - 5 %    A-a DO2 70.4 0.0 - 300.0 mmHg    CO2 Content 31.9 22 - 33 mmol/L    Temperature 0.0 C    Barometric Pressure for Blood Gas 728 mmHg    Modality Nasal Cannula     FIO2 28 %    Flow Rate 2.0 lpm    Ventilator Mode NA     Note      Collected by [id number]     pH, Temp Corrected      pCO2, Temperature Corrected      pO2, Temperature Corrected     Urinalysis, Microscopic Only - Urine,  Clean Catch    Specimen: Urine, Clean Catch   Result Value Ref Range    RBC, UA 0-2 None Seen, 0-2 /HPF    WBC, UA 0-2 None Seen, 0-2 /HPF    Bacteria, UA None Seen None Seen /HPF    Squamous Epithelial Cells, UA None Seen None Seen, 0-2 /HPF    Hyaline Casts, UA None Seen None Seen /LPF    Methodology Automated Microscopy    ECG 12 Lead Dyspnea   Result Value Ref Range    QT Interval 464 ms    QTC Interval 555 ms   Green Top (Gel)   Result Value Ref Range    Extra Tube Hold for add-ons.    Lavender Top   Result Value Ref Range    Extra Tube hold for add-on    Gold Top - SST   Result Value Ref Range    Extra Tube Hold for add-ons.    Light Blue Top   Result Value Ref Range    Extra Tube Hold for add-ons.      XR Chest 1 View    Result Date: 1/10/2023    Changes of mild CHF. No focal airspace disease identified.  This report was finalized on 1/10/2023 10:53 AM by Dr. Bob Gandhi MD.      CT Angiogram Chest Pulmonary Embolism    Result Date: 1/10/2023  1.  Exam limited for assessment of pulmonary embolism. No central or main vessel PE is definitely identified. Assessment of the distal main pulmonary arteries, segmental branches, and subsegmental branches essentially nondiagnostic. 2.  Cardiomegaly, pulmonary vascular congestion, and interstitial thickening compatible with changes of CHF. No airspace edema or pleural effusions. 3.  Basilar atelectasis. 4.  Other incidental and nonacute findings as above.  This report was finalized on 1/10/2023 12:45 PM by Dr. Bob Gandhi MD.                                      Medical Decision Making  52-year-old male presents secondary to shortness of breath over the past 2 days.  Patient has a history of COPD along with congestive heart failure.  He is on 2 L of oxygen at all times.  He states he has had some wheezing.  Denies any fever.  Patient recently had influenza.  He denies any increased weight or increased swelling.  He denies any chest pain pressure tightness  squeezing.  He voices no other complaints this time.  Symptoms are a mild to moderate.  Patient's oxygen saturation noted to be 100%.    Acute on chronic congestive heart failure, unspecified heart failure type (HCC): acute illness or injury  Amount and/or Complexity of Data Reviewed  Labs: ordered. Decision-making details documented in ED Course.  Radiology: ordered. Decision-making details documented in ED Course.  ECG/medicine tests: ordered. Decision-making details documented in ED Course.      Risk  Prescription drug management.    Risk Details: Patient needs follow-up in the congestive heart failure clinic.  He is been counseled on signs and system related worsening along appropriate follow-up.  He voices understanding.        Final diagnoses:   Acute on chronic congestive heart failure, unspecified heart failure type (HCC)       ED Disposition  ED Disposition     ED Disposition   Discharge    Condition   Stable    Comment   --             Fernando Whitaker, APRN  [address] [facility] [address]  [phone]    Schedule an appointment as soon as possible for a visit       [facility]  [address] [place] [id number] [phone]  Schedule an appointment as soon as possible for a visit            Medication List      No changes were made to your prescriptions during this visit.          Asif Amanda PA  01/10/23 1933

## 2023-01-10 NOTE — TELEPHONE ENCOUNTER
Pt called and said his insurance wont pay for the nicotine patch but said that it would pay for an inhaler or something different

## 2023-01-11 LAB
QT INTERVAL: 464 MS
QTC INTERVAL: 555 MS

## 2023-01-12 ENCOUNTER — OFFICE VISIT (OUTPATIENT)
Dept: CARDIOLOGY | Facility: CLINIC | Age: 53
End: 2023-01-12
Payer: MEDICARE

## 2023-01-12 VITALS
HEART RATE: 81 BPM | HEIGHT: 70 IN | SYSTOLIC BLOOD PRESSURE: 118 MMHG | WEIGHT: 315 LBS | BODY MASS INDEX: 45.1 KG/M2 | DIASTOLIC BLOOD PRESSURE: 71 MMHG

## 2023-01-12 DIAGNOSIS — I50.22 CHRONIC HFREF (HEART FAILURE WITH REDUCED EJECTION FRACTION): Primary | ICD-10-CM

## 2023-01-12 DIAGNOSIS — I48.11 LONGSTANDING PERSISTENT ATRIAL FIBRILLATION: ICD-10-CM

## 2023-01-12 DIAGNOSIS — E66.2 CLASS 3 OBESITY WITH ALVEOLAR HYPOVENTILATION, SERIOUS COMORBIDITY, AND BODY MASS INDEX (BMI) OF 60.0 TO 69.9 IN ADULT: ICD-10-CM

## 2023-01-12 DIAGNOSIS — I42.9 CARDIOMYOPATHY, UNSPECIFIED TYPE: ICD-10-CM

## 2023-01-12 PROCEDURE — 99214 OFFICE O/P EST MOD 30 MIN: CPT | Performed by: INTERNAL MEDICINE

## 2023-01-12 RX ORDER — SPIRONOLACTONE 25 MG/1
25 TABLET ORAL DAILY
Qty: 30 TABLET | Refills: 3 | Status: SHIPPED | OUTPATIENT
Start: 2023-01-12

## 2023-01-12 RX ORDER — DAPAGLIFLOZIN 10 MG/1
10 TABLET, FILM COATED ORAL DAILY
Qty: 30 TABLET | Refills: 3 | Status: SHIPPED | OUTPATIENT
Start: 2023-01-12

## 2023-01-12 NOTE — LETTER
January 18, 2023     ALECIA Skelton  [address]  [facility] [address]    Patient: Adriano Henry   YOB: 1970   Date of Visit: 1/12/2023       Dear Fernando:    Thank you for referring Adriano Henry to me for evaluation. Below are the relevant portions of my assessment and plan of care.    If you have questions, please do not hesitate to call me. I look forward to following Adriano along with you.         Sincerely,        Shashank Mendes MD        CC: No Recipients  Shashank Mendes MD  01/18/23 1841  Sign when Signing Visit  Fernando Whitaker APRN  Adriano Henry  1970 01/12/2023    Patient Active Problem List   Diagnosis   • Cellulitis and abscess of lower extremity   • Hematoma   • Paroxysmal atrial fibrillation (HCC)   • Heart failure with reduced ejection fraction (HCC)   • Atrial fibrillation, persistent (HCC)       Dear Fernando Whitaker APRN:    Subjective      Adriano Henry is a 52 y.o. male with the problems as listed above, presents    Chief complaint: Follow-up of acute on chronic systolic heart failure, persistent atrial fibrillation and mild cardiomyopathy.    History of Present Illness:  is a pleasant 49-year-old  male with history of persistent atrial fibrillation with intermittent rapid ventricle response and mild cardiomyopathy with LV ejection fraction of about 40 to 45% with grade 2 diastolic noncompliance of the left ventricle and moderate mitral regurgitation.  He had attempted cardioversion x2 with successful reversion for statin but then last time it was unsuccessful.  He has baseline prolonged QTC and hence was felt to be not a candidate for any of the class III antiarrhythmic agents.  He is also obviously noted candidate for class Ic antiarrhythmic agents due to structural heart disease and heart failure.  He was recently evaluated in the emergency department at [facility] on 1/10/2023 for complaints of increasing shortness of  breath.  He was noted to be in acute decompensated heart failure.  He was given furosemide 80 mg IV and was discharged home and was referred to heart failure clinic.  He is here today as a follow-up of that visit.      On today's visit he states his breathing is better now than it was 2 days ago.  He does not weigh himself at home.  His weight here showing a relatively stable weight around 460 pounds.  Patient did not have any ischemic evaluation in the past due to his weight of more than 50 pounds in not a candidate to be placed on the table safely in the nuclear lab or cardiac Cath Lab.  He does not have known myocardial infarction.  He denies any chest pain or discomfort.  He is currently on metoprolol succinate 100 mg daily and Entresto 24/26 mg p.o. twice daily.  States he takes his medications regularly.  He is not very meticulous about following a salt restricted diet.  Still smoking about a pack in 3 days.      No Known Allergies:      Current Outpatient Medications:   •  albuterol (PROVENTIL HFA;VENTOLIN HFA) 108 (90 Base) MCG/ACT inhaler, Inhale 2 puffs Every 4 (Four) Hours As Needed for Shortness of Air., Disp: , Rfl:   •  allopurinol (ZYLOPRIM) 300 MG tablet, Take 300 mg by mouth Daily., Disp: , Rfl:   •  ARIPiprazole (ABILIFY) 10 MG tablet, Take 10 mg by mouth Daily., Disp: , Rfl:   •  aspirin (aspirin) 81 MG EC tablet, Take 1 tablet by mouth Daily., Disp: 30 tablet, Rfl: 5  •  azelastine (ASTELIN) 0.1 % nasal spray, 1 spray into the nostril(s) as directed by provider 2 (Two) Times a Day. Use in each nostril as directed, Disp: , Rfl:   •  budesonide-formoterol (SYMBICORT) 160-4.5 MCG/ACT inhaler, Inhale 2 puffs 2 (Two) Times a Day., Disp: , Rfl:   •  buprenorphine-naloxone (SUBOXONE) 8-2 MG per SL tablet, Place 2.25 tablets under the tongue Daily., Disp: , Rfl:   •  colchicine 0.6 MG tablet, Take 0.6 mg by mouth 3 (Three) Times a Day As Needed (gout flare)., Disp: , Rfl:   •  FLUoxetine (PROzac) 20 MG  capsule, Take 20 mg by mouth Daily., Disp: , Rfl:   •  furosemide (LASIX) 40 MG tablet, Take 40 mg by mouth As Needed., Disp: , Rfl:   •  gabapentin (NEURONTIN) 400 MG capsule, Take 600 mg by mouth 3 (Three) Times a Day., Disp: , Rfl:   •  hydrOXYzine (ATARAX) 50 MG tablet, Take 1 tablet by mouth As Needed., Disp: , Rfl:   •  isosorbide mononitrate (IMDUR) 60 MG 24 hr tablet, Take 1.5 tablets by mouth Every Morning., Disp: 90 tablet, Rfl: 3  •  levETIRAcetam (KEPPRA) 750 MG tablet, Take 750 mg by mouth 2 (Two) Times a Day., Disp: , Rfl:   •  meloxicam (MOBIC) 15 MG tablet, Take 15 mg by mouth Daily., Disp: , Rfl:   •  metoprolol succinate XL (TOPROL-XL) 100 MG 24 hr tablet, Take 1 tablet by mouth Daily., Disp: 30 tablet, Rfl: 3  •  multivitamin with minerals tablet tablet, Take 1 tablet by mouth Daily., Disp: , Rfl:   •  niacin (SLO-NIACIN) 500 MG CR tablet, Take 500 mg by mouth Daily., Disp: , Rfl:   •  Nicotine 21-14-7 MG/24HR kit, Place 1 patch on the skin as directed by provider Daily., Disp: 56 each, Rfl: 0  •  nitroglycerin (NITROLINGUAL) 0.4 MG/SPRAY spray, Place 1 spray under the tongue Every 5 (Five) Minutes As Needed for Chest Pain., Disp: , Rfl:   •  O2 (OXYGEN), Inhale 2 L/min Continuous., Disp: , Rfl:   •  omeprazole (priLOSEC) 20 MG capsule, Take 20 mg by mouth Daily., Disp: , Rfl:   •  potassium chloride 10 MEQ CR tablet, Take 10 mEq by mouth Daily., Disp: , Rfl:   •  rivaroxaban (XARELTO) 20 MG tablet, Take 1 tablet by mouth every night at bedtime. AFIB, Disp: 30 tablet, Rfl: 5  •  rosuvastatin (CRESTOR) 10 MG tablet, Take 10 mg by mouth Every Night., Disp: , Rfl:   •  sacubitril-valsartan (Entresto) 24-26 MG tablet, Take 1 tablet by mouth 2 (Two) Times a Day., Disp: 60 tablet, Rfl: 3  •  silver sulfadiazine (SILVADENE, SSD) 1 % cream, Apply 1 application topically to the appropriate area as directed 2 (Two) Times a Day. Applies Buttocks and legs, Disp: , Rfl:   •  tamsulosin (FLOMAX) 0.4 MG capsule  "24 hr capsule, Take 1 capsule by mouth Daily., Disp: , Rfl:   •  dapagliflozin Propanediol (Farxiga) 10 MG tablet, Take 10 mg by mouth Daily., Disp: 30 tablet, Rfl: 3  •  spironolactone (ALDACTONE) 25 MG tablet, Take 1 tablet by mouth Daily., Disp: 30 tablet, Rfl: 3      The following portions of the patient's history were reviewed and updated as appropriate: allergies, current medications, past family history, past medical history, past social history, past surgical history and problem list.    Social History     Tobacco Use   • Smoking status: Every Day     Packs/day: 1.00     Years: 10.00     Pack years: 10.00     Types: Cigarettes   • Tobacco comments:     Still smoking   Vaping Use   • Vaping Use: Unknown   Substance Use Topics   • Alcohol use: Not Currently   • Drug use: Not Currently     Review of Systems   Constitutional: Negative for chills, fever, malaise/fatigue, weight gain and weight loss.   HENT: Negative for congestion and nosebleeds.    Cardiovascular: Negative for chest pain, irregular heartbeat, leg swelling and orthopnea.   Respiratory: Negative for cough and hemoptysis.    Gastrointestinal: Negative for abdominal pain, change in bowel habit, hematemesis, melena and vomiting.   Genitourinary: Negative for dysuria, frequency and hematuria.   Neurological: Negative for focal weakness, headaches, light-headedness and weakness.     Objective    Vitals:    01/12/23 1304   BP: 118/71   Pulse: 81   Weight: (!) 209 kg (460 lb)   Height: 177.8 cm (70\")     Body mass index is 66 kg/m².        Constitutional:       Appearance: Well-developed.      Comments: Morbidly obese  male.  In mild respiratory distress at this time   Eyes:      Conjunctiva/sclera: Conjunctivae normal.   HENT:      Head: Normocephalic.   Neck:      Thyroid: No thyromegaly.      Vascular: No JVD.      Trachea: No tracheal deviation.   Pulmonary:      Effort: No respiratory distress.      Breath sounds: Scattered Wheezing " present. Bilateral Rhonchi present. No rales.   Cardiovascular:      PMI at left midclavicular line. Normal rate. Regular rhythm. Normal S1. Normal S2.      Murmurs: There is no murmur.      No gallop. No click. No rub.   Pulses:     Intact distal pulses.   Edema:     Pretibial: bilateral 2+ edema of the pretibial area.     Ankle: bilateral 2+ edema of the ankle.     Feet: bilateral 2+ edema of the feet.  Abdominal:      General: Bowel sounds are normal.      Palpations: Abdomen is soft. There is no abdominal mass.      Tenderness: There is no abdominal tenderness.   Musculoskeletal:      Cervical back: Normal range of motion and neck supple. Skin:     General: Skin is warm and dry.   Neurological:      Mental Status: Alert, oriented to person, place, and time and oriented to person, place and time.      Cranial Nerves: No cranial nerve deficit.         Lab Results   Component Value Date     01/10/2023    K 4.2 01/10/2023     01/10/2023    CO2 29.0 01/10/2023    BUN 7 01/10/2023    CREATININE 0.55 (L) 01/10/2023    GLUCOSE 110 (H) 01/10/2023    CALCIUM 8.7 01/10/2023    AST 74 (H) 01/10/2023    ALT 30 01/10/2023    ALKPHOS 121 (H) 01/10/2023     Lab Results   Component Value Date    CKTOTAL 61 06/15/2021     Lab Results   Component Value Date    WBC 5.13 01/10/2023    HGB 11.6 (L) 01/10/2023    HCT 37.1 (L) 01/10/2023     01/10/2023     Lab Results   Component Value Date    INR 1.26 (H) 12/18/2022    INR 1.13 (H) 06/17/2021    INR 1.63 (H) 08/15/2017     Lab Results   Component Value Date    MG 1.8 07/27/2022     Lab Results   Component Value Date    TSH 0.559 06/15/2021        Assessment & Plan :   Diagnosis Plan   1. Chronic HFrEF (heart failure with reduced ejection fraction)       2. Longstanding persistent atrial fibrillation       3. Class 3 obesity with alveolar hypoventilation, serious comorbidity, and body mass index (BMI) of 60.0 to 69.9 in adult.           Recommendations:  1. For his  cardiomyopathy, continue with Entresto and metoprolol succinate.  2. We will add spironolactone 25 mg daily and Farxiga 10 mg daily  3. Check BMP in a week in 2 weeks.  4. Will refer to electrophysiologist to see if he would be a candidate for AF ablation.  5. I have reemphasized to him about the importance of sticking to salt restricted diet and the foods to avoid such as canned vegetables, canned soups, all chips,, pickles, processed meats such as sausages, hot dogs, hamburgers and cheeseburgers.  I have asked him to check food labels for sodium content and avoid anything that has more than 200 mg per serving.  6. I have asked him to weigh himself regularly.  7. Will hopefully be able to perform ischemic evaluation when he gets his weight to below 400 pounds.    Return in about 3 months (around 4/12/2023).    As always, Fernando Whitaker APRN  I appreciate very much the opportunity to participate in the cardiovascular care of your patients. Please do not hesitate to call me with any questions with regards to Adriano Henry's evaluation and management.         With Best Regards,        Shashank Mendes MD, [facility]    Please note that portions of this note were completed with a voice recognition program.

## 2023-01-12 NOTE — PROGRESS NOTES
Fernando Whitaker APRN  Adriano Henry  1970 01/12/2023    Patient Active Problem List   Diagnosis   • Cellulitis and abscess of lower extremity   • Hematoma   • Paroxysmal atrial fibrillation (HCC)   • Heart failure with reduced ejection fraction (HCC)   • Atrial fibrillation, persistent (HCC)       Dear Fernando Whitaker APRN:    Subjective     Adriano Henry is a 52 y.o. male with the problems as listed above, presents    Chief complaint: Follow-up of acute on chronic systolic heart failure, persistent atrial fibrillation and mild cardiomyopathy.    History of Present Illness:  is a pleasant 49-year-old  male with history of persistent atrial fibrillation with intermittent rapid ventricle response and mild cardiomyopathy with LV ejection fraction of about 40 to 45% with grade 2 diastolic noncompliance of the left ventricle and moderate mitral regurgitation.  He had attempted cardioversion x2 with successful reversion for statin but then last time it was unsuccessful.  He has baseline prolonged QTC and hence was felt to be not a candidate for any of the class III antiarrhythmic agents.  He is also obviously noted candidate for class Ic antiarrhythmic agents due to structural heart disease and heart failure.  He was recently evaluated in the emergency department at [facility] on 1/10/2023 for complaints of increasing shortness of breath.  He was noted to be in acute decompensated heart failure.  He was given furosemide 80 mg IV and was discharged home and was referred to heart failure clinic.  He is here today as a follow-up of that visit.      On today's visit he states his breathing is better now than it was 2 days ago.  He does not weigh himself at home.  His weight here showing a relatively stable weight around 460 pounds.  Patient did not have any ischemic evaluation in the past due to his weight of more than 50 pounds in not a candidate to be placed on the table safely in  the nuclear lab or cardiac Cath Lab.  He does not have known myocardial infarction.  He denies any chest pain or discomfort.  He is currently on metoprolol succinate 100 mg daily and Entresto 24/26 mg p.o. twice daily.  States he takes his medications regularly.  He is not very meticulous about following a salt restricted diet.  Still smoking about a pack in 3 days.      No Known Allergies:      Current Outpatient Medications:   •  albuterol (PROVENTIL HFA;VENTOLIN HFA) 108 (90 Base) MCG/ACT inhaler, Inhale 2 puffs Every 4 (Four) Hours As Needed for Shortness of Air., Disp: , Rfl:   •  allopurinol (ZYLOPRIM) 300 MG tablet, Take 300 mg by mouth Daily., Disp: , Rfl:   •  ARIPiprazole (ABILIFY) 10 MG tablet, Take 10 mg by mouth Daily., Disp: , Rfl:   •  aspirin (aspirin) 81 MG EC tablet, Take 1 tablet by mouth Daily., Disp: 30 tablet, Rfl: 5  •  azelastine (ASTELIN) 0.1 % nasal spray, 1 spray into the nostril(s) as directed by provider 2 (Two) Times a Day. Use in each nostril as directed, Disp: , Rfl:   •  budesonide-formoterol (SYMBICORT) 160-4.5 MCG/ACT inhaler, Inhale 2 puffs 2 (Two) Times a Day., Disp: , Rfl:   •  buprenorphine-naloxone (SUBOXONE) 8-2 MG per SL tablet, Place 2.25 tablets under the tongue Daily., Disp: , Rfl:   •  colchicine 0.6 MG tablet, Take 0.6 mg by mouth 3 (Three) Times a Day As Needed (gout flare)., Disp: , Rfl:   •  FLUoxetine (PROzac) 20 MG capsule, Take 20 mg by mouth Daily., Disp: , Rfl:   •  furosemide (LASIX) 40 MG tablet, Take 40 mg by mouth As Needed., Disp: , Rfl:   •  gabapentin (NEURONTIN) 400 MG capsule, Take 600 mg by mouth 3 (Three) Times a Day., Disp: , Rfl:   •  hydrOXYzine (ATARAX) 50 MG tablet, Take 1 tablet by mouth As Needed., Disp: , Rfl:   •  isosorbide mononitrate (IMDUR) 60 MG 24 hr tablet, Take 1.5 tablets by mouth Every Morning., Disp: 90 tablet, Rfl: 3  •  levETIRAcetam (KEPPRA) 750 MG tablet, Take 750 mg by mouth 2 (Two) Times a Day., Disp: , Rfl:   •  meloxicam  (MOBIC) 15 MG tablet, Take 15 mg by mouth Daily., Disp: , Rfl:   •  metoprolol succinate XL (TOPROL-XL) 100 MG 24 hr tablet, Take 1 tablet by mouth Daily., Disp: 30 tablet, Rfl: 3  •  multivitamin with minerals tablet tablet, Take 1 tablet by mouth Daily., Disp: , Rfl:   •  niacin (SLO-NIACIN) 500 MG CR tablet, Take 500 mg by mouth Daily., Disp: , Rfl:   •  Nicotine 21-14-7 MG/24HR kit, Place 1 patch on the skin as directed by provider Daily., Disp: 56 each, Rfl: 0  •  nitroglycerin (NITROLINGUAL) 0.4 MG/SPRAY spray, Place 1 spray under the tongue Every 5 (Five) Minutes As Needed for Chest Pain., Disp: , Rfl:   •  O2 (OXYGEN), Inhale 2 L/min Continuous., Disp: , Rfl:   •  omeprazole (priLOSEC) 20 MG capsule, Take 20 mg by mouth Daily., Disp: , Rfl:   •  potassium chloride 10 MEQ CR tablet, Take 10 mEq by mouth Daily., Disp: , Rfl:   •  rivaroxaban (XARELTO) 20 MG tablet, Take 1 tablet by mouth every night at bedtime. AFIB, Disp: 30 tablet, Rfl: 5  •  rosuvastatin (CRESTOR) 10 MG tablet, Take 10 mg by mouth Every Night., Disp: , Rfl:   •  sacubitril-valsartan (Entresto) 24-26 MG tablet, Take 1 tablet by mouth 2 (Two) Times a Day., Disp: 60 tablet, Rfl: 3  •  silver sulfadiazine (SILVADENE, SSD) 1 % cream, Apply 1 application topically to the appropriate area as directed 2 (Two) Times a Day. Applies Buttocks and legs, Disp: , Rfl:   •  tamsulosin (FLOMAX) 0.4 MG capsule 24 hr capsule, Take 1 capsule by mouth Daily., Disp: , Rfl:   •  dapagliflozin Propanediol (Farxiga) 10 MG tablet, Take 10 mg by mouth Daily., Disp: 30 tablet, Rfl: 3  •  spironolactone (ALDACTONE) 25 MG tablet, Take 1 tablet by mouth Daily., Disp: 30 tablet, Rfl: 3      The following portions of the patient's history were reviewed and updated as appropriate: allergies, current medications, past family history, past medical history, past social history, past surgical history and problem list.    Social History     Tobacco Use   • Smoking status: Every  "Day     Packs/day: 1.00     Years: 10.00     Pack years: 10.00     Types: Cigarettes   • Tobacco comments:     Still smoking   Vaping Use   • Vaping Use: Unknown   Substance Use Topics   • Alcohol use: Not Currently   • Drug use: Not Currently     Review of Systems   Constitutional: Negative for chills, fever, malaise/fatigue, weight gain and weight loss.   HENT: Negative for congestion and nosebleeds.    Cardiovascular: Negative for chest pain, irregular heartbeat, leg swelling and orthopnea.   Respiratory: Negative for cough and hemoptysis.    Gastrointestinal: Negative for abdominal pain, change in bowel habit, hematemesis, melena and vomiting.   Genitourinary: Negative for dysuria, frequency and hematuria.   Neurological: Negative for focal weakness, headaches, light-headedness and weakness.     Objective   Vitals:    01/12/23 1304   BP: 118/71   Pulse: 81   Weight: (!) 209 kg (460 lb)   Height: 177.8 cm (70\")     Body mass index is 66 kg/m².        Constitutional:       Appearance: Well-developed.      Comments: Morbidly obese  male.  In mild respiratory distress at this time   Eyes:      Conjunctiva/sclera: Conjunctivae normal.   HENT:      Head: Normocephalic.   Neck:      Thyroid: No thyromegaly.      Vascular: No JVD.      Trachea: No tracheal deviation.   Pulmonary:      Effort: No respiratory distress.      Breath sounds: Scattered Wheezing present. Bilateral Rhonchi present. No rales.   Cardiovascular:      PMI at left midclavicular line. Normal rate. Regular rhythm. Normal S1. Normal S2.      Murmurs: There is no murmur.      No gallop. No click. No rub.   Pulses:     Intact distal pulses.   Edema:     Pretibial: bilateral 2+ edema of the pretibial area.     Ankle: bilateral 2+ edema of the ankle.     Feet: bilateral 2+ edema of the feet.  Abdominal:      General: Bowel sounds are normal.      Palpations: Abdomen is soft. There is no abdominal mass.      Tenderness: There is no abdominal " tenderness.   Musculoskeletal:      Cervical back: Normal range of motion and neck supple. Skin:     General: Skin is warm and dry.   Neurological:      Mental Status: Alert, oriented to person, place, and time and oriented to person, place and time.      Cranial Nerves: No cranial nerve deficit.         Lab Results   Component Value Date     01/10/2023    K 4.2 01/10/2023     01/10/2023    CO2 29.0 01/10/2023    BUN 7 01/10/2023    CREATININE 0.55 (L) 01/10/2023    GLUCOSE 110 (H) 01/10/2023    CALCIUM 8.7 01/10/2023    AST 74 (H) 01/10/2023    ALT 30 01/10/2023    ALKPHOS 121 (H) 01/10/2023     Lab Results   Component Value Date    CKTOTAL 61 06/15/2021     Lab Results   Component Value Date    WBC 5.13 01/10/2023    HGB 11.6 (L) 01/10/2023    HCT 37.1 (L) 01/10/2023     01/10/2023     Lab Results   Component Value Date    INR 1.26 (H) 12/18/2022    INR 1.13 (H) 06/17/2021    INR 1.63 (H) 08/15/2017     Lab Results   Component Value Date    MG 1.8 07/27/2022     Lab Results   Component Value Date    TSH 0.559 06/15/2021        Assessment & Plan :   Diagnosis Plan   1. Chronic HFrEF (heart failure with reduced ejection fraction)       2. Longstanding persistent atrial fibrillation       3. Class 3 obesity with alveolar hypoventilation, serious comorbidity, and body mass index (BMI) of 60.0 to 69.9 in adult.            Recommendations:  1. For his cardiomyopathy, continue with Entresto and metoprolol succinate.  2. We will add spironolactone 25 mg daily and Farxiga 10 mg daily  3. Check BMP in a week in 2 weeks.  4. Will refer to electrophysiologist to see if he would be a candidate for AF ablation.  5. I have reemphasized to him about the importance of sticking to salt restricted diet and the foods to avoid such as canned vegetables, canned soups, all chips,, pickles, processed meats such as sausages, hot dogs, hamburgers and cheeseburgers.  I have asked him to check food labels for sodium  content and avoid anything that has more than 200 mg per serving.  6. I have asked him to weigh himself regularly.  7. Will hopefully be able to perform ischemic evaluation when he gets his weight to below 400 pounds.    Return in about 3 months (around 4/12/2023).    As always, Fernando Whitaker APRN  I appreciate very much the opportunity to participate in the cardiovascular care of your patients. Please do not hesitate to call me with any questions with regards to Adriano Henry's evaluation and management.         With Best Regards,        Shashank Mendes MD, [facility]    Please note that portions of this note were completed with a voice recognition program.

## 2023-01-15 LAB
BACTERIA SPEC AEROBE CULT: NORMAL
BACTERIA SPEC AEROBE CULT: NORMAL

## 2023-02-15 PROBLEM — K21.9 GERD (GASTROESOPHAGEAL REFLUX DISEASE): Status: ACTIVE | Noted: 2023-02-15

## 2023-02-15 PROBLEM — I10 PRIMARY HYPERTENSION: Status: ACTIVE | Noted: 2023-02-15

## 2023-02-15 PROBLEM — T14.8XXA HEMATOMA: Status: RESOLVED | Noted: 2017-08-29 | Resolved: 2023-02-15

## 2023-02-15 PROBLEM — Z79.01 CHRONIC ANTICOAGULATION: Status: ACTIVE | Noted: 2023-02-15

## 2023-02-15 PROBLEM — G47.33 OSA (OBSTRUCTIVE SLEEP APNEA): Status: ACTIVE | Noted: 2023-02-15

## 2023-02-15 PROBLEM — I49.5 TACHYCARDIA-BRADYCARDIA SYNDROME: Status: ACTIVE | Noted: 2023-02-15

## 2023-02-15 PROBLEM — Z72.0 TOBACCO ABUSE: Status: ACTIVE | Noted: 2023-02-15

## 2023-02-15 PROBLEM — E66.01 CLASS 3 SEVERE OBESITY WITHOUT SERIOUS COMORBIDITY IN ADULT: Status: ACTIVE | Noted: 2023-02-15

## 2023-02-15 PROBLEM — E78.5 HYPERLIPIDEMIA: Status: ACTIVE | Noted: 2023-02-15

## 2023-02-15 PROBLEM — N40.0 BPH (BENIGN PROSTATIC HYPERPLASIA): Status: ACTIVE | Noted: 2023-02-15

## 2023-02-15 PROBLEM — F32.9 MAJOR DEPRESSIVE DISORDER: Status: ACTIVE | Noted: 2023-02-15

## 2023-02-15 PROBLEM — L02.419 CELLULITIS AND ABSCESS OF LOWER EXTREMITY: Status: RESOLVED | Noted: 2017-08-15 | Resolved: 2023-02-15

## 2023-02-15 PROBLEM — L03.119 CELLULITIS AND ABSCESS OF LOWER EXTREMITY: Status: RESOLVED | Noted: 2017-08-15 | Resolved: 2023-02-15

## 2023-02-17 ENCOUNTER — OFFICE VISIT (OUTPATIENT)
Dept: CARDIOLOGY | Facility: CLINIC | Age: 53
End: 2023-02-17
Payer: MEDICARE

## 2023-02-17 VITALS
DIASTOLIC BLOOD PRESSURE: 70 MMHG | BODY MASS INDEX: 45.1 KG/M2 | WEIGHT: 315 LBS | HEIGHT: 70 IN | HEART RATE: 95 BPM | SYSTOLIC BLOOD PRESSURE: 124 MMHG | OXYGEN SATURATION: 100 %

## 2023-02-17 DIAGNOSIS — I48.0 PAROXYSMAL ATRIAL FIBRILLATION: Primary | ICD-10-CM

## 2023-02-17 DIAGNOSIS — I49.5 TACHYCARDIA-BRADYCARDIA SYNDROME: ICD-10-CM

## 2023-02-17 PROCEDURE — 99214 OFFICE O/P EST MOD 30 MIN: CPT | Performed by: STUDENT IN AN ORGANIZED HEALTH CARE EDUCATION/TRAINING PROGRAM

## 2023-02-17 RX ORDER — PROPRANOLOL HYDROCHLORIDE 40 MG/1
TABLET ORAL
COMMUNITY
Start: 2023-02-14

## 2023-02-17 RX ORDER — SOTALOL HYDROCHLORIDE 80 MG/1
1 TABLET ORAL EVERY 12 HOURS SCHEDULED
COMMUNITY
Start: 2023-01-20

## 2023-02-17 NOTE — PROGRESS NOTES
Cardiac Electrophysiology Outpatient Note  [facility]    Office Visit     Adriano Henry  [id number]  11/01/2022    Primary Care Physician: Fernando Whitaker APRN    Referred By: No ref. provider found    CC: Persistent atrial fibrillation, tachybradycardia    Problem List:  1. Tachybradycardia syndrome  2. Persistent atrial fibrillation  a. [id number]=  b. ECV 06/18/2021  c. Attempted Tikosyn initiation, canceled due to prolonged QTC 7/2022  d. Failed ECV 7/26/2022  e. Event monitor  3. HFrEF, ischemic cardiomyopathy  a. Stress test 6/15/2021: Medium sized infarct located in the inferior wall and apex with no significant ischemia noted, abnormal LV wall motion consistent with dyskinesis of the inferior wall and LV apex.  EF 41%.  Intermediate risk study.  b. Echo 6/15/2021: EF 41 to 45%, the following LV wall segments are hypokinetic: Apical anterior, apical lateral, apical inferior, apical septal and apex.  LA moderately dilated, trace AR.  Moderate posterior mitral leaflet thickening, moderate mitral regurgitation.  4. Diastolic dysfunction  5. Morbid obesity, BMI 63  6. Sleep apnea, noncompliant with CPAP  7. Tobacco abuse    History of Present Illness:   Adriano Henry is a 52 y.o. male who presents to the electrophysiology clinic for follow-up regarding persistent atrial fibrillation.   He has had very persistent atrial fibrillation.  He is undergone attempted cardioversion and Tikosyn but this was unsuccessful.  He has morbid obesity with a BMI of 63.  He also has a cardiomyopathy with ejection fraction around 40%.  He also had an ambulatory monitor with a 4-second pause.  Unsure if there were any symptoms related to this.    Since we last saw him he is overall no change in his symptoms.  He gets occasional chest pain, which he describes as a sharp pinch.  He does have dyspnea all the time.  He has chronic oxygen requirements is on 2 L continuously.  Does not  have any palpitations.        Past Surgical History:   Procedure Laterality Date   • ABDOMINAL SURGERY     • AORTIC VALVE REPAIR/REPLACEMENT      Needing pace maker   • CARDIAC CATHETERIZATION     • CAROTID STENT     • CHOLECYSTECTOMY     • GASTRIC BYPASS     • ANAND FILTER INSERTION JUGULAR     • INCISION AND DRAINAGE LEG Right 08/15/2017    Procedure: INCISION AND DRAINAGE ABSCESS;  Surgeon: Diana Sutherland MD;  Location:  [facility] OR;  Service:    • KNEE SURGERY     • WOUND EXPLORATION LEG Right 08/30/2017    Procedure: Exploration right leg wound;  Surgeon: Leo Ragland MD;  Location:  [facility] OR;  Service:        Family History   Problem Relation Age of Onset   • Hypertension Mother    • Hypertension Father    • Anemia Father    • Arrhythmia Father    • Asthma Father    • Clotting disorder Father    • Fainting Father    • Heart attack Father    • Heart disease Father    • Heart failure Father        Social History     Socioeconomic History   • Marital status:    Tobacco Use   • Smoking status: Every Day     Packs/day: 1.00     Years: 10.00     Pack years: 10.00     Types: Cigarettes   • Tobacco comments:     Still smoking   Vaping Use   • Vaping Use: Unknown   Substance and Sexual Activity   • Alcohol use: Not Currently   • Drug use: Not Currently   • Sexual activity: Not Currently     Partners: Female         Current Outpatient Medications:   •  albuterol (PROVENTIL HFA;VENTOLIN HFA) 108 (90 Base) MCG/ACT inhaler, Inhale 2 puffs Every 4 (Four) Hours As Needed for Shortness of Air., Disp: , Rfl:   •  allopurinol (ZYLOPRIM) 300 MG tablet, Take 300 mg by mouth Daily., Disp: , Rfl:   •  ARIPiprazole (ABILIFY) 10 MG tablet, Take 10 mg by mouth Daily., Disp: , Rfl:   •  aspirin (aspirin) 81 MG EC tablet, Take 1 tablet by mouth Daily., Disp: 30 tablet, Rfl: 5  •  azelastine (ASTELIN) 0.1 % nasal spray, 1 spray into the nostril(s) as directed by provider 2 (Two) Times a Day. Use in each nostril as  directed, Disp: , Rfl:   •  budesonide-formoterol (SYMBICORT) 160-4.5 MCG/ACT inhaler, Inhale 2 puffs 2 (Two) Times a Day., Disp: , Rfl:   •  buprenorphine-naloxone (SUBOXONE) 8-2 MG per SL tablet, Place 2.25 tablets under the tongue Daily., Disp: , Rfl:   •  colchicine 0.6 MG tablet, Take 0.6 mg by mouth 3 (Three) Times a Day As Needed (gout flare)., Disp: , Rfl:   •  FLUoxetine (PROzac) 20 MG capsule, Take 20 mg by mouth Daily., Disp: , Rfl:   •  furosemide (LASIX) 40 MG tablet, Take 40 mg by mouth As Needed., Disp: , Rfl:   •  gabapentin (NEURONTIN) 400 MG capsule, Take 600 mg by mouth 3 (Three) Times a Day., Disp: , Rfl:   •  hydrOXYzine (ATARAX) 50 MG tablet, Take 1 tablet by mouth As Needed., Disp: , Rfl:   •  isosorbide mononitrate (IMDUR) 60 MG 24 hr tablet, Take 1.5 tablets by mouth Every Morning., Disp: 90 tablet, Rfl: 3  •  levETIRAcetam (KEPPRA) 750 MG tablet, Take 750 mg by mouth 2 (Two) Times a Day., Disp: , Rfl:   •  meloxicam (MOBIC) 15 MG tablet, Take 15 mg by mouth Daily., Disp: , Rfl:   •  metoprolol succinate XL (TOPROL-XL) 100 MG 24 hr tablet, Take 1 tablet by mouth Daily., Disp: 30 tablet, Rfl: 3  •  multivitamin with minerals tablet tablet, Take 1 tablet by mouth Daily., Disp: , Rfl:   •  niacin (SLO-NIACIN) 500 MG CR tablet, Take 500 mg by mouth Daily., Disp: , Rfl:   •  O2 (OXYGEN), Inhale 2 L/min Continuous., Disp: , Rfl:   •  omeprazole (priLOSEC) 20 MG capsule, Take 20 mg by mouth Daily., Disp: , Rfl:   •  potassium chloride 10 MEQ CR tablet, Take 10 mEq by mouth Daily., Disp: , Rfl:   •  rivaroxaban (XARELTO) 20 MG tablet, Take 1 tablet by mouth every night at bedtime. AFIB, Disp: 30 tablet, Rfl: 5  •  rosuvastatin (CRESTOR) 10 MG tablet, Take 10 mg by mouth Every Night., Disp: , Rfl:   •  sacubitril-valsartan (Entresto) 24-26 MG tablet, Take 1 tablet by mouth 2 (Two) Times a Day., Disp: 60 tablet, Rfl: 3  •  silver sulfadiazine (SILVADENE, SSD) 1 % cream, Apply 1 application topically  "to the appropriate area as directed 2 (Two) Times a Day. Applies Buttocks and legs, Disp: , Rfl:   •  sotalol (BETAPACE) 80 MG tablet, Take 1 tablet by mouth Every 12 (Twelve) Hours., Disp: , Rfl:   •  spironolactone (ALDACTONE) 25 MG tablet, Take 1 tablet by mouth Daily., Disp: 30 tablet, Rfl: 3  •  tamsulosin (FLOMAX) 0.4 MG capsule 24 hr capsule, Take 1 capsule by mouth Daily., Disp: , Rfl:   •  dapagliflozin Propanediol (Farxiga) 10 MG tablet, Take 10 mg by mouth Daily., Disp: 30 tablet, Rfl: 3  •  metFORMIN (GLUCOPHAGE) 500 MG tablet, Daily., Disp: , Rfl:   •  propranolol (INDERAL) 40 MG tablet, , Disp: , Rfl:     Allergies:   No Known Allergies  Vital Signs: Blood pressure 124/70, pulse 95, height 177.8 cm (70\"), weight (!) 209 kg (460 lb), SpO2 100 %.    Physical Exam  General appearance: Awake, alert, cooperative, morbid obesity  Head: Normocephalic, without obvious abnormality, atraumatic  Neck: No JVD  Lungs: Clear to ascultation bilaterally  Heart: Regular rate and rhythm, no murmurs, 2+ LE pulses, 2+ bilateral lower extremity swelling  Skin: Skin color, turgor normal, no rashes or lesions  Neurologic: Grossly normal     Lab Results   Component Value Date    GLUCOSE 110 (H) 01/10/2023    CALCIUM 8.7 01/10/2023     01/10/2023    K 4.2 01/10/2023    CO2 29.0 01/10/2023     01/10/2023    BUN 7 01/10/2023    CREATININE 0.55 (L) 01/10/2023    EGFRIFNONA >150 06/19/2021    BCR 12.7 01/10/2023    ANIONGAP 7.0 01/10/2023     Lab Results   Component Value Date    WBC 5.13 01/10/2023    HGB 11.6 (L) 01/10/2023    HCT 37.1 (L) 01/10/2023    MCV 97.4 (H) 01/10/2023     01/10/2023     Lab Results   Component Value Date    INR 1.26 (H) 12/18/2022    INR 1.13 (H) 06/17/2021    INR 1.63 (H) 08/15/2017    PROTIME 16.1 (H) 12/18/2022    PROTIME 14.9 (H) 06/17/2021    PROTIME 19.6 (H) 08/15/2017     Lab Results   Component Value Date    TSH 0.559 06/15/2021        Results for orders placed during the " hospital encounter of 06/15/21    Adult Transthoracic Echo Complete W/ Cont if Necessary Per Protocol    Interpretation Summary  · Normal left ventricular cavity size and wall thickness noted  · The following left ventricular wall segments are hypokinetic: apical anterior, apical lateral, apical inferior, apical septal and apex hypokinetic.  · Left ventricular ejection fraction appears to be 41 - 45%. Left ventricular systolic function is moderately decreased.  · Left ventricular diastolic function is consistent with (grade II w/high LAP) pseudonormalization.  · The left atrial cavity is moderately dilated. Left atrial volume is moderately increased.  · The aortic valve is not well visualized. The aortic valve is grossly normal in structure. Trace aortic valve regurgitation is present. No aortic valve stenosis is present.  · There is moderate, posterior mitral leaflet thickening present. Moderate mitral valve regurgitation is present. No significant mitral valve stenosis is present.  · There is no evidence of pericardial effusion.      I personally viewed and interpreted the patient's EKG/Telemetry/lab data.    Procedures             Adriano Henry  reports that he has been smoking cigarettes. He has a 10.00 pack-year smoking history. He does not have any smokeless tobacco history on file..    Assessment & Plan    1. Longstanding persistent atrial fibrillation (HCC)  Patient has a history of longstanding persistent atrial fibrillation.  Due to his very severe morbid obesity I do not think any attempts at rhythm control are warranted.  We did discuss that the most important thing in regard to the atrial fibrillation prevention of stroke.  He is currently taking his Xarelto.  I think this is reasonable to continue.  It is important to note that that there are basically no studies confirming the efficacy of medicines like Xarelto and people his weight.    2. Bradycardia  He did wear an ambulatory monitor showing a  4-second pause.  At the current time he does not have any symptoms and does not require pacemaker at the current time. We discussed potential symptoms to watch for, most notably syncopal episodes without warning, and they will keep an eye out for these.    If he needed a pacemaker some our options are quite limited.  It is important to note when considering the pros and cons of this, they given his morbid obesity the risk of adverse outcomes is significantly increased.  Typically in a patient with permanent atrial fibrillation and leadless pacemaker would be a good option.  Unfortunately I am not sure this will be something that is feasible on him.  I think that femoral vascular access will be quite difficult given his size.  He also has an IVC filter which would make placement of a leadless pacemaker difficult.  We could consider doing a traditional transvenous pacemaker.  Again given his side the risk of infection, bleeding, pneumothorax are significantly elevated.  We discussed that we would only proceed with this if he is having clear symptoms that we feel like we could definitely improve with pacing.    3. Class 3 obesity with alveolar hypoventilation, serious comorbidity, and body mass index (BMI) of 60.0 to 69.9 in adult (HCC)  As above he has a BMI of around 66.  This significantly affects multiple organ systems.       Follow Up:  No follow-ups on file.        Solitario Still MD  11/01/2022

## 2023-05-08 RX ORDER — DAPAGLIFLOZIN 10 MG/1
TABLET, FILM COATED ORAL
Qty: 30 TABLET | Refills: 3 | Status: SHIPPED | OUTPATIENT
Start: 2023-05-08

## 2023-09-11 ENCOUNTER — APPOINTMENT (OUTPATIENT)
Dept: GENERAL RADIOLOGY | Facility: HOSPITAL | Age: 53
End: 2023-09-11
Payer: MEDICARE

## 2023-09-11 ENCOUNTER — HOSPITAL ENCOUNTER (EMERGENCY)
Facility: HOSPITAL | Age: 53
Discharge: HOME | End: 2023-09-11
Attending: STUDENT IN AN ORGANIZED HEALTH CARE EDUCATION/TRAINING PROGRAM | Admitting: STUDENT IN AN ORGANIZED HEALTH CARE EDUCATION/TRAINING PROGRAM
Payer: MEDICARE

## 2023-09-11 VITALS
DIASTOLIC BLOOD PRESSURE: 78 MMHG | TEMPERATURE: 98.2 F | SYSTOLIC BLOOD PRESSURE: 110 MMHG | BODY MASS INDEX: 46.65 KG/M2 | HEART RATE: 72 BPM | WEIGHT: 315 LBS | HEIGHT: 69 IN | RESPIRATION RATE: 18 BRPM | OXYGEN SATURATION: 99 %

## 2023-09-11 DIAGNOSIS — Z99.81 OXYGEN DEPENDENT: ICD-10-CM

## 2023-09-11 DIAGNOSIS — Z79.01 CHRONIC ANTICOAGULATION: ICD-10-CM

## 2023-09-11 DIAGNOSIS — I48.91 ATRIAL FIBRILLATION, UNSPECIFIED TYPE: Primary | ICD-10-CM

## 2023-09-11 DIAGNOSIS — E66.2 OBESITY HYPOVENTILATION SYNDROME: ICD-10-CM

## 2023-09-11 LAB
ALBUMIN SERPL-MCNC: 3.2 G/DL (ref 3.5–5.2)
ALBUMIN/GLOB SERPL: 0.8 G/DL
ALP SERPL-CCNC: 94 U/L (ref 39–117)
ALT SERPL W P-5'-P-CCNC: 36 U/L (ref 1–41)
ANION GAP SERPL CALCULATED.3IONS-SCNC: 8.8 MMOL/L (ref 5–15)
AST SERPL-CCNC: 48 U/L (ref 1–40)
BASOPHILS # BLD AUTO: 0.03 10*3/MM3 (ref 0–0.2)
BASOPHILS NFR BLD AUTO: 0.4 % (ref 0–1.5)
BILIRUB SERPL-MCNC: 0.3 MG/DL (ref 0–1.2)
BUN SERPL-MCNC: 13 MG/DL (ref 6–20)
BUN/CREAT SERPL: 18.8 (ref 7–25)
CALCIUM SPEC-SCNC: 9.2 MG/DL (ref 8.6–10.5)
CHLORIDE SERPL-SCNC: 104 MMOL/L (ref 98–107)
CO2 SERPL-SCNC: 26.2 MMOL/L (ref 22–29)
CREAT SERPL-MCNC: 0.69 MG/DL (ref 0.76–1.27)
DEPRECATED RDW RBC AUTO: 61.3 FL (ref 37–54)
EGFRCR SERPLBLD CKD-EPI 2021: 110.7 ML/MIN/1.73
EOSINOPHIL # BLD AUTO: 0.14 10*3/MM3 (ref 0–0.4)
EOSINOPHIL NFR BLD AUTO: 1.9 % (ref 0.3–6.2)
ERYTHROCYTE [DISTWIDTH] IN BLOOD BY AUTOMATED COUNT: 16.1 % (ref 12.3–15.4)
GEN 5 2HR TROPONIN T REFLEX: 15 NG/L
GLOBULIN UR ELPH-MCNC: 4.1 GM/DL
GLUCOSE SERPL-MCNC: 97 MG/DL (ref 65–99)
HCT VFR BLD AUTO: 41.4 % (ref 37.5–51)
HGB BLD-MCNC: 12.9 G/DL (ref 13–17.7)
HOLD SPECIMEN: NORMAL
HOLD SPECIMEN: NORMAL
IMM GRANULOCYTES # BLD AUTO: 0.03 10*3/MM3 (ref 0–0.05)
IMM GRANULOCYTES NFR BLD AUTO: 0.4 % (ref 0–0.5)
INR PPP: 1.03 (ref 0.9–1.1)
LYMPHOCYTES # BLD AUTO: 0.63 10*3/MM3 (ref 0.7–3.1)
LYMPHOCYTES NFR BLD AUTO: 8.7 % (ref 19.6–45.3)
MCH RBC QN AUTO: 31.8 PG (ref 26.6–33)
MCHC RBC AUTO-ENTMCNC: 31.2 G/DL (ref 31.5–35.7)
MCV RBC AUTO: 102 FL (ref 79–97)
MONOCYTES # BLD AUTO: 0.42 10*3/MM3 (ref 0.1–0.9)
MONOCYTES NFR BLD AUTO: 5.8 % (ref 5–12)
NEUTROPHILS NFR BLD AUTO: 6 10*3/MM3 (ref 1.7–7)
NEUTROPHILS NFR BLD AUTO: 82.8 % (ref 42.7–76)
NRBC BLD AUTO-RTO: 0 /100 WBC (ref 0–0.2)
PLATELET # BLD AUTO: 215 10*3/MM3 (ref 140–450)
PMV BLD AUTO: 10.1 FL (ref 6–12)
POTASSIUM SERPL-SCNC: 4.3 MMOL/L (ref 3.5–5.2)
PROT SERPL-MCNC: 7.3 G/DL (ref 6–8.5)
PROTHROMBIN TIME: 14 SECONDS (ref 12.1–14.7)
RBC # BLD AUTO: 4.06 10*6/MM3 (ref 4.14–5.8)
SODIUM SERPL-SCNC: 139 MMOL/L (ref 136–145)
TROPONIN T DELTA: 3 NG/L
TROPONIN T SERPL HS-MCNC: 12 NG/L
WBC NRBC COR # BLD: 7.25 10*3/MM3 (ref 3.4–10.8)
WHOLE BLOOD HOLD COAG: NORMAL
WHOLE BLOOD HOLD SPECIMEN: NORMAL

## 2023-09-11 PROCEDURE — 71045 X-RAY EXAM CHEST 1 VIEW: CPT

## 2023-09-11 PROCEDURE — 85025 COMPLETE CBC W/AUTO DIFF WBC: CPT | Performed by: STUDENT IN AN ORGANIZED HEALTH CARE EDUCATION/TRAINING PROGRAM

## 2023-09-11 PROCEDURE — 93005 ELECTROCARDIOGRAM TRACING: CPT | Performed by: STUDENT IN AN ORGANIZED HEALTH CARE EDUCATION/TRAINING PROGRAM

## 2023-09-11 PROCEDURE — 36415 COLL VENOUS BLD VENIPUNCTURE: CPT

## 2023-09-11 PROCEDURE — 80053 COMPREHEN METABOLIC PANEL: CPT | Performed by: STUDENT IN AN ORGANIZED HEALTH CARE EDUCATION/TRAINING PROGRAM

## 2023-09-11 PROCEDURE — 85610 PROTHROMBIN TIME: CPT | Performed by: STUDENT IN AN ORGANIZED HEALTH CARE EDUCATION/TRAINING PROGRAM

## 2023-09-11 PROCEDURE — 99284 EMERGENCY DEPT VISIT MOD MDM: CPT

## 2023-09-11 PROCEDURE — 71045 X-RAY EXAM CHEST 1 VIEW: CPT | Performed by: RADIOLOGY

## 2023-09-11 PROCEDURE — 84484 ASSAY OF TROPONIN QUANT: CPT | Performed by: STUDENT IN AN ORGANIZED HEALTH CARE EDUCATION/TRAINING PROGRAM

## 2023-09-11 PROCEDURE — 93010 ELECTROCARDIOGRAM REPORT: CPT | Performed by: SPECIALIST

## 2023-09-11 RX ORDER — ASPIRIN 81 MG/1
324 TABLET, CHEWABLE ORAL ONCE
Status: COMPLETED | OUTPATIENT
Start: 2023-09-11 | End: 2023-09-11

## 2023-09-11 RX ORDER — SODIUM CHLORIDE 0.9 % (FLUSH) 0.9 %
10 SYRINGE (ML) INJECTION AS NEEDED
Status: DISCONTINUED | OUTPATIENT
Start: 2023-09-11 | End: 2023-09-11 | Stop reason: HOSPADM

## 2023-09-11 RX ADMIN — ASPIRIN 324 MG: 81 TABLET, CHEWABLE ORAL at 12:55

## 2023-09-11 NOTE — ED PROVIDER NOTES
Subjective   History of Present Illness  52-year-old male presents with complaints of chest pain.  Pt has an extensive cardiovascular history including atrial fibrillation anticoagulated on Xarelto.  Patient is also on chronic medication for hypertension, hyperlipidemia, CHF and COPD on as needed 2 L of nasal cannula oxygen.  Reports that he started having chest pain and felt like his heart was racing that started within the last 2 hrs.  Patient states he takes his chronic medication as prescribed.    Review of Systems    Past Medical History:   Diagnosis Date    Abnormal ECG     Aneurysm     Arrhythmia     Arthritis     Asthma     Atrial fibrillation     CHF (congestive heart failure)     Congenital heart disease     COPD (chronic obstructive pulmonary disease)     Coronary artery disease     Diabetes mellitus     Heart failure with reduced ejection fraction 07/21/2021    Hyperlipidemia     Hypertension     Myocardial infarction     Oxygen dependent     AS NEEDED AT BEDTIME 2L    Seizures     Sleep apnea        No Known Allergies    Past Surgical History:   Procedure Laterality Date    ABDOMINAL SURGERY      AORTIC VALVE REPAIR/REPLACEMENT      Needing pace maker    CARDIAC CATHETERIZATION      CAROTID STENT      CHOLECYSTECTOMY      GASTRIC BYPASS      ANAND FILTER INSERTION JUGULAR      INCISION AND DRAINAGE LEG Right 08/15/2017    Procedure: INCISION AND DRAINAGE ABSCESS;  Surgeon: Diana Sutherland MD;  Location: [facility] OR;  Service:     KNEE SURGERY      WOUND EXPLORATION LEG Right 08/30/2017    Procedure: Exploration right leg wound;  Surgeon: Leo Ragland MD;  Location: [facility] OR;  Service:        Family History   Problem Relation Age of Onset    Hypertension Mother     Hypertension Father     Anemia Father     Arrhythmia Father     Asthma Father     Clotting disorder Father     Fainting Father     Heart attack Father     Heart disease Father     Heart failure Father        Social History      Socioeconomic History    Marital status:    Tobacco Use    Smoking status: Former     Packs/day: 1.00     Years: 10.00     Pack years: 10.00     Types: Cigarettes    Tobacco comments:     Still smoking   Vaping Use    Vaping Use: Never used   Substance and Sexual Activity    Alcohol use: Not Currently    Drug use: Not Currently    Sexual activity: Not Currently     Partners: Female           Objective   Physical Exam  Vitals and nursing note reviewed.   Constitutional:       General: He is not in acute distress.     Appearance: He is well-developed. He is obese. He is not diaphoretic.      Comments: Morbidly obese chronically ill-appearing gentleman in no acute respiratory distress.     HENT:      Head: Normocephalic and atraumatic.      Right Ear: External ear normal.      Left Ear: External ear normal.      Nose: Nose normal.   Eyes:      Conjunctiva/sclera: Conjunctivae normal.      Pupils: Pupils are equal, round, and reactive to light.   Neck:      Vascular: No JVD.      Trachea: No tracheal deviation.   Cardiovascular:      Rate and Rhythm: Normal rate and regular rhythm. Rhythm irregular.      Heart sounds: Normal heart sounds. No murmur heard.     Comments: Atrial fibrillation irregularly irregular  Pulmonary:      Effort: Pulmonary effort is normal. No respiratory distress.      Breath sounds: Normal breath sounds. No wheezing.   Abdominal:      General: Bowel sounds are normal.      Palpations: Abdomen is soft.      Tenderness: There is no abdominal tenderness.   Musculoskeletal:         General: No deformity. Normal range of motion.      Cervical back: Normal range of motion and neck supple.   Skin:     General: Skin is warm and dry.      Coloration: Skin is not pale.      Findings: No erythema (.this) or rash.   Neurological:      Mental Status: He is alert and oriented to person, place, and time.      Cranial Nerves: No cranial nerve deficit.   Psychiatric:         Behavior: Behavior normal.          Thought Content: Thought content normal.       Procedures       Results for orders placed or performed during the hospital encounter of 09/11/23   Comprehensive Metabolic Panel    Specimen: Blood   Result Value Ref Range    Glucose 97 65 - 99 mg/dL    BUN 13 6 - 20 mg/dL    Creatinine 0.69 (L) 0.76 - 1.27 mg/dL    Sodium 139 136 - 145 mmol/L    Potassium 4.3 3.5 - 5.2 mmol/L    Chloride 104 98 - 107 mmol/L    CO2 26.2 22.0 - 29.0 mmol/L    Calcium 9.2 8.6 - 10.5 mg/dL    Total Protein 7.3 6.0 - 8.5 g/dL    Albumin 3.2 (L) 3.5 - 5.2 g/dL    ALT (SGPT) 36 1 - 41 U/L    AST (SGOT) 48 (H) 1 - 40 U/L    Alkaline Phosphatase 94 39 - 117 U/L    Total Bilirubin 0.3 0.0 - 1.2 mg/dL    Globulin 4.1 gm/dL    A/G Ratio 0.8 g/dL    BUN/Creatinine Ratio 18.8 7.0 - 25.0    Anion Gap 8.8 5.0 - 15.0 mmol/L    eGFR 110.7 >60.0 mL/min/1.73   High Sensitivity Troponin T    Specimen: Blood   Result Value Ref Range    HS Troponin T 12 <15 ng/L   Protime-INR    Specimen: Blood   Result Value Ref Range    Protime 14.0 12.1 - 14.7 Seconds    INR 1.03 0.90 - 1.10   CBC Auto Differential    Specimen: Blood   Result Value Ref Range    WBC 7.25 3.40 - 10.80 10*3/mm3    RBC 4.06 (L) 4.14 - 5.80 10*6/mm3    Hemoglobin 12.9 (L) 13.0 - 17.7 g/dL    Hematocrit 41.4 37.5 - 51.0 %    .0 (H) 79.0 - 97.0 fL    MCH 31.8 26.6 - 33.0 pg    MCHC 31.2 (L) 31.5 - 35.7 g/dL    RDW 16.1 (H) 12.3 - 15.4 %    RDW-SD 61.3 (H) 37.0 - 54.0 fl    MPV 10.1 6.0 - 12.0 fL    Platelets 215 140 - 450 10*3/mm3    Neutrophil % 82.8 (H) 42.7 - 76.0 %    Lymphocyte % 8.7 (L) 19.6 - 45.3 %    Monocyte % 5.8 5.0 - 12.0 %    Eosinophil % 1.9 0.3 - 6.2 %    Basophil % 0.4 0.0 - 1.5 %    Immature Grans % 0.4 0.0 - 0.5 %    Neutrophils, Absolute 6.00 1.70 - 7.00 10*3/mm3    Lymphocytes, Absolute 0.63 (L) 0.70 - 3.10 10*3/mm3    Monocytes, Absolute 0.42 0.10 - 0.90 10*3/mm3    Eosinophils, Absolute 0.14 0.00 - 0.40 10*3/mm3    Basophils, Absolute 0.03 0.00 - 0.20  10*3/mm3    Immature Grans, Absolute 0.03 0.00 - 0.05 10*3/mm3    nRBC 0.0 0.0 - 0.2 /100 WBC   High Sensitivity Troponin T 2Hr    Specimen: Blood   Result Value Ref Range    HS Troponin T 15 (H) <15 ng/L    Troponin T Delta 3 >=-4 - <+4 ng/L   ECG 12 Lead Chest Pain   Result Value Ref Range    QT Interval 254 ms    QTC Interval 361 ms   Green Top (Gel)   Result Value Ref Range    Extra Tube Hold for add-ons.    Lavender Top   Result Value Ref Range    Extra Tube hold for add-on    Gold Top - SST   Result Value Ref Range    Extra Tube Hold for add-ons.    Light Blue Top   Result Value Ref Range    Extra Tube Hold for add-ons.        Results for orders placed or performed during the hospital encounter of 09/11/23   Comprehensive Metabolic Panel    Specimen: Blood   Result Value Ref Range    Glucose 97 65 - 99 mg/dL    BUN 13 6 - 20 mg/dL    Creatinine 0.69 (L) 0.76 - 1.27 mg/dL    Sodium 139 136 - 145 mmol/L    Potassium 4.3 3.5 - 5.2 mmol/L    Chloride 104 98 - 107 mmol/L    CO2 26.2 22.0 - 29.0 mmol/L    Calcium 9.2 8.6 - 10.5 mg/dL    Total Protein 7.3 6.0 - 8.5 g/dL    Albumin 3.2 (L) 3.5 - 5.2 g/dL    ALT (SGPT) 36 1 - 41 U/L    AST (SGOT) 48 (H) 1 - 40 U/L    Alkaline Phosphatase 94 39 - 117 U/L    Total Bilirubin 0.3 0.0 - 1.2 mg/dL    Globulin 4.1 gm/dL    A/G Ratio 0.8 g/dL    BUN/Creatinine Ratio 18.8 7.0 - 25.0    Anion Gap 8.8 5.0 - 15.0 mmol/L    eGFR 110.7 >60.0 mL/min/1.73   High Sensitivity Troponin T    Specimen: Blood   Result Value Ref Range    HS Troponin T 12 <15 ng/L   Protime-INR    Specimen: Blood   Result Value Ref Range    Protime 14.0 12.1 - 14.7 Seconds    INR 1.03 0.90 - 1.10   CBC Auto Differential    Specimen: Blood   Result Value Ref Range    WBC 7.25 3.40 - 10.80 10*3/mm3    RBC 4.06 (L) 4.14 - 5.80 10*6/mm3    Hemoglobin 12.9 (L) 13.0 - 17.7 g/dL    Hematocrit 41.4 37.5 - 51.0 %    .0 (H) 79.0 - 97.0 fL    MCH 31.8 26.6 - 33.0 pg    MCHC 31.2 (L) 31.5 - 35.7 g/dL    RDW 16.1  (H) 12.3 - 15.4 %    RDW-SD 61.3 (H) 37.0 - 54.0 fl    MPV 10.1 6.0 - 12.0 fL    Platelets 215 140 - 450 10*3/mm3    Neutrophil % 82.8 (H) 42.7 - 76.0 %    Lymphocyte % 8.7 (L) 19.6 - 45.3 %    Monocyte % 5.8 5.0 - 12.0 %    Eosinophil % 1.9 0.3 - 6.2 %    Basophil % 0.4 0.0 - 1.5 %    Immature Grans % 0.4 0.0 - 0.5 %    Neutrophils, Absolute 6.00 1.70 - 7.00 10*3/mm3    Lymphocytes, Absolute 0.63 (L) 0.70 - 3.10 10*3/mm3    Monocytes, Absolute 0.42 0.10 - 0.90 10*3/mm3    Eosinophils, Absolute 0.14 0.00 - 0.40 10*3/mm3    Basophils, Absolute 0.03 0.00 - 0.20 10*3/mm3    Immature Grans, Absolute 0.03 0.00 - 0.05 10*3/mm3    nRBC 0.0 0.0 - 0.2 /100 WBC   High Sensitivity Troponin T 2Hr    Specimen: Blood   Result Value Ref Range    HS Troponin T 15 (H) <15 ng/L    Troponin T Delta 3 >=-4 - <+4 ng/L   ECG 12 Lead Chest Pain   Result Value Ref Range    QT Interval 254 ms    QTC Interval 361 ms   Green Top (Gel)   Result Value Ref Range    Extra Tube Hold for add-ons.    Lavender Top   Result Value Ref Range    Extra Tube hold for add-on    Gold Top - SST   Result Value Ref Range    Extra Tube Hold for add-ons.    Light Blue Top   Result Value Ref Range    Extra Tube Hold for add-ons.          ED Course  ED Course as of 09/11/23 1617   Mon Sep 11, 2023   1244 ECG 12 Lead Chest Pain  EKG 1244 atrial fibs with RVR.  Rate 122  QTc 361 no acute ST elevation.  Generalized motion artifact detected [LK]   1425 Patient's had no visible seizure activity for the duration of his ED stay.  Repeat troponin is pending.  Patient is medically stable to be discharged.    Pt's is at bedside patient is going to be incarcerated at [facility] and has appropriate medical staffing [LK]   1531 Patient's work-up reveals no acute life-threatening cardiopulmonary process or abnormalities that would warrant medical intervention at this time.  Patient shaking and trembling was likely emotional driven given patient was  being [facility] to [facility] for [facility] [place]. [LK]      ED Course User Index  [LK] Julisa Birch DO                                           Medical Decision Making  Problems Addressed:  Atrial fibrillation, unspecified type: complicated acute illness or injury  Chronic anticoagulation: complicated acute illness or injury  Obesity hypoventilation syndrome: complicated acute illness or injury  Oxygen dependent: complicated acute illness or injury    Amount and/or Complexity of Data Reviewed  Labs: ordered.  Radiology: ordered.  ECG/medicine tests: ordered. Decision-making details documented in ED Course.    Risk  OTC drugs.  Prescription drug management.        Final diagnoses:   Atrial fibrillation, unspecified type   Obesity hypoventilation syndrome   Oxygen dependent   Chronic anticoagulation       ED Disposition  ED Disposition       ED Disposition   Discharge    Condition   Stable    Comment   --               Fernando Whitaker, APRN  [address] [facility] [address]  [phone]               Medication List      No changes were made to your prescriptions during this visit.            Julisa Birch DO  09/11/23 0289

## 2023-09-11 NOTE — ED NOTES
Spoke with [facility], states they will send an ambulance to transport patient to the [facility].

## 2023-09-13 LAB
QT INTERVAL: 254 MS
QTC INTERVAL: 361 MS

## 2024-07-08 RX ORDER — SACUBITRIL AND VALSARTAN 24; 26 MG/1; MG/1
1 TABLET, FILM COATED ORAL 2 TIMES DAILY
Qty: 60 TABLET | Refills: 3 | Status: SHIPPED | OUTPATIENT
Start: 2024-07-08

## 2024-07-08 NOTE — TELEPHONE ENCOUNTER
Caller: CarlAdriano    Relationship: Self    Best call back number: [phone]    Requested Prescriptions:   Requested Prescriptions     Pending Prescriptions Disp Refills    sacubitril-valsartan (Entresto) 24-26 MG tablet 60 tablet 3     Sig: Take 1 tablet by mouth 2 (Two) Times a Day.        Pharmacy where request should be sent: [facility] [place] [address] [address] - [phone]  - [phone]     Last office visit with prescribing clinician: 1/12/2023   Last telemedicine visit with prescribing clinician: Visit date not found   Next office visit with prescribing clinician: Visit date not found     Additional details provided by patient: OUT OF MEDICATION AND DOESN'T SEEN DR. ALCALA TILL JULY [facility]    Does the patient have less than a 3 day supply:  [x] Yes  [] No    Would you like a call back once the refill request has been completed: [x] Yes [] No    If the office needs to give you a call back, can they leave a voicemail: [x] Yes [] No    Jeffry Andrews Rep   07/08/24 15:35 EDT

## (undated) DEVICE — PK EXTREM LOWR 70

## (undated) DEVICE — BANDAGE,GAUZE,BULKEE II,4.5"X4.1YD,STRL: Brand: MEDLINE

## (undated) DEVICE — PROXIMATE RH ROTATING HEAD SKIN STAPLERS (35 WIDE) CONTAINS 35 STAINLESS STEEL STAPLES: Brand: PROXIMATE

## (undated) DEVICE — SUT SILK 2/0 SH 30IN K833H

## (undated) DEVICE — DRN JP FLT NO TROC SIL 3/4PERF 10MM

## (undated) DEVICE — DRSNG PAD ABD 8X10IN STRL

## (undated) DEVICE — SPNG GZ WOVN 4X4IN 12PLY 10/BX STRL

## (undated) DEVICE — HOLDER: Brand: DEROYAL

## (undated) DEVICE — DRAPE,LAPAROTOMY,PED,STERILE: Brand: MEDLINE

## (undated) DEVICE — SHEET,DRAPE,70X100,STERILE: Brand: MEDLINE

## (undated) DEVICE — 2963 MEDIPORE SOFT CLOTH TAPE 3 IN X 10 YD 12 RLS/CS: Brand: 3M™ MEDIPORE™

## (undated) DEVICE — JACKSON-PRATT 100CC BULB RESERVOIR: Brand: CARDINAL HEALTH

## (undated) DEVICE — DRN PENRS SAFET/CLIP 1/2X12IN LF

## (undated) DEVICE — ENSEAL 20 CM SHAFT, LARGE JAW: Brand: ENSEAL X1

## (undated) DEVICE — SUT NLY 2/0 664G

## (undated) DEVICE — PK BASIC 70

## (undated) DEVICE — ENCORE® LATEX MICRO SIZE 7.5, STERILE LATEX POWDER-FREE SURGICAL GLOVE: Brand: ENCORE

## (undated) DEVICE — TRY SKINPREP PVP SCRB W PAINT

## (undated) DEVICE — DRSNG ADAPTIC 3X16